# Patient Record
Sex: FEMALE | Race: WHITE | NOT HISPANIC OR LATINO | Employment: OTHER | RURAL
[De-identification: names, ages, dates, MRNs, and addresses within clinical notes are randomized per-mention and may not be internally consistent; named-entity substitution may affect disease eponyms.]

---

## 2022-06-02 ENCOUNTER — OFFICE VISIT (OUTPATIENT)
Dept: FAMILY MEDICINE CLINIC | Facility: CLINIC | Age: 77
End: 2022-06-02

## 2022-06-02 VITALS
DIASTOLIC BLOOD PRESSURE: 76 MMHG | HEART RATE: 62 BPM | OXYGEN SATURATION: 97 % | BODY MASS INDEX: 32.28 KG/M2 | SYSTOLIC BLOOD PRESSURE: 120 MMHG | HEIGHT: 61 IN | WEIGHT: 171 LBS

## 2022-06-02 DIAGNOSIS — R35.1 NOCTURIA: ICD-10-CM

## 2022-06-02 DIAGNOSIS — M05.79 RHEUMATOID ARTHRITIS INVOLVING MULTIPLE SITES WITH POSITIVE RHEUMATOID FACTOR: ICD-10-CM

## 2022-06-02 DIAGNOSIS — G60.9 IDIOPATHIC PERIPHERAL NEUROPATHY: ICD-10-CM

## 2022-06-02 DIAGNOSIS — K22.70 BARRETT'S ESOPHAGUS WITHOUT DYSPLASIA: ICD-10-CM

## 2022-06-02 DIAGNOSIS — I10 ESSENTIAL HYPERTENSION, BENIGN: ICD-10-CM

## 2022-06-02 DIAGNOSIS — M54.16 LUMBAR RADICULITIS: ICD-10-CM

## 2022-06-02 DIAGNOSIS — R73.03 PREDIABETES: Primary | ICD-10-CM

## 2022-06-02 DIAGNOSIS — M79.7 FIBROMYALGIA: ICD-10-CM

## 2022-06-02 DIAGNOSIS — K21.9 GASTROESOPHAGEAL REFLUX DISEASE WITHOUT ESOPHAGITIS: ICD-10-CM

## 2022-06-02 DIAGNOSIS — N39.41 URGE URINARY INCONTINENCE: ICD-10-CM

## 2022-06-02 DIAGNOSIS — M48.061 SPINAL STENOSIS OF LUMBAR REGION WITHOUT NEUROGENIC CLAUDICATION: ICD-10-CM

## 2022-06-02 DIAGNOSIS — R53.83 FATIGUE, UNSPECIFIED TYPE: ICD-10-CM

## 2022-06-02 DIAGNOSIS — K58.0 IRRITABLE BOWEL SYNDROME WITH DIARRHEA: ICD-10-CM

## 2022-06-02 DIAGNOSIS — F33.41 RECURRENT MAJOR DEPRESSIVE DISORDER, IN PARTIAL REMISSION: ICD-10-CM

## 2022-06-02 DIAGNOSIS — Z79.899 ENCOUNTER FOR LONG-TERM (CURRENT) USE OF OTHER MEDICATIONS: ICD-10-CM

## 2022-06-02 DIAGNOSIS — E78.2 MIXED HYPERLIPIDEMIA: ICD-10-CM

## 2022-06-02 PROCEDURE — 99214 OFFICE O/P EST MOD 30 MIN: CPT | Performed by: FAMILY MEDICINE

## 2022-06-02 PROCEDURE — 36415 COLL VENOUS BLD VENIPUNCTURE: CPT | Performed by: FAMILY MEDICINE

## 2022-06-02 RX ORDER — AMLODIPINE BESYLATE 5 MG/1
1 TABLET ORAL DAILY
COMMUNITY
Start: 2022-04-17 | End: 2022-12-11 | Stop reason: SDUPTHER

## 2022-06-02 RX ORDER — HYDROXYCHLOROQUINE SULFATE 200 MG/1
1 TABLET, FILM COATED ORAL 2 TIMES DAILY
COMMUNITY
Start: 2022-03-25

## 2022-06-02 RX ORDER — LEFLUNOMIDE 20 MG/1
1 TABLET ORAL DAILY
COMMUNITY
Start: 2022-03-25

## 2022-06-02 RX ORDER — TIZANIDINE 4 MG/1
1 TABLET ORAL
COMMUNITY
Start: 2022-04-17 | End: 2022-12-11 | Stop reason: SDUPTHER

## 2022-06-02 RX ORDER — METOPROLOL SUCCINATE 100 MG/1
1 TABLET, EXTENDED RELEASE ORAL DAILY
COMMUNITY
Start: 2022-05-08 | End: 2022-12-11 | Stop reason: SDUPTHER

## 2022-06-02 RX ORDER — DULOXETIN HYDROCHLORIDE 60 MG/1
1 CAPSULE, DELAYED RELEASE ORAL DAILY
COMMUNITY
Start: 2022-05-08 | End: 2022-06-02 | Stop reason: SDUPTHER

## 2022-06-02 RX ORDER — DULOXETIN HYDROCHLORIDE 60 MG/1
60 CAPSULE, DELAYED RELEASE ORAL DAILY
Qty: 90 CAPSULE | Refills: 3 | Status: SHIPPED | OUTPATIENT
Start: 2022-06-02 | End: 2022-12-11

## 2022-06-02 RX ORDER — TRAMADOL HYDROCHLORIDE 50 MG/1
50 TABLET ORAL EVERY 6 HOURS PRN
COMMUNITY
End: 2022-06-02 | Stop reason: SDUPTHER

## 2022-06-02 RX ORDER — ESTRADIOL 0.07 MG/D
1 PATCH TRANSDERMAL WEEKLY
COMMUNITY
Start: 2022-03-27 | End: 2022-12-11

## 2022-06-02 RX ORDER — DEXLANSOPRAZOLE 60 MG/1
1 CAPSULE, DELAYED RELEASE ORAL DAILY
COMMUNITY
Start: 2022-03-27 | End: 2022-12-11 | Stop reason: SDUPTHER

## 2022-06-02 RX ORDER — IBUPROFEN 800 MG/1
1 TABLET ORAL DAILY PRN
COMMUNITY
Start: 2022-03-23 | End: 2022-12-11

## 2022-06-02 RX ORDER — GABAPENTIN 100 MG/1
2 CAPSULE ORAL DAILY
COMMUNITY
Start: 2022-03-27

## 2022-06-02 RX ORDER — ADALIMUMAB 40MG/0.4ML
KIT SUBCUTANEOUS
COMMUNITY
Start: 2022-05-09

## 2022-06-02 RX ORDER — PRAVASTATIN SODIUM 20 MG
1 TABLET ORAL DAILY
COMMUNITY
Start: 2022-04-17 | End: 2022-12-11 | Stop reason: SDUPTHER

## 2022-06-02 RX ORDER — TRAMADOL HYDROCHLORIDE 50 MG/1
50 TABLET ORAL EVERY 6 HOURS PRN
Qty: 270 TABLET | Refills: 1 | Status: SHIPPED | OUTPATIENT
Start: 2022-06-02 | End: 2022-12-12 | Stop reason: SDUPTHER

## 2022-06-02 RX ORDER — VALSARTAN AND HYDROCHLOROTHIAZIDE 320; 25 MG/1; MG/1
1 TABLET, FILM COATED ORAL DAILY
COMMUNITY
Start: 2022-05-08 | End: 2022-12-11 | Stop reason: SDUPTHER

## 2022-06-02 NOTE — PROGRESS NOTES
"Chief Complaint  Roberto Carlos Pichardo presents to [facility]  History of Present Illness  Patient is here for 6-month checkup on hypertension and other medical issues.  Old records note that she has been diagnosed with type 2 diabetes, but detailed inspection of her records revealed that her glucose was only in the diabetes range when she was on prednisone for rheumatoid arthritis, and once the prednisone was stopped/she was weaned off, her glucose was in the prediabetes range.    The patient did tear her right rotator cuff during a fall a few months ago and has been doing physical therapy and is markedly improved.  She also has been having a lot of back pain, and went to [facility] orthopedics and got an epidural steroid injection, and will be having another 1 or some type of back injection at the end of this month.  She is having left leg radiculitis symptoms intermittently.  No saddle anesthesia or leg weakness.    She had been having issues with her right leg giving out on her, and had several falls.  That has improved with exercise.  Again, she is seen at [facility] orthopedics about her back issues.    Patient does have some intermittent issues with bowel and bladder function, mostly related to urinary urgency with urge incontinence and nocturia x3, occasionally having a small bowel movement when her bladder empties without a sensation of rectal emptying.  She discussed this in the context of her back issues, but since she has not had significant incontinence of bowel and bladder, it sounds more like a urogynecology type issue, and she wishes to be referred to a urologist for consultation.    Objective   Vital Signs:   Vitals:    06/02/22 1031   BP: 120/76   Pulse: 62   SpO2: 97%   Weight: 77.6 kg (171 lb)   Height: 154.9 cm (61\")      /76   Pulse 62   Ht 154.9 cm (61\")   Wt 77.6 kg (171 lb)   SpO2 97%   BMI 32.31 kg/m²     Body mass index is 32.31 " kg/m².    Review of Systems   Constitutional: Positive for fatigue. Negative for chills, fever and unexpected weight loss.   HENT: Negative for ear discharge, ear pain, mouth sores, nosebleeds, rhinorrhea, sinus pressure, sore throat, swollen glands, trouble swallowing and voice change.    Eyes: Negative for blurred vision, double vision, pain, redness and visual disturbance.   Respiratory: Negative for cough, shortness of breath and wheezing.    Cardiovascular: Negative for chest pain, palpitations and leg swelling.        PND, orthopnea   Gastrointestinal: Negative for abdominal distention, abdominal pain, anal bleeding, blood in stool, constipation, diarrhea, nausea, vomiting and GERD.        Dysphagia, odynophagia   Endocrine: Negative for polydipsia, polyphagia and polyuria.   Genitourinary: Positive for urgency and urinary incontinence. Negative for dysuria, flank pain, frequency, hematuria and vaginal discharge.   Musculoskeletal: Positive for back pain. Negative for arthralgias (unusual/atypica), gait problem, joint swelling, myalgias and neck pain.   Skin: Negative for rash, skin lesions (worrisome/suspicious), wound and bruise.   Allergic/Immunologic: Negative for food allergies.   Neurological: Negative for dizziness, tremors, seizures, syncope, facial asymmetry, speech difficulty, weakness, light-headedness, numbness, headache, memory problem and confusion.   Hematological: Negative for adenopathy. Does not bruise/bleed easily.   Psychiatric/Behavioral: Negative for suicidal ideas and depressed mood. The patient is not nervous/anxious.        Past History:  Medical History: has a past medical history of Allergic rhinitis, Carter esophagus (2006), Benign essential hypertension, Breast complaint (2008), Cataract, Class 1 obesity in adult, Diabetes mellitus, type 2 (HCC) (2013), Diabetic peripheral neuropathy associated with type 2 diabetes mellitus (HCC), Diverticulitis (2005), Diverticulosis of large  intestine without hemorrhage (2011), Edema, Fatigue, Fibromyalgia, Gastroesophageal reflux disease without esophagitis, Hammer toe, Herpes simplex, Hiatal hernia with gastroesophageal reflux, High risk medication use, Irritable bowel syndrome, Irritable bowel syndrome with constipation, Left retinal detachment (2010), Microscopic hematuria (1995), Mixed hyperlipidemia, Lizama neuroma, left (2010), Lizama's neuroma of both feet (2007), Lizama's neuroma of left foot (2008), Onychomycosis of left great toe, Plantar fasciitis, 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), Rheumatoid arthritis involving multiple joints (HCC), Tear of meniscus of left knee (2008), and Vitamin D deficiency.   Surgical History: has a past surgical history that includes Lizama's Neuroma Excision (Left, 2008); Cystoscopy; Knee arthroscopy; Tonsillectomy and adenoidectomy; Dilation and curettage of uterus; Bunionectomy (Bilateral); Colonoscopy (2011); and Esophagogastroduodenoscopy (2008).   Family History: family history includes Alzheimer's disease in her mother; Heart failure in her mother; Hyperlipidemia in her mother; Hypertension in her mother.   Social History: reports that she quit smoking about 32 years ago. Her smoking use included cigarettes. She started smoking about 52 years ago. She has a 20.00 pack-year smoking history. She has never used smokeless tobacco. She reports previous alcohol use. She reports that she does not use drugs.      Current Outpatient Medications:   •  amLODIPine (NORVASC) 5 MG tablet, Take 1 tablet by mouth Daily., Disp: , Rfl:   •  dexlansoprazole (DEXILANT) 60 MG capsule, Take 1 capsule by mouth Daily., Disp: , Rfl:   •  DULoxetine (CYMBALTA) 60 MG capsule, Take 1 capsule by mouth Daily., Disp: 90 capsule, Rfl: 3  •  estradiol (CLIMARA) 0.075 MG/24HR patch, Place 1 patch on the skin as directed by provider 1 (One) Time Per Week., Disp: , Rfl:   •  gabapentin (NEURONTIN) 100 MG capsule, Take 2  capsules by mouth Daily., Disp: , Rfl:   •  Humira Pen 40 MG/0.4ML Pen-injector Kit, , Disp: , Rfl:   •  hydroxychloroquine (PLAQUENIL) 200 MG tablet, Take 1 tablet by mouth 2 (Two) Times a Day., Disp: , Rfl:   •  ibuprofen (ADVIL,MOTRIN) 800 MG tablet, Take 1 tablet by mouth Daily As Needed., Disp: , Rfl:   •  leflunomide (ARAVA) 20 MG tablet, Take 1 tablet by mouth Daily., Disp: , Rfl:   •  metoprolol succinate XL (TOPROL-XL) 100 MG 24 hr tablet, Take 1 tablet by mouth Daily., Disp: , Rfl:   •  pravastatin (PRAVACHOL) 20 MG tablet, Take 1 tablet by mouth Daily., Disp: , Rfl:   •  tiZANidine (ZANAFLEX) 4 MG tablet, Take 1 tablet by mouth every night at bedtime., Disp: , Rfl:   •  traMADol (ULTRAM) 50 MG tablet, Take 1 tablet by mouth Every 6 (Six) Hours As Needed for Moderate Pain ., Disp: 270 tablet, Rfl: 1  •  valsartan-hydrochlorothiazide (DIOVAN-HCT) 320-25 MG per tablet, Take 1 tablet by mouth Daily., Disp: , Rfl:     Allergies: Erythromycin    Physical Exam  Constitutional:       General: She is not in acute distress.     Appearance: She is obese. She is not toxic-appearing.   HENT:      Head: Normocephalic and atraumatic.      Right Ear: Tympanic membrane, ear canal and external ear normal.      Left Ear: Tympanic membrane, ear canal and external ear normal.      Nose: Nose normal. No rhinorrhea.      Mouth/Throat:      Mouth: Mucous membranes are moist.      Pharynx: Oropharynx is clear. No posterior oropharyngeal erythema.   Eyes:      General: No scleral icterus.     Extraocular Movements: Extraocular movements intact.      Conjunctiva/sclera: Conjunctivae normal.      Pupils: Pupils are equal, round, and reactive to light.   Neck:      Vascular: No carotid bruit.   Cardiovascular:      Rate and Rhythm: Normal rate and regular rhythm.      Pulses: Normal pulses.      Heart sounds: Normal heart sounds. No murmur heard.    No gallop.   Pulmonary:      Effort: Pulmonary effort is normal.      Breath sounds:  Normal breath sounds. No wheezing, rhonchi or rales.   Chest:      Chest wall: No tenderness.   Abdominal:      General: Bowel sounds are normal. There is no distension.      Palpations: Abdomen is soft. There is no mass.      Tenderness: There is no abdominal tenderness. There is no guarding or rebound.   Musculoskeletal:         General: No swelling, tenderness or deformity. Normal range of motion.      Cervical back: No rigidity.      Right lower leg: No edema.      Left lower leg: No edema.   Lymphadenopathy:      Cervical: No cervical adenopathy.   Skin:     General: Skin is warm and dry.      Capillary Refill: Capillary refill takes less than 2 seconds.      Coloration: Skin is not jaundiced or pale.      Findings: No bruising, erythema or rash.   Neurological:      General: No focal deficit present.      Mental Status: She is alert and oriented to person, place, and time.      Cranial Nerves: No cranial nerve deficit.      Motor: No weakness.      Coordination: Coordination normal.      Gait: Gait normal.   Psychiatric:         Mood and Affect: Mood normal.         Behavior: Behavior normal.         Thought Content: Thought content normal.         Judgment: Judgment normal.          Result Review :                  Assessment and Plan   Diagnoses and all orders for this visit:    1. Prediabetes (Primary)  -     Hemoglobin A1c; Future  -     Hemoglobin A1c  We will check labs today and refill her current medications.  The patient still uses tramadol as needed and has been very reliable with this.  Moe report is fine and she is not due for urine drug screen today.  She also still takes gabapentin for neuropathy.  She is followed by rheumatology for her rheumatoid arthritis and fibromyalgia as well.  She will keep her follow-up appointment with orthopedics about her back issues, and we will refer to urology for her urinary urge incontinence and nocturia.  She says her depression is currently doing well and  she needs a refill on her Cymbalta.  If all goes well I will see her back in 6 months or sooner if needed  2. Essential hypertension, benign    3. Mixed hyperlipidemia  -     Lipid Panel; Future  -     Lipid Panel    4. Encounter for long-term (current) use of other medications  -     CBC Auto Differential; Future  -     Comprehensive Metabolic Panel; Future  -     CBC Auto Differential  -     Comprehensive Metabolic Panel    5. Recurrent major depressive disorder, in partial remission (HCC)    6. Fibromyalgia    7. Rheumatoid arthritis involving multiple sites with positive rheumatoid factor (HCC)  -     traMADol (ULTRAM) 50 MG tablet; Take 1 tablet by mouth Every 6 (Six) Hours As Needed for Moderate Pain .  Dispense: 270 tablet; Refill: 1    8. Gastroesophageal reflux disease without esophagitis    9. Irritable bowel syndrome with diarrhea    10. Carter's esophagus without dysplasia    11. Idiopathic peripheral neuropathy  -     Vitamin B12; Future  -     Folate; Future  -     Vitamin B12  -     Folate    12. Fatigue, unspecified type  -     TSH; Future  -     TSH    13. Urge urinary incontinence  -     Ambulatory Referral to Urology    14. Nocturia    15. Lumbar radiculitis    16. Spinal stenosis of lumbar region without neurogenic claudication    Other orders  -     DULoxetine (CYMBALTA) 60 MG capsule; Take 1 capsule by mouth Daily.  Dispense: 90 capsule; Refill: 3                 Follow Up   Return in about 6 months (around 12/2/2022) for Recheck, Nurse - AWV Subsequent.  Patient was given instructions and counseling regarding her condition or for health maintenance advice. Please see specific information pulled into the AVS if appropriate.     Donny Falcon MD

## 2022-06-03 LAB
ALBUMIN SERPL-MCNC: 4.3 G/DL (ref 3.7–4.7)
ALBUMIN/GLOB SERPL: 2.2 {RATIO} (ref 1.2–2.2)
ALP SERPL-CCNC: 80 IU/L (ref 44–121)
ALT SERPL-CCNC: 26 IU/L (ref 0–32)
AST SERPL-CCNC: 31 IU/L (ref 0–40)
BASOPHILS # BLD AUTO: 0 X10E3/UL (ref 0–0.2)
BASOPHILS NFR BLD AUTO: 1 %
BILIRUB SERPL-MCNC: 0.5 MG/DL (ref 0–1.2)
BUN SERPL-MCNC: 24 MG/DL (ref 8–27)
BUN/CREAT SERPL: 33 (ref 12–28)
CALCIUM SERPL-MCNC: 10.3 MG/DL (ref 8.7–10.3)
CHLORIDE SERPL-SCNC: 102 MMOL/L (ref 96–106)
CHOLEST SERPL-MCNC: 188 MG/DL (ref 100–199)
CO2 SERPL-SCNC: 25 MMOL/L (ref 20–29)
CREAT SERPL-MCNC: 0.72 MG/DL (ref 0.57–1)
EGFRCR SERPLBLD CKD-EPI 2021: 88 ML/MIN/1.73
EOSINOPHIL # BLD AUTO: 0.1 X10E3/UL (ref 0–0.4)
EOSINOPHIL NFR BLD AUTO: 2 %
ERYTHROCYTE [DISTWIDTH] IN BLOOD BY AUTOMATED COUNT: 12.8 % (ref 11.7–15.4)
FOLATE SERPL-MCNC: >20 NG/ML
GLOBULIN SER CALC-MCNC: 2 G/DL (ref 1.5–4.5)
GLUCOSE SERPL-MCNC: 94 MG/DL (ref 65–99)
HBA1C MFR BLD: 5.4 % (ref 4.8–5.6)
HCT VFR BLD AUTO: 41.1 % (ref 34–46.6)
HDLC SERPL-MCNC: 49 MG/DL
HGB BLD-MCNC: 14 G/DL (ref 11.1–15.9)
IMM GRANULOCYTES # BLD AUTO: 0 X10E3/UL (ref 0–0.1)
IMM GRANULOCYTES NFR BLD AUTO: 0 %
LDLC SERPL CALC-MCNC: 101 MG/DL (ref 0–99)
LYMPHOCYTES # BLD AUTO: 2.1 X10E3/UL (ref 0.7–3.1)
LYMPHOCYTES NFR BLD AUTO: 33 %
MCH RBC QN AUTO: 31 PG (ref 26.6–33)
MCHC RBC AUTO-ENTMCNC: 34.1 G/DL (ref 31.5–35.7)
MCV RBC AUTO: 91 FL (ref 79–97)
MONOCYTES # BLD AUTO: 1.1 X10E3/UL (ref 0.1–0.9)
MONOCYTES NFR BLD AUTO: 16 %
NEUTROPHILS # BLD AUTO: 3.2 X10E3/UL (ref 1.4–7)
NEUTROPHILS NFR BLD AUTO: 48 %
PLATELET # BLD AUTO: 254 X10E3/UL (ref 150–450)
POTASSIUM SERPL-SCNC: 4.5 MMOL/L (ref 3.5–5.2)
PROT SERPL-MCNC: 6.3 G/DL (ref 6–8.5)
RBC # BLD AUTO: 4.51 X10E6/UL (ref 3.77–5.28)
SODIUM SERPL-SCNC: 142 MMOL/L (ref 134–144)
TRIGL SERPL-MCNC: 221 MG/DL (ref 0–149)
TSH SERPL DL<=0.005 MIU/L-ACNC: 2.07 UIU/ML (ref 0.45–4.5)
VIT B12 SERPL-MCNC: 976 PG/ML (ref 232–1245)
VLDLC SERPL CALC-MCNC: 38 MG/DL (ref 5–40)
WBC # BLD AUTO: 6.6 X10E3/UL (ref 3.4–10.8)

## 2022-07-18 ENCOUNTER — OFFICE VISIT (OUTPATIENT)
Dept: UROLOGY | Facility: CLINIC | Age: 77
End: 2022-07-18

## 2022-07-18 VITALS — BODY MASS INDEX: 32.28 KG/M2 | HEIGHT: 61 IN | WEIGHT: 171 LBS

## 2022-07-18 DIAGNOSIS — R39.9 LOWER URINARY TRACT SYMPTOMS (LUTS): Primary | ICD-10-CM

## 2022-07-18 DIAGNOSIS — N39.41 URGE URINARY INCONTINENCE: ICD-10-CM

## 2022-07-18 PROCEDURE — 51798 US URINE CAPACITY MEASURE: CPT | Performed by: UROLOGY

## 2022-07-18 PROCEDURE — 99204 OFFICE O/P NEW MOD 45 MIN: CPT | Performed by: UROLOGY

## 2022-07-18 RX ORDER — DULOXETIN HYDROCHLORIDE 60 MG/1
1 CAPSULE, DELAYED RELEASE ORAL DAILY
COMMUNITY
Start: 2022-02-27 | End: 2022-12-11 | Stop reason: SDUPTHER

## 2022-07-18 RX ORDER — CONJUGATED ESTROGENS 0.62 MG/G
CREAM VAGINAL
COMMUNITY
Start: 2022-06-04 | End: 2022-12-11

## 2022-07-18 NOTE — PROGRESS NOTES
"       Office Visit New Urology      Patient Name: Mady Pichardo  : 1945   MRN: [id number]     Chief Complaint:  No chief complaint on file.      Referring Provider: Donny Falcon MD    History of Present Illness: Mady Pichardo is a 76 y.o. female who presents to Urology today for ***      Subjective      Review of System: Review of Systems   Constitutional: Negative for chills, fatigue, fever and unexpected weight change.   HENT: Negative for sore throat.    Eyes: Negative for visual disturbance.   Respiratory: Negative for cough, chest tightness and shortness of breath.    Cardiovascular: Negative for chest pain and leg swelling.   Gastrointestinal: Negative for blood in stool, constipation, diarrhea, nausea, rectal pain and vomiting.   Genitourinary: Positive for frequency and urgency. Negative for decreased urine volume, difficulty urinating, dysuria, enuresis, flank pain, genital sores and hematuria.   Musculoskeletal: Negative for back pain and joint swelling.   Skin: Negative for rash and wound.   Neurological: Negative for seizures, speech difficulty, weakness and headaches.   Psychiatric/Behavioral: Negative for confusion, sleep disturbance and suicidal ideas. The patient is not nervous/anxious.       I have reviewed the ROS documented by my clinical staff, updated appropriately and I agree. Karla Leiva MA    Past Medical History:   Past Medical History:   Diagnosis Date   • Allergic rhinitis    • Carter esophagus 2006    EGD WITH BIOPSY      ,, , 2011   • Benign essential hypertension    • Breast complaint     \"DIMPLE\" DX MMG AND ULTRASOUND, ALL NORMAL   • Cataract     NOT IN NEED OF SURGERY YET   • Class 1 obesity in adult    • Diabetes mellitus, type 2 (HCC)    • Diabetic peripheral neuropathy associated with type 2 diabetes mellitus (HCC)    • Diverticulitis     IV ATBX - IMPROVED   • Diverticulosis of large intestine without hemorrhage    • Edema     WITH " VENOUS INSUFFICIENCY   • Fatigue    • Fibromyalgia    • Gastroesophageal reflux disease without esophagitis    • Hammer toe    • Herpes simplex     GENITAL  - CONTROLLED W/ CHRONIC FAMVIR   • Hiatal hernia with gastroesophageal reflux    • High risk medication use    • Irritable bowel syndrome    • Irritable bowel syndrome with constipation    • Left retinal detachment     LASER TREATMENT, SUCCESSFUL   • Microscopic hematuria     IVP/CYSTOSCOPY   • Mixed hyperlipidemia    • Lizama neuroma, left     2ND   • Liazma's neuroma of both feet     INJECTION, FAILED, SURGERY PLANNED   • Lizama's neuroma of left foot     NEUROMA SURGICALLY REMOVED, LEFT FOOT   • Onychomycosis of left great toe    • Plantar fasciitis    • 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)    • Rheumatoid arthritis involving multiple joints (HCC)    • Tear of meniscus of left knee    • Vitamin D deficiency        Past Surgical History:   Past Surgical History:   Procedure Laterality Date   • BUNIONECTOMY Bilateral    • COLONOSCOPY     • CYSTOSCOPY     • DILATATION AND CURETTAGE     • ENDOSCOPY      WITH BIOPSY ( ,, , 2011)   • KNEE ARTHROSCOPY     • LIZAMA'S NEUROMA EXCISION Left     ALSO 2010   • TONSILLECTOMY AND ADENOIDECTOMY         Family History:   Family History   Problem Relation Age of Onset   • Hyperlipidemia Mother    • Hypertension Mother    • Heart failure Mother    • Alzheimer's disease Mother        Social History:   Social History     Socioeconomic History   • Marital status:    Tobacco Use   • Smoking status: Former Smoker     Packs/day: 1.00     Years: 20.00     Pack years: 20.00     Types: Cigarettes     Start date:      Quit date:      Years since quittin.5   • Smokeless tobacco: Never Used   Substance and Sexual Activity   • Alcohol use: Not Currently   • Drug use: Never   • Sexual activity: Defer       Medications:     Current Outpatient  Medications:   •  amLODIPine (NORVASC) 5 MG tablet, Take 1 tablet by mouth Daily., Disp: , Rfl:   •  dexlansoprazole (DEXILANT) 60 MG capsule, Take 1 capsule by mouth Daily., Disp: , Rfl:   •  DULoxetine (CYMBALTA) 60 MG capsule, Take 1 capsule by mouth Daily., Disp: 90 capsule, Rfl: 3  •  estradiol (CLIMARA) 0.075 MG/24HR patch, Place 1 patch on the skin as directed by provider 1 (One) Time Per Week., Disp: , Rfl:   •  gabapentin (NEURONTIN) 100 MG capsule, Take 2 capsules by mouth Daily., Disp: , Rfl:   •  Humira Pen 40 MG/0.4ML Pen-injector Kit, , Disp: , Rfl:   •  hydroxychloroquine (PLAQUENIL) 200 MG tablet, Take 1 tablet by mouth 2 (Two) Times a Day., Disp: , Rfl:   •  ibuprofen (ADVIL,MOTRIN) 800 MG tablet, Take 1 tablet by mouth Daily As Needed., Disp: , Rfl:   •  leflunomide (ARAVA) 20 MG tablet, Take 1 tablet by mouth Daily., Disp: , Rfl:   •  metoprolol succinate XL (TOPROL-XL) 100 MG 24 hr tablet, Take 1 tablet by mouth Daily., Disp: , Rfl:   •  pravastatin (PRAVACHOL) 20 MG tablet, Take 1 tablet by mouth Daily., Disp: , Rfl:   •  tiZANidine (ZANAFLEX) 4 MG tablet, Take 1 tablet by mouth every night at bedtime., Disp: , Rfl:   •  traMADol (ULTRAM) 50 MG tablet, Take 1 tablet by mouth Every 6 (Six) Hours As Needed for Moderate Pain ., Disp: 270 tablet, Rfl: 1  •  valsartan-hydrochlorothiazide (DIOVAN-HCT) 320-25 MG per tablet, Take 1 tablet by mouth Daily., Disp: , Rfl:     Allergies:   Allergies   Allergen Reactions   • Erythromycin GI Intolerance       IPSS Questionnaire (AUA-7):  Over the past month…    1)  Incomplete Emptying  How often have you had a sensation of not emptying your bladder?  {Ratin}   2)  Frequency  How often have you had to urinate less than every two hours? {Ratin}   3)  Intermittency  How often have you found you stopped and started again several times when you urinated?  {Ratin}   4) Urgency  How often have you found it difficult to postpone urination?   "{Ratin}   5) Weak Stream  How often have you had a weak urinary stream?  {Ratin}   6) Straining  How often have you had to push or strain to begin urination?  {Ratin}   7) Nocturia  How many times did you typically get up at night to urinate?  {Ratin}   Total Score:  {0-35:36698}       Quality of life due to urinary symptoms:  If you were to spend the rest of your life with your urinary condition the way it is now, how would you feel about that? {Ratin}   Urine Leakage (Incontinence) {Ratin}     Sexual Health Inventory for Men (TABATHA)   Over the past 6 months:     1. How do you rate your confidence that you could get and keep an erection?  {TABATHA Score 1:97697}   2. When you had erections with sexual                                     stimulation, how often were your erections hard enough for penetration (entering your partner)?  {TABATHA Scores 2:62378}   3. During sexual intercourse, how often were you able to maintain your erection after you had penetrated (entered) your partner?  {TABATHA Scores 3:43026}   4. During sexual intercourse, how difficult was it to maintain your erection to completion of intercourse?  {TABATHA Scores 4:87148}   5. When you attempted sexual intercourse, how often was it satisfactory for you?  {TABATHA Scores 5:79086}    Total Score: {TABATHA Total:69127}   The Sexual Health Inventory for Men further classifies ED severity with the following breakpoints:   1-7 (Severe ED) 8-11 (Moderate ED) 12-16 (Mild to Moderate ED) 17-21 (Mild ED)      Post void residual bladder scan:   126mL       Objective     Physical Exam:   Vital Signs: There were no vitals filed for this visit.  There is no height or weight on file to calculate BMI.     Physical Exam    Genitourinary  Penis: {Desc; circumcised/uncircumcised:5705::\"circumcised\"} penis, glans normal, no penile discharge.  No rashes/lesions.    Testes: descended bilaterally, no masses, {Desc; tender/non:83843} to " "palpation. Remainder of scrotal contents normal. No hernia appreciated.  Rectal:  Normal tone, no masses.  Prostate:  {NUMBERS; 5-50 BY 5:37963} grams.  Symmetric, non-tender, anodular and no induration.      Labs:   Brief Urine Lab Results     None               Lab Results   Component Value Date    GLUCOSE 94 06/02/2022    CALCIUM 10.3 06/02/2022     06/02/2022    K 4.5 06/02/2022    CO2 25 06/02/2022     06/02/2022    BUN 24 06/02/2022    CREATININE 0.72 06/02/2022    BCR 33 (H) 06/02/2022       Lab Results   Component Value Date    WBC 6.6 06/02/2022    HGB 14.0 06/02/2022    HCT 41.1 06/02/2022    MCV 91 06/02/2022     06/02/2022       Images:   No Images in the past 120 days found..    Measures:   Tobacco:   Mady Pichardo  reports that she quit smoking about 32 years ago. Her smoking use included cigarettes. She started smoking about 52 years ago. She has a 20.00 pack-year smoking history. She has never used smokeless tobacco.. I have educated her on the risk of diseases from using tobacco products such as {Tobacco Cessation Diseases:36793::\"cancer\",\"COPD\",\"heart disease\"}.     I advised her to quit and she is {Willing/Not Willing to Quit Tobacco Products:72735}.    I spent {Time Spent Tobacco :01516} minutes counseling the patient.           Urine Incontinence: ( NOUI)  ***   Assessment / Plan      Assessment/Plan:   76 y.o. female is here today for ***    There are no diagnoses linked to this encounter.       Follow Up:   No follow-ups on file.    I spent approximately *** minutes providing clinical care for this patient; including review of patient's chart and provider documentation, face to face time spent with patient in examination room (obtaining history, performing physical exam, discussing diagnosis and management options), placing orders, and completing patient documentation.     Jacob Sterling MD  [facility] [place]   "

## 2022-07-20 PROBLEM — R39.9 LOWER URINARY TRACT SYMPTOMS (LUTS): Status: ACTIVE | Noted: 2022-07-20

## 2022-07-20 NOTE — PROGRESS NOTES
LUTS Female Office Visit      Patient Name: Mady Pichardo  : 1945   MRN: [id number]     Chief Complaint:  Lower Urinary Tract Symptoms.     Referring Provider: Donny Falcon MD    History of Present Illness: Mrs. Pichardo is a 76 y.o. female with history of lower urinary tract symptoms.  Past medical history includes hypertension, hyperlipidemia, diabetes, GERD, irritable bowel syndrome, depression, fibromyalgia, rheumatoid arthritis, diverticulosis.. She presents today for evaluation of lower urinary tract symptoms.  She reports mixed urinary incontinence with urge predominant.  She denies obstructive based urinary symptoms including intermittency, hesitancy or weakening of stream.  She denies hematuria.  She denies history of urinary tract infection.  She reports a daily urinary frequency, urgency, urge associated episodes.  She reports nocturia and nighttime incontinence.  She reports significant bother associated with symptoms, she is requiring daily pads for incontinence episodes.  She denies past urologic evaluation including instrumentation or procedure..       Subjective      Review of System: Review of Systems   Constitutional: Negative for chills, fatigue, fever and unexpected weight change.   HENT: Negative for sore throat.    Eyes: Negative for visual disturbance.   Respiratory: Negative for cough, chest tightness and shortness of breath.    Cardiovascular: Negative for chest pain and leg swelling.   Gastrointestinal: Negative for blood in stool, constipation, diarrhea, nausea, rectal pain and vomiting.   Genitourinary: Negative for decreased urine volume, difficulty urinating, dysuria, enuresis, flank pain, frequency, genital sores, hematuria and urgency.   Musculoskeletal: Negative for back pain and joint swelling.   Skin: Negative for rash and wound.   Neurological: Negative for seizures, speech difficulty, weakness and headaches.   Psychiatric/Behavioral: Negative for confusion, sleep  "disturbance and suicidal ideas. The patient is not nervous/anxious.       I have reviewed the ROS documented by my clinical staff, updated appropriate and I agree. Jacob Sterling MD    Past Medical History:  Past Medical History:   Diagnosis Date   • Allergic rhinitis    • Carter esophagus 2006    EGD WITH BIOPSY      2008,2006, 4-2009, 11/2011   • Benign essential hypertension    • Breast complaint 2008    \"DIMPLE\" DX MMG AND ULTRASOUND, ALL NORMAL   • Cataract     NOT IN NEED OF SURGERY YET   • Class 1 obesity in adult    • Diabetes mellitus, type 2 ([facility]) 2013   • Diabetic peripheral neuropathy associated with type 2 diabetes mellitus ([facility])    • Diverticulitis 2005    IV ATBX - IMPROVED   • Diverticulosis of large intestine without hemorrhage 2011   • Edema     WITH VENOUS INSUFFICIENCY   • Fatigue    • Fibromyalgia    • Gastroesophageal reflux disease without esophagitis    • Hammer toe    • Herpes simplex     GENITAL  - CONTROLLED W/ CHRONIC FAMVIR   • Hiatal hernia with gastroesophageal reflux    • High risk medication use    • Irritable bowel syndrome    • Irritable bowel syndrome with constipation    • Left retinal detachment 2010    LASER TREATMENT, SUCCESSFUL   • Microscopic hematuria 1995    IVP/CYSTOSCOPY   • Mixed hyperlipidemia    • Lizama neuroma, 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   • Lizama's neuroma of both feet 2007    INJECTION, FAILED, SURGERY PLANNED   • Lizama's neuroma of left foot 2008    NEUROMA SURGICALLY REMOVED, LEFT FOOT   • Onychomycosis of left great toe    • Plantar fasciitis    • Recurrent major depression in partial remission ([facility])    • Rheumatoid arthritis involving multiple joints ([facility])    • Tear of meniscus of left knee 2008   • Vitamin D deficiency        Past Surgical History:  Past Surgical History:   Procedure Laterality Date   • BUNIONECTOMY Bilateral    • COLONOSCOPY  2011   • CYSTOSCOPY     • DILATATION AND CURETTAGE     • ENDOSCOPY  2008    WITH BIOPSY ( 2008,2006, 4-2009, 11/2011)   • " KNEE ARTHROSCOPY     • CAZARES'S NEUROMA EXCISION Left 2008    ALSO 2010   • TONSILLECTOMY AND ADENOIDECTOMY         Medications:    Current Outpatient Medications:   •  amLODIPine (NORVASC) 5 MG tablet, Take 1 tablet by mouth Daily., Disp: , Rfl:   •  dexlansoprazole (DEXILANT) 60 MG capsule, Take 1 capsule by mouth Daily., Disp: , Rfl:   •  DULoxetine (CYMBALTA) 60 MG capsule, Take 1 capsule by mouth Daily., Disp: 90 capsule, Rfl: 3  •  DULoxetine (CYMBALTA) 60 MG capsule, Take 1 capsule by mouth Daily., Disp: , Rfl:   •  estradiol (CLIMARA) 0.075 MG/24HR patch, Place 1 patch on the skin as directed by provider 1 (One) Time Per Week., Disp: , Rfl:   •  gabapentin (NEURONTIN) 100 MG capsule, Take 2 capsules by mouth Daily., Disp: , Rfl:   •  Humira Pen 40 MG/0.4ML Pen-injector Kit, , Disp: , Rfl:   •  hydroxychloroquine (PLAQUENIL) 200 MG tablet, Take 1 tablet by mouth 2 (Two) Times a Day., Disp: , Rfl:   •  ibuprofen (ADVIL,MOTRIN) 800 MG tablet, Take 1 tablet by mouth Daily As Needed., Disp: , Rfl:   •  leflunomide (ARAVA) 20 MG tablet, Take 1 tablet by mouth Daily., Disp: , Rfl:   •  metoprolol succinate XL (TOPROL-XL) 100 MG 24 hr tablet, Take 1 tablet by mouth Daily., Disp: , Rfl:   •  pravastatin (PRAVACHOL) 20 MG tablet, Take 1 tablet by mouth Daily., Disp: , Rfl:   •  Premarin 0.625 MG/GM vaginal cream, , Disp: , Rfl:   •  tiZANidine (ZANAFLEX) 4 MG tablet, Take 1 tablet by mouth every night at bedtime., Disp: , Rfl:   •  traMADol (ULTRAM) 50 MG tablet, Take 1 tablet by mouth Every 6 (Six) Hours As Needed for Moderate Pain ., Disp: 270 tablet, Rfl: 1  •  valsartan-hydrochlorothiazide (DIOVAN-HCT) 320-25 MG per tablet, Take 1 tablet by mouth Daily., Disp: , Rfl:     Allergies:  Allergies   Allergen Reactions   • Erythromycin GI Intolerance   • Meloxicam GI Bleeding   • Metronidazole GI Intolerance       Social History:  Social History     Socioeconomic History   • Marital status:    Tobacco Use   •  "Smoking status: Former Smoker     Packs/day: 1.00     Years: 20.00     Pack years: 20.00     Types: Cigarettes     Start date:      Quit date:      Years since quittin.5   • Smokeless tobacco: Never Used   Substance and Sexual Activity   • Alcohol use: Not Currently   • Drug use: Never   • Sexual activity: Defer       Family History:  Family History   Problem Relation Age of Onset   • Hyperlipidemia Mother    • Hypertension Mother    • Heart failure Mother    • Alzheimer's disease Mother          Objective     Physical Exam:   Vital Signs:   Vitals:    22 1345   Weight: 77.6 kg (171 lb)   Height: 154.9 cm (60.98\")   PainSc: 0-No pain     Body mass index is 32.33 kg/m².     Physical Exam  Vitals and nursing note reviewed.   Constitutional:       Appearance: Normal appearance.   HENT:      Head: Normocephalic and atraumatic.   Cardiovascular:      Comments: Well perfused  Pulmonary:      Effort: Pulmonary effort is normal.   Abdominal:      General: Abdomen is flat.      Palpations: Abdomen is soft.   Musculoskeletal:         General: Normal range of motion.   Skin:     General: Skin is warm and dry.   Neurological:      General: No focal deficit present.      Mental Status: She is alert and oriented to person, place, and time. Mental status is at baseline.   Psychiatric:         Mood and Affect: Mood normal.         Behavior: Behavior normal.         Thought Content: Thought content normal.         Judgment: Judgment normal.         Labs:   Brief Urine Lab Results     None               Lab Results   Component Value Date    GLUCOSE 94 2022    CALCIUM 10.3 2022     2022    K 4.5 2022    CO2 25 2022     2022    BUN 24 2022    CREATININE 0.72 2022    BCR 33 (H) 2022       Lab Results   Component Value Date    WBC 6.6 2022    HGB 14.0 2022    HCT 41.1 2022    MCV 91 2022     2022       Images:   No " Images in the past 120 days found..    Measures:   Tobacco:   Mady Pichardo  reports that she quit smoking about 32 years ago. Her smoking use included cigarettes. She started smoking about 52 years ago. She has a 20.00 pack-year smoking history. She has never used smokeless tobacco.. I have educated her on the risk of diseases from using tobacco products.       Urine Incontinence: ( NOUI)  Patient seen and evaluated for mixed urinary incontinence, urge predominant urinary incontinence    Assessment / Plan      Assessment:  Mrs. Pichardo is a 76 y.o. female who presents today with lower urinary tract symptoms including urinary frequency, urgency and urge associated incontinence.  She reports significant bother associated with urinary urgency.  She is wearing daily pad.  She denies obstructive based symptoms.  She denies hematuria or history of urinary tract infection.  She denies past urologic evaluation including instrumentation or procedure.    Diagnoses and all orders for this visit:    1. Lower urinary tract symptoms (LUTS) (Primary)    2. Urge urinary incontinence        Patient Education:     Today we discussed in depth the etiology and management of pelvic floor dysfunction.  We discussed the pelvic floor dysfunction is a common condition in which the patient is unable to relax or coordinate the muscles of the pelvic floor with urination, bowel movement or intercourse.  We discussed that this is a common condition seen both men and women.  We discussed the role of the pelvic musculature and that it supports the bladder, vagina, rectum.  We discussed the intricate and dynamic interplay between the pelvic floor and these associated organs.  Discussed that the pelvic floor musculature added support and stability for these organ systems but can also result in symptoms including muscle spasms, pain with urination, bowel movement or sexual intercourse.  We discussed that the causes of pelvic floor dysfunction are  often multifactorial. .  We discussed that it can often take a prolonged period of multimodal therapy to improve symptoms.  We discussed conservative treatment strategies including biofeedback, pelvic floor physical therapy, medications, relaxing techniques.  We discussed that improving bowel habits and constipation is important.        Today we discussed the etiology and management of LUTS/OAB. We discussed work-up including history and physical exam as well as urinalysis.  We discussed PVR.  We discussed evaluation and management of urinary urgency and overactivity of the bladder.  We discussed conservative management and behavioral strategies including decreasing irritative p.o. fluid intake, timed voiding, double voiding.  We discussed the role that constipation can play in lower urinary tract symptoms and improving bowel hygiene.  We discussed management of overactive bladder including conservative p.o. medication options.  We discussed anticholinergic medication class and the risks and benefits of this medication including side effects including dry mouth, dry, constipation and others.  We discussed that there are additional medications including beta-3 agonist, and associated risks and benefits including hypertension and requirement to monitor blood pressure after starting.  We also discussed  third line potential procedural interventions including intradetrusor botox injections and sacral nerve neuromodulation. We discussed risks and benefits of these procedures.  I discussed the role of a bladder diary and how this will help identify her most concerning urinary symptoms.      Follow-up 4 weeks with bladder diary, continue to evaluation.       Follow Up:   No follow-ups on file.    I spent approximately 45 minutes providing clinical care for this patient; including review of patient's chart and provider documentation, face to face time spent with patient in examination room (obtaining history, performing  physical exam, discussing diagnosis and management options), placing orders, and completing patient documentation.     Jacob Sterling MD  [facility] [place]

## 2022-08-11 ENCOUNTER — PROCEDURE VISIT (OUTPATIENT)
Dept: UROLOGY | Facility: CLINIC | Age: 77
End: 2022-08-11

## 2022-08-11 VITALS — HEIGHT: 61 IN | WEIGHT: 171 LBS | BODY MASS INDEX: 32.28 KG/M2

## 2022-08-11 DIAGNOSIS — N32.81 OAB (OVERACTIVE BLADDER): ICD-10-CM

## 2022-08-11 DIAGNOSIS — N39.41 URGE URINARY INCONTINENCE: ICD-10-CM

## 2022-08-11 DIAGNOSIS — R39.9 LOWER URINARY TRACT SYMPTOMS (LUTS): Primary | ICD-10-CM

## 2022-08-11 PROCEDURE — 52000 CYSTOURETHROSCOPY: CPT | Performed by: UROLOGY

## 2022-08-11 PROCEDURE — 99214 OFFICE O/P EST MOD 30 MIN: CPT | Performed by: UROLOGY

## 2022-08-11 NOTE — PROGRESS NOTES
"     LUTS Female Office Visit      Patient Name: Mady Pichardo  : 1945   MRN: [id number]     Chief Complaint:  Lower Urinary Tract Symptoms.     Referring Provider: No ref. provider found    History of Present Illness: Mrs. Pichardo is a 77 y.o. female with history of lower urinary tract symptoms.  Past medical history includes ***. She presents today for evaluation of ***.       Subjective      Review of System: Review of Systems   Constitutional: Negative for chills, fatigue, fever and unexpected weight change.   HENT: Negative for sore throat.    Eyes: Negative for visual disturbance.   Respiratory: Negative for cough, chest tightness and shortness of breath.    Cardiovascular: Negative for chest pain and leg swelling.   Gastrointestinal: Negative for blood in stool, constipation, diarrhea, nausea, rectal pain and vomiting.   Genitourinary: Positive for frequency and urgency. Negative for decreased urine volume, difficulty urinating, dysuria, enuresis, flank pain, genital sores and hematuria.   Musculoskeletal: Negative for back pain and joint swelling.   Skin: Negative for rash and wound.   Neurological: Negative for seizures, speech difficulty, weakness and headaches.   Psychiatric/Behavioral: Negative for confusion, sleep disturbance and suicidal ideas. The patient is not nervous/anxious.       I have reviewed the ROS documented by my clinical staff, updated appropriate and I agree. Karla Leiva MA    Past Medical History:  Past Medical History:   Diagnosis Date   • Allergic rhinitis    • Carter esophagus 2006    EGD WITH BIOPSY      ,, -, 2011   • Benign essential hypertension    • Breast complaint     \"DIMPLE\" DX MMG AND ULTRASOUND, ALL NORMAL   • Cataract     NOT IN NEED OF SURGERY YET   • Class 1 obesity in adult    • Diabetes mellitus, type 2 (HCC)    • Diabetic peripheral neuropathy associated with type 2 diabetes mellitus (HCC)    • Diverticulitis     IV ATBX - " IMPROVED   • Diverticulosis of large intestine without hemorrhage 2011   • Edema     WITH VENOUS INSUFFICIENCY   • Fatigue    • Fibromyalgia    • Gastroesophageal reflux disease without esophagitis    • Hammer toe    • Herpes simplex     GENITAL  - CONTROLLED W/ CHRONIC FAMVIR   • Hiatal hernia with gastroesophageal reflux    • High risk medication use    • Irritable bowel syndrome    • Irritable bowel syndrome with constipation    • Left retinal detachment 2010    LASER TREATMENT, SUCCESSFUL   • Microscopic hematuria 1995    IVP/CYSTOSCOPY   • Mixed hyperlipidemia    • Lizama neuroma, left 2010    2ND   • Lizama's neuroma of both feet 2007    INJECTION, FAILED, SURGERY PLANNED   • Lizama's neuroma of left foot 2008    NEUROMA SURGICALLY REMOVED, LEFT FOOT   • Onychomycosis of left great toe    • Plantar fasciitis    • 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)    • Rheumatoid arthritis involving multiple joints (HCC)    • Tear of meniscus of left knee 2008   • Vitamin D deficiency        Past Surgical History:  Past Surgical History:   Procedure Laterality Date   • BUNIONECTOMY Bilateral    • COLONOSCOPY  2011   • CYSTOSCOPY     • DILATATION AND CURETTAGE     • ENDOSCOPY  2008    WITH BIOPSY ( 2008,2006, 4-2009, 11/2011)   • KNEE ARTHROSCOPY     • LIZAMA'S NEUROMA EXCISION Left 2008    ALSO 2010   • TONSILLECTOMY AND ADENOIDECTOMY         Medications:    Current Outpatient Medications:   •  amLODIPine (NORVASC) 5 MG tablet, Take 1 tablet by mouth Daily., Disp: , Rfl:   •  dexlansoprazole (DEXILANT) 60 MG capsule, Take 1 capsule by mouth Daily., Disp: , Rfl:   •  DULoxetine (CYMBALTA) 60 MG capsule, Take 1 capsule by mouth Daily., Disp: 90 capsule, Rfl: 3  •  DULoxetine (CYMBALTA) 60 MG capsule, Take 1 capsule by mouth Daily., Disp: , Rfl:   •  estradiol (CLIMARA) 0.075 MG/24HR patch, Place 1 patch on the skin as directed by provider 1 (One) Time Per Week., Disp: , Rfl:   •  gabapentin (NEURONTIN) 100 MG  "capsule, Take 2 capsules by mouth Daily., Disp: , Rfl:   •  Humira Pen 40 MG/0.4ML Pen-injector Kit, , Disp: , Rfl:   •  hydroxychloroquine (PLAQUENIL) 200 MG tablet, Take 1 tablet by mouth 2 (Two) Times a Day., Disp: , Rfl:   •  ibuprofen (ADVIL,MOTRIN) 800 MG tablet, Take 1 tablet by mouth Daily As Needed., Disp: , Rfl:   •  leflunomide (ARAVA) 20 MG tablet, Take 1 tablet by mouth Daily., Disp: , Rfl:   •  metoprolol succinate XL (TOPROL-XL) 100 MG 24 hr tablet, Take 1 tablet by mouth Daily., Disp: , Rfl:   •  pravastatin (PRAVACHOL) 20 MG tablet, Take 1 tablet by mouth Daily., Disp: , Rfl:   •  Premarin 0.625 MG/GM vaginal cream, , Disp: , Rfl:   •  tiZANidine (ZANAFLEX) 4 MG tablet, Take 1 tablet by mouth every night at bedtime., Disp: , Rfl:   •  traMADol (ULTRAM) 50 MG tablet, Take 1 tablet by mouth Every 6 (Six) Hours As Needed for Moderate Pain ., Disp: 270 tablet, Rfl: 1  •  valsartan-hydrochlorothiazide (DIOVAN-HCT) 320-25 MG per tablet, Take 1 tablet by mouth Daily., Disp: , Rfl:     Allergies:  Allergies   Allergen Reactions   • Erythromycin GI Intolerance   • Meloxicam GI Bleeding   • Metronidazole GI Intolerance       Social History:  Social History     Socioeconomic History   • Marital status:    Tobacco Use   • Smoking status: Former Smoker     Packs/day: 1.00     Years: 20.00     Pack years: 20.00     Types: Cigarettes     Start date:      Quit date:      Years since quittin.6   • Smokeless tobacco: Never Used   Substance and Sexual Activity   • Alcohol use: Not Currently   • Drug use: Never   • Sexual activity: Defer       Family History:  Family History   Problem Relation Age of Onset   • Hyperlipidemia Mother    • Hypertension Mother    • Heart failure Mother    • Alzheimer's disease Mother          PVR:   ***     Objective     Physical Exam:   Vital Signs:   Vitals:    22 1318   Weight: 77.6 kg (171 lb)   Height: 154.9 cm (60.98\")     Body mass index is 32.33 kg/m². " "    Physical Exam    Labs:   Brief Urine Lab Results     None               Lab Results   Component Value Date    GLUCOSE 94 06/02/2022    CALCIUM 10.3 06/02/2022     06/02/2022    K 4.5 06/02/2022    CO2 25 06/02/2022     06/02/2022    BUN 24 06/02/2022    CREATININE 0.72 06/02/2022    BCR 33 (H) 06/02/2022       Lab Results   Component Value Date    WBC 6.6 06/02/2022    HGB 14.0 06/02/2022    HCT 41.1 06/02/2022    MCV 91 06/02/2022     06/02/2022       Images:   No Images in the past 120 days found..    Measures:   Tobacco:   Mady Pichardo  reports that she quit smoking about 32 years ago. Her smoking use included cigarettes. She started smoking about 52 years ago. She has a 20.00 pack-year smoking history. She has never used smokeless tobacco.. I have educated her on the risk of diseases from using tobacco products such as {Tobacco Cessation Diseases:94334::\"cancer\",\"COPD\",\"heart disease\"}.     I advised her to quit and she is {Willing/Not Willing to Quit Tobacco Products:19418}.    I spent {Time Spent Tobacco :69054} minutes counseling the patient.           Urine Incontinence: ( NOUI)  ***     Assessment / Plan      Assessment:  Mrs. Pichardo is a 77 y.o. female who presents today with lower urinary tract symptoms including ***.    There are no diagnoses linked to this encounter.    Patient Education:     Today we discussed in depth the etiology and management of pelvic floor dysfunction.  We discussed the pelvic floor dysfunction is a common condition in which the patient is unable to relax or coordinate the muscles of the pelvic floor with urination, bowel movement or intercourse.  We discussed that this is a common condition seen both men and women.  We discussed the role of the pelvic musculature and that it supports the bladder, vagina, rectum.  We discussed the intricate and dynamic interplay between the pelvic floor and these associated organs.  Discussed that the pelvic floor " musculature added support and stability for these organ systems but can also result in symptoms including muscle spasms, pain with urination, bowel movement or sexual intercourse.  We discussed that the causes of pelvic floor dysfunction are often multifactorial.  We discussed that the majority of her symptoms resulted after a pregnancy and that pregnancy can be a common cause of pelvic floor dysfunction but is often not the sole cause.  We discussed that it can often take a prolonged period of multimodal therapy to improve symptoms.  We discussed conservative treatment strategies including biofeedback, pelvic floor physical therapy, medications, relaxing techniques.  We discussed that improving bowel habits and constipation is important.  We discussed the role of pelvic floor physical therapy and the importance of continued therapy.  Discussed that it often takes therapy over a longer period of time to see improvement.  We discussed the role of p.o. medications including muscle relaxants that can help to relax the pelvic floor musculature.  Discussed that these are generally safe but that these medications may have side effects the most common being drowsiness.  Discussed that common muscle relaxant medications include Flexeril, Baclofen, Robaxin. We discussed that these medications often require a balance of how well it helps symptoms potential side effects.  We will continue to monitor.***     We also discussed in depth the etiology and management of urinary tract infection and recurrent urinary tract infection.  We discussed the important principal that the definition of a diagnosed UTI requires a urine culture.  We discussed that a presumptive diagnosis of UTI cannot be made with just signs symptoms or urinalysis.  We discussed that urinalysis is not sufficient to diagnose a UTI.  We discussed the difference between unresolved versus recurrent urinary tract infection.  We discussed that recurrent UTIs typically  defined as greater than 2 UTIs within 6 months or greater than 3 within 1 year.  Discussed that the evaluation for recurrent UTI should include a history physical exam and a urine culture.  We discussed the recurrent infections can be a single complicated UTI versus a reinfection.  We discussed the indication for evaluation of the urinary tract when recurrent infections are complicated, occur frequently, or and symptoms persist long after an infection is eradicated.  We discussed therapies to prevent recurrence of infections.  Discussed conservative therapies including increased hydration appropriate , urinary hygiene, decreasing constipation, improvement in pelvic floor related symptoms, decreasing irritative food/fluid intake.  We discussed topical vaginal estrogen and the role this plays in decreasing the rate of infection.  We discussed that topical vaginal estrogen decreases vaginal pH, normalizes the vaginal sophy, and decreases contamination.  We discussed the role  of prophylactic anti-biotic and self start anti-biotic.  We discussed mechanisms to prevent infection including oral cranberry supplement, d-mannose, methenamine.   We discussed that methenamine therapy is not a treatment for an active infection and must be discontinued during active infection.  We discussed the side effects.  We discussed the role for laboratory testing when on this therapy.   We discussed that we will unlikely be able to eradicate all infection but our goal is to decrease the number she has per year.  Patient is understanding and agreeable with the plan of care above to decrease the rate of infections.***    Today we discussed the etiology and management of LUTS/OAB. We discussed work-up including history and physical exam as well as urinalysis.  We discussed PVR.  We discussed evaluation and management of urinary urgency and overactivity of the bladder.  We discussed conservative management and behavioral strategies including  decreasing irritative p.o. fluid intake, timed voiding, double voiding.  We discussed the role that constipation can play in lower urinary tract symptoms and improving bowel hygiene.  We discussed management of overactive bladder including conservative p.o. medication options.  We discussed anticholinergic medication class and the risks and benefits of this medication including side effects including dry mouth, dry, constipation and others.  We discussed that there are additional medications including beta-3 agonist, and associated risks and benefits including hypertension and requirement to monitor blood pressure after starting.  We also discussed  third line potential procedural interventions including intradetrusor botox injections and sacral nerve neuromodulation. We discussed risks and benefits of these procedures.  I discussed the role of a bladder diary and how this will help identify her most concerning urinary symptoms.***           Follow Up:   No follow-ups on file.    I spent approximately *** minutes providing clinical care for this patient; including review of patient's chart and provider documentation, face to face time spent with patient in examination room (obtaining history, performing physical exam, discussing diagnosis and management options), placing orders, and completing patient documentation.     Jacob Sterling MD  [facility] [place]

## 2022-08-11 NOTE — PROGRESS NOTES
Follow Up Office Visit      Patient Name: Mady Pichardo  : 1945   MRN: [id number]     Chief Complaint:    Chief Complaint   Patient presents with   • OAB, Urgency, Cystoscopy         History of Present Illness: Mady Pichardo is a 77 y.o. female who presents today for 4-week follow-up based upon urinary symptoms including frequency, urgency, urge associated incontinence.  Of note, she has a prior history of vaginal mesh extrusion after hysterectomy and vaginal mesh placement for prolapse repair many years ago.  She reports that she required second procedure for extrusion closure.  She presents today for cystoscopy for evaluation of urinary bladder to ensure that there is no vaginal mesh erosion within the urethra or urinary bladder resulting in urinary symptoms.  She denies hematuria.  Denies history of urinary tract infection.  Additionally, she presents today for 4-week follow-up with bladder diary.    Subjective      Review of System: Review of Systems   Constitutional: Negative for chills, fatigue, fever and unexpected weight change.   HENT: Negative for sore throat.    Eyes: Negative for visual disturbance.   Respiratory: Negative for cough, chest tightness and shortness of breath.    Cardiovascular: Negative for chest pain and leg swelling.   Gastrointestinal: Negative for blood in stool, constipation, diarrhea, nausea, rectal pain and vomiting.   Genitourinary: Negative for decreased urine volume, difficulty urinating, dysuria, enuresis, flank pain, frequency, genital sores, hematuria and urgency.   Musculoskeletal: Negative for back pain and joint swelling.   Skin: Negative for rash and wound.   Neurological: Negative for seizures, speech difficulty, weakness and headaches.   Psychiatric/Behavioral: Negative for confusion, sleep disturbance and suicidal ideas. The patient is not nervous/anxious.       I have reviewed the ROS documented by my clinical staff, updated as appropriate and I agree.  Jacob Sterling MD    I have reviewed and the following portions of the patient's history were updated as appropriate: past family history, past medical history, past social history, past surgical history and problem list.    Medications:     Current Outpatient Medications:   •  amLODIPine (NORVASC) 5 MG tablet, Take 1 tablet by mouth Daily., Disp: , Rfl:   •  dexlansoprazole (DEXILANT) 60 MG capsule, Take 1 capsule by mouth Daily., Disp: , Rfl:   •  DULoxetine (CYMBALTA) 60 MG capsule, Take 1 capsule by mouth Daily., Disp: , Rfl:   •  estradiol (CLIMARA) 0.075 MG/24HR patch, Place 1 patch on the skin as directed by provider 1 (One) Time Per Week., Disp: , Rfl:   •  gabapentin (NEURONTIN) 100 MG capsule, Take 2 capsules by mouth Daily., Disp: , Rfl:   •  Humira Pen 40 MG/0.4ML Pen-injector Kit, , Disp: , Rfl:   •  hydroxychloroquine (PLAQUENIL) 200 MG tablet, Take 1 tablet by mouth 2 (Two) Times a Day., Disp: , Rfl:   •  ibuprofen (ADVIL,MOTRIN) 800 MG tablet, Take 1 tablet by mouth Daily As Needed., Disp: , Rfl:   •  leflunomide (ARAVA) 20 MG tablet, Take 1 tablet by mouth Daily., Disp: , Rfl:   •  metoprolol succinate XL (TOPROL-XL) 100 MG 24 hr tablet, Take 1 tablet by mouth Daily., Disp: , Rfl:   •  pravastatin (PRAVACHOL) 20 MG tablet, Take 1 tablet by mouth Daily., Disp: , Rfl:   •  Premarin 0.625 MG/GM vaginal cream, , Disp: , Rfl:   •  tiZANidine (ZANAFLEX) 4 MG tablet, Take 1 tablet by mouth every night at bedtime., Disp: , Rfl:   •  traMADol (ULTRAM) 50 MG tablet, Take 1 tablet by mouth Every 6 (Six) Hours As Needed for Moderate Pain ., Disp: 270 tablet, Rfl: 1  •  valsartan-hydrochlorothiazide (DIOVAN-HCT) 320-25 MG per tablet, Take 1 tablet by mouth Daily., Disp: , Rfl:   •  DULoxetine (CYMBALTA) 60 MG capsule, Take 1 capsule by mouth Daily., Disp: 90 capsule, Rfl: 3  •  Mirabegron ER (Myrbetriq) 25 MG tablet sustained-release 24 hour 24 hr tablet, Take 1 tablet by mouth Daily., Disp: 30 tablet, Rfl:  "0    Allergies:   Allergies   Allergen Reactions   • Erythromycin GI Intolerance   • Meloxicam GI Bleeding   • Metronidazole GI Intolerance         Objective     Physical Exam:   Vital Signs:   Vitals:    08/11/22 1318   Weight: 77.6 kg (171 lb)   Height: 154.9 cm (60.98\")   PainSc: 0-No pain     Body mass index is 32.33 kg/m².     Physical Exam  Vitals and nursing note reviewed. Exam conducted with a chaperone present.   Constitutional:       Appearance: Normal appearance.   HENT:      Head: Normocephalic and atraumatic.   Cardiovascular:      Comments: Well perfused  Pulmonary:      Effort: Pulmonary effort is normal.   Abdominal:      General: Abdomen is flat.      Palpations: Abdomen is soft.   Genitourinary:     General: Normal vulva.      Exam position: Supine.      Urethra: No urethral pain.      Vagina: Normal. No tenderness.      Comments: No evidence of prior vaginal mesh extrusion  Musculoskeletal:         General: Normal range of motion.   Skin:     General: Skin is warm and dry.   Neurological:      General: No focal deficit present.      Mental Status: She is alert and oriented to person, place, and time. Mental status is at baseline.   Psychiatric:         Mood and Affect: Mood normal.         Behavior: Behavior normal.         Thought Content: Thought content normal.         Judgment: Judgment normal.           Labs:   Brief Urine Lab Results     None               Lab Results   Component Value Date    GLUCOSE 94 06/02/2022    CALCIUM 10.3 06/02/2022     06/02/2022    K 4.5 06/02/2022    CO2 25 06/02/2022     06/02/2022    BUN 24 06/02/2022    CREATININE 0.72 06/02/2022    BCR 33 (H) 06/02/2022       Lab Results   Component Value Date    WBC 6.6 06/02/2022    HGB 14.0 06/02/2022    HCT 41.1 06/02/2022    MCV 91 06/02/2022     06/02/2022         CYSTOSCOPY    Preprocedure diagnosis:  Lower urinary tract symptoms  History of vaginal mesh extrusion    Postprocedure " diagnosis:  Same    Procedure:  Diagnostic Cystourethroscopy    Attending surgeon:  Jacob Sterling MD    Anesthesia:  2% lidocaine jelly intraurethrally    Complications:  None    Indications:  77 y.o. female undergoing a diagnostic cystoscopy for the above mentioned indications. Informed consent was obtained prior to the procedure start.       Findings:  Urethral meatus without mass or stricture. No concerning findings within the urethra. Cystoscopy revealed normal bladder mucosa with NO tumors, masses, stones or trabeculations noted.  No evidence of mesh noted within urethra or bladder.    Procedure:  The patient was placed in supine position and prepped and draped in sterile fashion with lidocaine jelly instilled 5 minutes pre-procedure start.  A brief timeout including available nursing staff and awake patient was performed.  The 16 Fr digital flexible cystoscope was lubricated and gently placed into the urethral meatus. The proximal and distal portions of the urethra appeared well vascularized and normal in appearance. The bladder neck was visualized and appeared well vascularized without mucosal lesions or abnormal appearance. The bladder was then entered and  completely visualized including the trigone. There were bilateral orthotopic ureteral orifices which appeared patent and effluxed clear yellow urine. The posterior wall, lateral walls, anterior wall, and dome were visualized. The cystoscope was then retroflexed and the bladder neck was further visualized and appeared normal.  The scope was gently withdrawn and the procedure terminated.  The patient tolerated the procedure well.           Assessment / Plan      Assessment/Plan:   77 y.o. female is seen today for 4-week follow-up with bladder diary and for cystoscopy.  Bladder diary was reviewed.  Cystoscopy demonstrated no evidence of urethral or bladder mesh erosion.  Today we have discussed second line strategies for management of overactivity,  frequency, urgency and urge associated incontinence including p.o. medical management.  We have discussed the risks and benefits of anticholinergic versus beta 3 agonist.  We have discussed the risks and benefits of Myrbetriq 25 mg daily.  Discussed side effects including hypertension and the indication for blood pressure monitoring.  Discussed that I would like her to follow-up with PCP for blood pressure check in 1 week as well as daily monitoring of blood pressure at home.  Discussed that if she has elevated blood pressures to discontinue medication.  She will follow-up in 4 weeks for a symptom check.  We will further discuss p.o. medical management versus third line procedural intervention pending her symptomatic improvement.    Diagnoses and all orders for this visit:    1. Lower urinary tract symptoms (LUTS) (Primary)    2. Urge urinary incontinence  -     Mirabegron ER (Myrbetriq) 25 MG tablet sustained-release 24 hour 24 hr tablet; Take 1 tablet by mouth Daily.  Dispense: 30 tablet; Refill: 0    3. OAB (overactive bladder)  -     Mirabegron ER (Myrbetriq) 25 MG tablet sustained-release 24 hour 24 hr tablet; Take 1 tablet by mouth Daily.  Dispense: 30 tablet; Refill: 0         Follow Up:   Return in about 4 weeks (around 9/8/2022) for Recheck.     I spent approximately 30 minutes providing clinical care for this patient; including review of patient's chart and provider documentation, face to face time spent with patient in examination room (obtaining history, performing physical exam, discussing diagnosis and management options), placing orders, and completing patient documentation.     Jacob Sterling MD  [facility] [place]

## 2022-09-12 ENCOUNTER — OFFICE VISIT (OUTPATIENT)
Dept: UROLOGY | Facility: CLINIC | Age: 77
End: 2022-09-12

## 2022-09-12 VITALS — WEIGHT: 171 LBS | BODY MASS INDEX: 32.28 KG/M2 | HEIGHT: 61 IN

## 2022-09-12 DIAGNOSIS — R39.9 LOWER URINARY TRACT SYMPTOMS (LUTS): ICD-10-CM

## 2022-09-12 DIAGNOSIS — N39.41 URGE INCONTINENCE: ICD-10-CM

## 2022-09-12 DIAGNOSIS — N32.81 OAB (OVERACTIVE BLADDER): Primary | ICD-10-CM

## 2022-09-12 DIAGNOSIS — R39.15 URGENCY OF URINATION: ICD-10-CM

## 2022-09-12 PROCEDURE — 51798 US URINE CAPACITY MEASURE: CPT | Performed by: UROLOGY

## 2022-09-12 PROCEDURE — 99214 OFFICE O/P EST MOD 30 MIN: CPT | Performed by: UROLOGY

## 2022-09-12 NOTE — PROGRESS NOTES
Follow Up Office Visit      Patient Name: Mady Pichardo  : 1945   MRN: [id number]     Chief Complaint:    Chief Complaint   Patient presents with   • Lower urinary tract symptoms (LUTS)     History of Present Illness: Mady Pichardo is a 77 y.o. female who presents today for follow up due to history of lower urinary tract symptoms, overactivity the bladder including urinary frequency, urgency and urge associated incontinence.  At last follow-up the patient was started on Myrbetriq 25 mg.  She reports onset of GI symptoms including nausea and emesis.  She has discontinued medication with improvement in the symptoms.  She continues to report bother associated with above urinary symptoms.  She reports bother with urinary urgency and urinary incontinence associated with urge.    Subjective      Review of System: Review of Systems   Constitutional: Negative for chills, fatigue, fever and unexpected weight change.   HENT: Negative for sore throat.    Eyes: Negative for visual disturbance.   Respiratory: Negative for cough, chest tightness and shortness of breath.    Cardiovascular: Negative for chest pain and leg swelling.   Gastrointestinal: Negative for blood in stool, constipation, diarrhea, nausea, rectal pain and vomiting.   Genitourinary: Positive for enuresis and frequency. Negative for decreased urine volume, difficulty urinating, dysuria, flank pain, genital sores, hematuria and urgency.   Musculoskeletal: Negative for back pain and joint swelling.   Skin: Negative for rash and wound.   Neurological: Negative for seizures, speech difficulty, weakness and headaches.   Psychiatric/Behavioral: Negative for confusion, sleep disturbance and suicidal ideas. The patient is not nervous/anxious.       I have reviewed the ROS documented by my clinical staff, updated as appropriate and I agree. Jacob Sterling MD    I have reviewed and the following portions of the patient's history were updated as appropriate:  past family history, past medical history, past social history, past surgical history and problem list.    Medications:     Current Outpatient Medications:   •  amLODIPine (NORVASC) 5 MG tablet, Take 1 tablet by mouth Daily., Disp: , Rfl:   •  dexlansoprazole (DEXILANT) 60 MG capsule, Take 1 capsule by mouth Daily., Disp: , Rfl:   •  DULoxetine (CYMBALTA) 60 MG capsule, Take 1 capsule by mouth Daily., Disp: , Rfl:   •  estradiol (CLIMARA) 0.075 MG/24HR patch, Place 1 patch on the skin as directed by provider 1 (One) Time Per Week., Disp: , Rfl:   •  gabapentin (NEURONTIN) 100 MG capsule, Take 2 capsules by mouth Daily., Disp: , Rfl:   •  Humira Pen 40 MG/0.4ML Pen-injector Kit, , Disp: , Rfl:   •  hydroxychloroquine (PLAQUENIL) 200 MG tablet, Take 1 tablet by mouth 2 (Two) Times a Day., Disp: , Rfl:   •  ibuprofen (ADVIL,MOTRIN) 800 MG tablet, Take 1 tablet by mouth Daily As Needed., Disp: , Rfl:   •  leflunomide (ARAVA) 20 MG tablet, Take 1 tablet by mouth Daily., Disp: , Rfl:   •  metoprolol succinate XL (TOPROL-XL) 100 MG 24 hr tablet, Take 1 tablet by mouth Daily., Disp: , Rfl:   •  pravastatin (PRAVACHOL) 20 MG tablet, Take 1 tablet by mouth Daily., Disp: , Rfl:   •  Premarin 0.625 MG/GM vaginal cream, , Disp: , Rfl:   •  tiZANidine (ZANAFLEX) 4 MG tablet, Take 1 tablet by mouth every night at bedtime., Disp: , Rfl:   •  traMADol (ULTRAM) 50 MG tablet, Take 1 tablet by mouth Every 6 (Six) Hours As Needed for Moderate Pain ., Disp: 270 tablet, Rfl: 1  •  valsartan-hydrochlorothiazide (DIOVAN-HCT) 320-25 MG per tablet, Take 1 tablet by mouth Daily., Disp: , Rfl:   •  DULoxetine (CYMBALTA) 60 MG capsule, Take 1 capsule by mouth Daily., Disp: 90 capsule, Rfl: 3  •  Mirabegron ER (Myrbetriq) 25 MG tablet sustained-release 24 hour 24 hr tablet, Take 1 tablet by mouth Daily., Disp: 30 tablet, Rfl: 0    Allergies:   Allergies   Allergen Reactions   • Myrbetriq [Mirabegron] Nausea And Vomiting   • Erythromycin GI  "Intolerance   • Meloxicam GI Bleeding   • Metronidazole GI Intolerance         Post void residual bladder scan:   63mL    Objective     Physical Exam:   Vital Signs:   Vitals:    09/12/22 1036   Weight: 77.6 kg (171 lb)   Height: 154.9 cm (60.98\")   PainSc: 0-No pain     Body mass index is 32.33 kg/m².     Physical Exam  Vitals and nursing note reviewed.   Constitutional:       Appearance: Normal appearance.   HENT:      Head: Normocephalic and atraumatic.   Cardiovascular:      Comments: Well perfused  Pulmonary:      Effort: Pulmonary effort is normal.   Abdominal:      General: Abdomen is flat.      Palpations: Abdomen is soft.   Musculoskeletal:         General: Normal range of motion.   Skin:     General: Skin is warm and dry.   Neurological:      General: No focal deficit present.      Mental Status: She is alert and oriented to person, place, and time. Mental status is at baseline.   Psychiatric:         Mood and Affect: Mood normal.         Behavior: Behavior normal.         Thought Content: Thought content normal.         Judgment: Judgment normal.           Labs:   Brief Urine Lab Results     None               Lab Results   Component Value Date    GLUCOSE 94 06/02/2022    CALCIUM 10.3 06/02/2022     06/02/2022    K 4.5 06/02/2022    CO2 25 06/02/2022     06/02/2022    BUN 24 06/02/2022    CREATININE 0.72 06/02/2022    BCR 33 (H) 06/02/2022       Lab Results   Component Value Date    WBC 6.6 06/02/2022    HGB 14.0 06/02/2022    HCT 41.1 06/02/2022    MCV 91 06/02/2022     06/02/2022       Images:   No Images in the past 120 days found..    Measures:   Tobacco:   Mady Pichardo  reports that she quit smoking about 32 years ago. Her smoking use included cigarettes. She started smoking about 52 years ago. She has a 20.00 pack-year smoking history. She has never used smokeless tobacco.. I have educated her on the risk of diseases from using tobacco products.           Urine Incontinence: ( " NOUI)  Patient seen and evaluated for urinary incontinence, urge predominant incontinence.  Assessment / Plan      Assessment/Plan:   77 y.o. female is seen today for follow up due to history of lower urinary tract symptoms/overactive the bladder including urinary frequency, urgency and urge associated incontinence.  At last visit she was started on beta 3 agonist and reports discontinuing medication due to side side effect intolerance.  She continues to report significant bother associated with symptoms.  She has previously trialed conservative strategies, p.o. medical strategies without improvement in symptoms.  Today we have discussed third line options including intra detrusor injection of Botox versus sacral neurostimulator placement.  We have discussed the risks and benefits of each of these procedural types.  We have discussed PNE trial, trial.  For evaluation improvement in symptoms prior to sacral neurostimulator placement.  She is interested in considering this procedural type.  We will connect her with the [email or web address] device representatives for further education and discussion.  She was provided educational material today.  She will follow-up in 2 to 3 weeks for further discussion after reviewing educational material and discussing with device representative.  She is understanding agreeable plan of care..     Diagnoses and all orders for this visit:    1. OAB (overactive bladder) (Primary)    2. Lower urinary tract symptoms (LUTS)    3. Urgency of urination    4. Urge incontinence         Follow Up:   Return in about 2 weeks (around 9/26/2022) for Recheck.     I spent approximately 30 minutes providing clinical care for this patient; including review of patient's chart and provider documentation, face to face time spent with patient in examination room (obtaining history, performing physical exam, discussing diagnosis and management options), placing orders, and completing patient documentation.     Jacob  MD Asher  [facility] [place]

## 2022-10-10 ENCOUNTER — OFFICE VISIT (OUTPATIENT)
Dept: UROLOGY | Facility: CLINIC | Age: 77
End: 2022-10-10

## 2022-10-10 VITALS — HEIGHT: 61 IN | BODY MASS INDEX: 32.28 KG/M2 | WEIGHT: 171 LBS

## 2022-10-10 DIAGNOSIS — R39.15 URGENCY OF URINATION: Primary | ICD-10-CM

## 2022-10-10 DIAGNOSIS — R39.9 LOWER URINARY TRACT SYMPTOMS (LUTS): ICD-10-CM

## 2022-10-10 DIAGNOSIS — N39.41 URGE INCONTINENCE: ICD-10-CM

## 2022-10-10 DIAGNOSIS — N32.81 OAB (OVERACTIVE BLADDER): ICD-10-CM

## 2022-10-10 PROCEDURE — 51798 US URINE CAPACITY MEASURE: CPT | Performed by: UROLOGY

## 2022-10-10 PROCEDURE — 99215 OFFICE O/P EST HI 40 MIN: CPT | Performed by: UROLOGY

## 2022-10-10 RX ORDER — CELECOXIB 100 MG/1
100 CAPSULE ORAL DAILY
COMMUNITY
Start: 2022-09-15

## 2022-10-10 NOTE — PROGRESS NOTES
LUTS Female Office Visit      Patient Name: Mady Pichardo  : 1945   MRN: [id number]     Chief Complaint:  Lower Urinary Tract Symptoms.     History of Present Illness: Mrs. Pichardo is a 77 y.o. female with history of lower urinary tract symptoms.  The patient has longstanding history of urinary symptoms including frequency, urgency, urge associated incontinence.  The patient has attempted conservative strategies for the reduction of her symptoms as well as p.o. medical management, has discontinued p.o. medicines due to associated side effects.  She presents today for further discussion regarding third line management strategies for her overactive bladder symptoms.  She continues to report significant bother associated with symptoms.      Subjective      Review of System: Review of Systems   Constitutional: Negative.  Negative for chills, fatigue, fever and unexpected weight change.   HENT: Negative.  Negative for sore throat.    Eyes: Negative.  Negative for visual disturbance.   Respiratory: Negative.  Negative for cough, chest tightness and shortness of breath.    Cardiovascular: Negative.  Negative for chest pain and leg swelling.   Gastrointestinal: Negative.  Negative for blood in stool, constipation, diarrhea, nausea, rectal pain and vomiting.   Endocrine: Negative.    Genitourinary: Positive for frequency and urgency. Negative for decreased urine volume, difficulty urinating, dysuria, enuresis, flank pain, genital sores and hematuria.   Musculoskeletal: Negative.  Negative for back pain and joint swelling.   Skin: Negative.  Negative for rash and wound.   Allergic/Immunologic: Negative.    Neurological: Positive for light-headedness. Negative for seizures, speech difficulty, weakness and headaches.   Hematological: Negative.    Psychiatric/Behavioral: Negative.  Negative for confusion, sleep disturbance and suicidal ideas. The patient is not nervous/anxious.       I have reviewed the ROS  "documented by my clinical staff, updated appropriate and I agree. Jacob Sterling MD    Past Medical History:  Past Medical History:   Diagnosis Date   • Allergic rhinitis    • Carter esophagus 2006    EGD WITH BIOPSY      2008,2006, 4-2009, 11/2011   • Benign essential hypertension    • Breast complaint 2008    \"DIMPLE\" DX MMG AND ULTRASOUND, ALL NORMAL   • Cataract     NOT IN NEED OF SURGERY YET   • Class 1 obesity in adult    • Diabetes mellitus, type 2 ([facility]) 2013   • Diabetic peripheral neuropathy associated with type 2 diabetes mellitus ([facility])    • Diverticulitis 2005    IV ATBX - IMPROVED   • Diverticulosis of large intestine without hemorrhage 2011   • Edema     WITH VENOUS INSUFFICIENCY   • Fatigue    • Fibromyalgia    • Gastroesophageal reflux disease without esophagitis    • Hammer toe    • Herpes simplex     GENITAL  - CONTROLLED W/ CHRONIC FAMVIR   • Hiatal hernia with gastroesophageal reflux    • High risk medication use    • Irritable bowel syndrome    • Irritable bowel syndrome with constipation    • Left retinal detachment 2010    LASER TREATMENT, SUCCESSFUL   • Microscopic hematuria 1995    IVP/CYSTOSCOPY   • Mixed hyperlipidemia    • Lizama neuroma, 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   • Lizama's neuroma of both feet 2007    INJECTION, FAILED, SURGERY PLANNED   • Lizama's neuroma of left foot 2008    NEUROMA SURGICALLY REMOVED, LEFT FOOT   • Onychomycosis of left great toe    • Plantar fasciitis    • Recurrent major depression in partial remission ([facility])    • Rheumatoid arthritis involving multiple joints ([facility])    • Tear of meniscus of left knee 2008   • Vitamin D deficiency        Past Surgical History:  Past Surgical History:   Procedure Laterality Date   • BUNIONECTOMY Bilateral    • COLONOSCOPY  2011   • CYSTOSCOPY     • DILATATION AND CURETTAGE     • ENDOSCOPY  2008    WITH BIOPSY ( 2008,2006, 4-2009, 11/2011)   • KNEE ARTHROSCOPY     • LIZAMA'S NEUROMA EXCISION Left 2008    ALSO 2010   • TONSILLECTOMY AND " ADENOIDECTOMY         Medications:    Current Outpatient Medications:   •  amLODIPine (NORVASC) 5 MG tablet, Take 1 tablet by mouth Daily., Disp: , Rfl:   •  celecoxib (CeleBREX) 100 MG capsule, Take 1 capsule by mouth Daily., Disp: , Rfl:   •  dexlansoprazole (DEXILANT) 60 MG capsule, Take 1 capsule by mouth Daily., Disp: , Rfl:   •  DULoxetine (CYMBALTA) 60 MG capsule, Take 1 capsule by mouth Daily., Disp: , Rfl:   •  gabapentin (NEURONTIN) 100 MG capsule, Take 2 capsules by mouth Daily., Disp: , Rfl:   •  Humira Pen 40 MG/0.4ML Pen-injector Kit, , Disp: , Rfl:   •  hydroxychloroquine (PLAQUENIL) 200 MG tablet, Take 1 tablet by mouth 2 (Two) Times a Day., Disp: , Rfl:   •  ibuprofen (ADVIL,MOTRIN) 800 MG tablet, Take 1 tablet by mouth Daily As Needed., Disp: , Rfl:   •  leflunomide (ARAVA) 20 MG tablet, Take 1 tablet by mouth Daily., Disp: , Rfl:   •  metoprolol succinate XL (TOPROL-XL) 100 MG 24 hr tablet, Take 1 tablet by mouth Daily., Disp: , Rfl:   •  pravastatin (PRAVACHOL) 20 MG tablet, Take 1 tablet by mouth Daily., Disp: , Rfl:   •  Premarin 0.625 MG/GM vaginal cream, , Disp: , Rfl:   •  tiZANidine (ZANAFLEX) 4 MG tablet, Take 1 tablet by mouth every night at bedtime., Disp: , Rfl:   •  traMADol (ULTRAM) 50 MG tablet, Take 1 tablet by mouth Every 6 (Six) Hours As Needed for Moderate Pain ., Disp: 270 tablet, Rfl: 1  •  valsartan-hydrochlorothiazide (DIOVAN-HCT) 320-25 MG per tablet, Take 1 tablet by mouth Daily., Disp: , Rfl:   •  DULoxetine (CYMBALTA) 60 MG capsule, Take 1 capsule by mouth Daily., Disp: 90 capsule, Rfl: 3  •  estradiol (CLIMARA) 0.075 MG/24HR patch, Place 1 patch on the skin as directed by provider 1 (One) Time Per Week., Disp: , Rfl:   •  Mirabegron ER (Myrbetriq) 25 MG tablet sustained-release 24 hour 24 hr tablet, Take 1 tablet by mouth Daily., Disp: 30 tablet, Rfl: 0    Allergies:  Allergies   Allergen Reactions   • Myrbetriq [Mirabegron] Nausea And Vomiting   • Erythromycin GI  "Intolerance   • Meloxicam GI Bleeding   • Metronidazole GI Intolerance       Social History:  Social History     Socioeconomic History   • Marital status:    Tobacco Use   • Smoking status: Former     Packs/day: 1.00     Years: 20.00     Pack years: 20.00     Types: Cigarettes     Start date:      Quit date:      Years since quittin.8   • Smokeless tobacco: Never   Vaping Use   • Vaping Use: Never used   Substance and Sexual Activity   • Alcohol use: Not Currently   • Drug use: Never   • Sexual activity: Defer       Family History:  Family History   Problem Relation Age of Onset   • Hyperlipidemia Mother    • Hypertension Mother    • Heart failure Mother    • Alzheimer's disease Mother          PVR:   0mL    Objective     Physical Exam:   Vital Signs:   Vitals:    10/10/22 1106   Weight: 77.6 kg (171 lb)   Height: 154.9 cm (60.98\")   PainSc: 0-No pain     Body mass index is 32.33 kg/m².     Physical Exam  Vitals and nursing note reviewed.   Constitutional:       Appearance: Normal appearance.   HENT:      Head: Normocephalic and atraumatic.   Cardiovascular:      Comments: Well perfused  Pulmonary:      Effort: Pulmonary effort is normal.   Abdominal:      General: Abdomen is flat.      Palpations: Abdomen is soft.   Musculoskeletal:         General: Normal range of motion.   Skin:     General: Skin is warm and dry.   Neurological:      General: No focal deficit present.      Mental Status: She is alert and oriented to person, place, and time. Mental status is at baseline.   Psychiatric:         Mood and Affect: Mood normal.         Behavior: Behavior normal.         Thought Content: Thought content normal.         Judgment: Judgment normal.         Labs:   Brief Urine Lab Results     None               Lab Results   Component Value Date    GLUCOSE 94 2022    CALCIUM 10.3 2022     2022    K 4.5 2022    CO2 25 2022     2022    BUN 24 2022    " CREATININE 0.72 06/02/2022    BCR 33 (H) 06/02/2022       Lab Results   Component Value Date    WBC 6.6 06/02/2022    HGB 14.0 06/02/2022    HCT 41.1 06/02/2022    MCV 91 06/02/2022     06/02/2022       Images:   No Images in the past 120 days found..    Measures:   Tobacco:   Mady Pichardo  reports that she quit smoking about 32 years ago. Her smoking use included cigarettes. She started smoking about 52 years ago. She has a 20.00 pack-year smoking history. She has never used smokeless tobacco.. I have educated her on the risk of diseases from using tobacco products.           Urine Incontinence: ( NOUI)  Patient seen and evaluated for urinary incontinence, urge predominant urinary incontinence    Assessment / Plan      Assessment:  Mrs. Pichardo is a 77 y.o. is seen today for follow up due to history of lower urinary tract symptoms/overactive the bladder including urinary frequency, urgency and urge associated incontinence.  At previous she was started on beta 3 agonist and reports discontinuing medication due to side side effect intolerance.  She continues to report significant bother associated with symptoms.  She has previously trialed conservative strategies, p.o. medical strategies without improvement in symptoms.  Today we have discussed third line options including intra detrusor injection of Botox versus sacral neurostimulator placement.  We have discussed the risks and benefits of each of these procedural types.  We have discussed PNE trial for evaluation improvement in symptoms prior to sacral neurostimulator placement.    We have discussed the risks and benefits of PNE trial.  After our discussion today, her prior review of educational material as well as discussion with [email or web address] device representative she would like to proceed with PNE trial.  We will plan to schedule procedure 11/3/2022 at ambulatory surgery center.    Diagnoses and all orders for this visit:    1. Urgency of urination  (Primary)    2. OAB (overactive bladder)  -     External Facility Surgical/Procedural Request; Future    3. Urge incontinence    4. Lower urinary tract symptoms (LUTS)        Patient Education:       Today we discussed the etiology and management of LUTS/OAB. We discussed work-up including history and physical exam as well as urinalysis.  We discussed PVR.  We discussed evaluation and management of urinary urgency and overactivity of the bladder.  We discussed conservative management and behavioral strategies including decreasing irritative p.o. fluid intake, timed voiding, double voiding.  We discussed the role that constipation can play in lower urinary tract symptoms and improving bowel hygiene.  We discussed management of overactive bladder including conservative p.o. medication options.  We discussed anticholinergic medication class and the risks and benefits of this medication including side effects including dry mouth, dry, constipation and others.  We discussed that there are additional medications including beta-3 agonist, and associated risks and benefits including hypertension and requirement to monitor blood pressure after starting.  We also discussed  third line potential procedural interventions including intradetrusor botox injections and sacral nerve neuromodulation. We discussed risks and benefits of these procedures.        I spent approximately 45 minutes providing clinical care for this patient; including review of patient's chart and provider documentation, face to face time spent with patient in examination room (obtaining history, performing physical exam, discussing diagnosis and management options), placing orders, and completing patient documentation.     Jacob Sterling MD  [facility] [place]

## 2022-10-12 ENCOUNTER — TELEPHONE (OUTPATIENT)
Dept: UROLOGY | Facility: CLINIC | Age: 77
End: 2022-10-12

## 2022-10-12 NOTE — TELEPHONE ENCOUNTER
Provider: DR COLIN  Caller: TAI MYLES      Phone Number: [phone]  Reason for Call: PLS CALL PT TO DISCUSS AXON[employer]X

## 2022-11-03 ENCOUNTER — OUTSIDE FACILITY SERVICE (OUTPATIENT)
Dept: UROLOGY | Facility: CLINIC | Age: 77
End: 2022-11-03

## 2022-11-03 PROCEDURE — 64561 IMPLANT NEUROELECTRODES: CPT | Performed by: UROLOGY

## 2022-11-08 ENCOUNTER — OFFICE VISIT (OUTPATIENT)
Dept: UROLOGY | Facility: CLINIC | Age: 77
End: 2022-11-08

## 2022-11-08 VITALS — HEIGHT: 61 IN | WEIGHT: 171 LBS | BODY MASS INDEX: 32.28 KG/M2

## 2022-11-08 DIAGNOSIS — N32.81 OAB (OVERACTIVE BLADDER): Primary | ICD-10-CM

## 2022-11-08 DIAGNOSIS — R39.9 LOWER URINARY TRACT SYMPTOMS (LUTS): ICD-10-CM

## 2022-11-08 DIAGNOSIS — R39.15 URGENCY OF URINATION: ICD-10-CM

## 2022-11-08 DIAGNOSIS — N39.41 URGE INCONTINENCE: ICD-10-CM

## 2022-11-08 PROCEDURE — 99215 OFFICE O/P EST HI 40 MIN: CPT | Performed by: UROLOGY

## 2022-11-08 NOTE — PROGRESS NOTES
Follow Up Office Visit      Patient Name: Mady Pichardo  : 1945   MRN: [id number]     Chief Complaint: Lower urinary tract symptoms/OAB    History of Present Illness: Mady Pichardo is a 77 y.o. female who presents today for follow up after PNE trial.  The patient has a history of longstanding lower urinary tract symptoms including urinary frequency, urgency and urge associated incontinence.  She has trialed multiple conservative strategies as well as p.o. medical management strategies without significant improvement in symptoms.  She has recently undergone PNE trial with reported greater than 90% improvement in symptoms.  She is very pleased with trial results.  She presents today for further evaluation/bladder diary evaluation.    Subjective      Review of System: Review of Systems   Constitutional: Negative for chills, fatigue, fever and unexpected weight change.   HENT: Negative for sore throat.    Eyes: Negative for visual disturbance.   Respiratory: Negative for cough, chest tightness and shortness of breath.    Cardiovascular: Negative for chest pain and leg swelling.   Gastrointestinal: Negative for blood in stool, constipation, diarrhea, nausea, rectal pain and vomiting.   Genitourinary: Negative for decreased urine volume, difficulty urinating, dysuria, enuresis, flank pain, frequency, genital sores, hematuria and urgency.   Musculoskeletal: Negative for back pain and joint swelling.   Skin: Negative for rash and wound.   Neurological: Negative for seizures, speech difficulty, weakness and headaches.   Psychiatric/Behavioral: Negative for confusion, sleep disturbance and suicidal ideas. The patient is not nervous/anxious.       I have reviewed the ROS documented by my clinical staff, updated as appropriate and I agree. Jacob Sterling MD    I have reviewed and the following portions of the patient's history were updated as appropriate: past family history, past medical history, past social  history, past surgical history and problem list.    Medications:     Current Outpatient Medications:   •  amLODIPine (NORVASC) 5 MG tablet, Take 1 tablet by mouth Daily., Disp: , Rfl:   •  celecoxib (CeleBREX) 100 MG capsule, Take 1 capsule by mouth Daily., Disp: , Rfl:   •  dexlansoprazole (DEXILANT) 60 MG capsule, Take 1 capsule by mouth Daily., Disp: , Rfl:   •  DULoxetine (CYMBALTA) 60 MG capsule, Take 1 capsule by mouth Daily., Disp: , Rfl:   •  estradiol (CLIMARA) 0.075 MG/24HR patch, Place 1 patch on the skin as directed by provider 1 (One) Time Per Week., Disp: , Rfl:   •  gabapentin (NEURONTIN) 100 MG capsule, Take 2 capsules by mouth Daily., Disp: , Rfl:   •  Humira Pen 40 MG/0.4ML Pen-injector Kit, , Disp: , Rfl:   •  hydroxychloroquine (PLAQUENIL) 200 MG tablet, Take 1 tablet by mouth 2 (Two) Times a Day., Disp: , Rfl:   •  ibuprofen (ADVIL,MOTRIN) 800 MG tablet, Take 1 tablet by mouth Daily As Needed., Disp: , Rfl:   •  leflunomide (ARAVA) 20 MG tablet, Take 1 tablet by mouth Daily., Disp: , Rfl:   •  metoprolol succinate XL (TOPROL-XL) 100 MG 24 hr tablet, Take 1 tablet by mouth Daily., Disp: , Rfl:   •  pravastatin (PRAVACHOL) 20 MG tablet, Take 1 tablet by mouth Daily., Disp: , Rfl:   •  tiZANidine (ZANAFLEX) 4 MG tablet, Take 1 tablet by mouth every night at bedtime., Disp: , Rfl:   •  traMADol (ULTRAM) 50 MG tablet, Take 1 tablet by mouth Every 6 (Six) Hours As Needed for Moderate Pain ., Disp: 270 tablet, Rfl: 1  •  valsartan-hydrochlorothiazide (DIOVAN-HCT) 320-25 MG per tablet, Take 1 tablet by mouth Daily., Disp: , Rfl:   •  DULoxetine (CYMBALTA) 60 MG capsule, Take 1 capsule by mouth Daily., Disp: 90 capsule, Rfl: 3  •  Mirabegron ER (Myrbetriq) 25 MG tablet sustained-release 24 hour 24 hr tablet, Take 1 tablet by mouth Daily., Disp: 30 tablet, Rfl: 0  •  Premarin 0.625 MG/GM vaginal cream, , Disp: , Rfl:     Allergies:   Allergies   Allergen Reactions   • Myrbetriq [Mirabegron] Nausea And  "Vomiting   • Erythromycin GI Intolerance   • Meloxicam GI Bleeding   • Metronidazole GI Intolerance         Objective     Physical Exam:   Vital Signs:   Vitals:    11/08/22 1017   Weight: 77.6 kg (171 lb)   Height: 154.9 cm (60.98\")   PainSc: 0-No pain     Body mass index is 32.33 kg/m².     Physical Exam  Vitals and nursing note reviewed.   Constitutional:       Appearance: Normal appearance.   HENT:      Head: Normocephalic and atraumatic.   Cardiovascular:      Comments: Well perfused  Pulmonary:      Effort: Pulmonary effort is normal.   Abdominal:      General: Abdomen is flat.      Palpations: Abdomen is soft.   Musculoskeletal:         General: Normal range of motion.      Comments: PNE leads removed intact, no erythema or drainage noted.   Skin:     General: Skin is warm and dry.   Neurological:      General: No focal deficit present.      Mental Status: She is alert and oriented to person, place, and time. Mental status is at baseline.   Psychiatric:         Mood and Affect: Mood normal.         Behavior: Behavior normal.         Thought Content: Thought content normal.         Judgment: Judgment normal.         Labs:   Brief Urine Lab Results     None               Lab Results   Component Value Date    GLUCOSE 94 06/02/2022    CALCIUM 10.3 06/02/2022     06/02/2022    K 4.5 06/02/2022    CO2 25 06/02/2022     06/02/2022    BUN 24 06/02/2022    CREATININE 0.72 06/02/2022    BCR 33 (H) 06/02/2022       Lab Results   Component Value Date    WBC 6.6 06/02/2022    HGB 14.0 06/02/2022    HCT 41.1 06/02/2022    MCV 91 06/02/2022     06/02/2022       Images:   No Images in the past 120 days found..    Measures:   Tobacco:   Mady Pichardo  reports that she quit smoking about 32 years ago. Her smoking use included cigarettes. She started smoking about 52 years ago. She has a 20.00 pack-year smoking history. She has never used smokeless tobacco.. I have educated her on the risk of diseases from " using tobacco products.           Urine Incontinence: ( NOUI)  Patient seen and evaluated for urinary incontinence, urge predominant urinary incontinence  Assessment / Plan      Assessment/Plan:   77 y.o. female is seen today for follow up of after recent PNE trial for urinary symptoms including frequency, urgency and urge associated incontinence.  She reports greater than 90% symptomatic reduction during PNE trial.  She is very pleased with symptom result.  Today PNE leads were removed intact.  We have discussed the risks and benefits of formal sacral nerve stimulator placement including infection, bleeding, recurrence or nonimprovement in symptoms.  After our discussion, success and PNE trial she elects to proceed.  We will schedule sacral nerve stimulator placement 11/11/2022 at [facility].  All questions answered today, risks and benefits discussed..     Diagnoses and all orders for this visit:    1. OAB (overactive bladder) (Primary)  -     External Facility Surgical/Procedural Request; Future    2. Urge incontinence    3. Lower urinary tract symptoms (LUTS)    4. Urgency of urination        I spent approximately 45 minutes providing clinical care for this patient; including review of patient's chart and provider documentation, face to face time spent with patient in examination room (obtaining history, performing physical exam, discussing diagnosis and management options), placing orders, and completing patient documentation.     Jacob Sterling MD  [facility]

## 2022-11-11 ENCOUNTER — OUTSIDE FACILITY SERVICE (OUTPATIENT)
Dept: UROLOGY | Facility: CLINIC | Age: 77
End: 2022-11-11

## 2022-11-11 PROCEDURE — 95972 ALYS CPLX SP/PN NPGT W/PRGRM: CPT | Performed by: UROLOGY

## 2022-11-11 PROCEDURE — 64590 INS/RPL PRPH SAC/GSTR NPG/R: CPT | Performed by: UROLOGY

## 2022-11-11 PROCEDURE — 64581 OPN IMPLTJ NEA SACRAL NERVE: CPT | Performed by: UROLOGY

## 2022-11-11 PROCEDURE — 76000 FLUOROSCOPY <1 HR PHYS/QHP: CPT | Performed by: UROLOGY

## 2022-12-02 ENCOUNTER — OFFICE VISIT (OUTPATIENT)
Dept: UROLOGY | Facility: CLINIC | Age: 77
End: 2022-12-02

## 2022-12-02 VITALS — BODY MASS INDEX: 32.28 KG/M2 | HEIGHT: 61 IN | WEIGHT: 171 LBS

## 2022-12-02 DIAGNOSIS — R39.9 LOWER URINARY TRACT SYMPTOMS (LUTS): ICD-10-CM

## 2022-12-02 DIAGNOSIS — N32.81 OAB (OVERACTIVE BLADDER): ICD-10-CM

## 2022-12-02 DIAGNOSIS — R39.15 URGENCY OF URINATION: Primary | ICD-10-CM

## 2022-12-02 DIAGNOSIS — N39.41 URGE INCONTINENCE: ICD-10-CM

## 2022-12-02 PROCEDURE — 99024 POSTOP FOLLOW-UP VISIT: CPT | Performed by: UROLOGY

## 2022-12-02 NOTE — PROGRESS NOTES
Follow Up Office Visit      Patient Name: Mady Pichardo  : 1945   MRN: [id number]     Chief Complaint: OAB    History of Present Illness: Mady Pichardo is a 77 y.o. female who presents today for 2-week postoperative follow-up after sacral neurostimulator placement.  She reports she is doing very well in the postoperative setting.  She reports near complete resolution in symptoms including urinary frequency, urgency and urge associated incontinence.  She denies episode of incontinence since procedure.  She continues to report mild bowel urgency.  She denies lower back pain concerns regarding incision.    Subjective      Review of System: Review of Systems   Constitutional: Negative for chills, fatigue, fever and unexpected weight change.   HENT: Negative for sore throat.    Eyes: Negative for visual disturbance.   Respiratory: Negative for cough, chest tightness and shortness of breath.    Cardiovascular: Negative for chest pain and leg swelling.   Gastrointestinal: Negative for blood in stool, constipation, diarrhea, nausea, rectal pain and vomiting.   Genitourinary: Negative for decreased urine volume, difficulty urinating, dysuria, enuresis, flank pain, frequency, genital sores, hematuria and urgency.   Musculoskeletal: Negative for back pain and joint swelling.   Skin: Negative for rash and wound.   Neurological: Negative for seizures, speech difficulty, weakness and headaches.   Psychiatric/Behavioral: Negative for confusion, sleep disturbance and suicidal ideas. The patient is not nervous/anxious.       I have reviewed the ROS documented by my clinical staff, updated as appropriate and I agree. Jacob Sterling MD    I have reviewed and the following portions of the patient's history were updated as appropriate: past family history, past medical history, past social history, past surgical history and problem list.    Medications:     Current Outpatient Medications:   •  amLODIPine (NORVASC) 5 MG  tablet, Take 1 tablet by mouth Daily., Disp: , Rfl:   •  celecoxib (CeleBREX) 100 MG capsule, Take 1 capsule by mouth Daily., Disp: , Rfl:   •  dexlansoprazole (DEXILANT) 60 MG capsule, Take 1 capsule by mouth Daily., Disp: , Rfl:   •  DULoxetine (CYMBALTA) 60 MG capsule, Take 1 capsule by mouth Daily., Disp: , Rfl:   •  estradiol (CLIMARA) 0.075 MG/24HR patch, Place 1 patch on the skin as directed by provider 1 (One) Time Per Week., Disp: , Rfl:   •  gabapentin (NEURONTIN) 100 MG capsule, Take 2 capsules by mouth Daily., Disp: , Rfl:   •  Humira Pen 40 MG/0.4ML Pen-injector Kit, , Disp: , Rfl:   •  hydroxychloroquine (PLAQUENIL) 200 MG tablet, Take 1 tablet by mouth 2 (Two) Times a Day., Disp: , Rfl:   •  ibuprofen (ADVIL,MOTRIN) 800 MG tablet, Take 1 tablet by mouth Daily As Needed., Disp: , Rfl:   •  leflunomide (ARAVA) 20 MG tablet, Take 1 tablet by mouth Daily., Disp: , Rfl:   •  metoprolol succinate XL (TOPROL-XL) 100 MG 24 hr tablet, Take 1 tablet by mouth Daily., Disp: , Rfl:   •  pravastatin (PRAVACHOL) 20 MG tablet, Take 1 tablet by mouth Daily., Disp: , Rfl:   •  tiZANidine (ZANAFLEX) 4 MG tablet, Take 1 tablet by mouth every night at bedtime., Disp: , Rfl:   •  traMADol (ULTRAM) 50 MG tablet, Take 1 tablet by mouth Every 6 (Six) Hours As Needed for Moderate Pain ., Disp: 270 tablet, Rfl: 1  •  valsartan-hydrochlorothiazide (DIOVAN-HCT) 320-25 MG per tablet, Take 1 tablet by mouth Daily., Disp: , Rfl:   •  DULoxetine (CYMBALTA) 60 MG capsule, Take 1 capsule by mouth Daily., Disp: 90 capsule, Rfl: 3  •  Mirabegron ER (Myrbetriq) 25 MG tablet sustained-release 24 hour 24 hr tablet, Take 1 tablet by mouth Daily., Disp: 30 tablet, Rfl: 0  •  Premarin 0.625 MG/GM vaginal cream, , Disp: , Rfl:     Allergies:   Allergies   Allergen Reactions   • Myrbetriq [Mirabegron] Nausea And Vomiting   • Erythromycin GI Intolerance   • Meloxicam GI Bleeding   • Metronidazole GI Intolerance       Objective     Physical Exam:  "  Vital Signs:   Vitals:    12/02/22 1003   Weight: 77.6 kg (171 lb)   Height: 154.9 cm (60.98\")   PainSc: 0-No pain     Body mass index is 32.33 kg/m².     Physical Exam  Vitals and nursing note reviewed.   Constitutional:       Appearance: Normal appearance.   HENT:      Head: Normocephalic and atraumatic.   Cardiovascular:      Comments: Well perfused  Pulmonary:      Effort: Pulmonary effort is normal.   Abdominal:      General: Abdomen is flat.      Palpations: Abdomen is soft.   Musculoskeletal:         General: Normal range of motion.      Comments: Lower back incision clean dry and intact.  No erythema or drainage noted.   Skin:     General: Skin is warm and dry.   Neurological:      General: No focal deficit present.      Mental Status: She is alert and oriented to person, place, and time. Mental status is at baseline.   Psychiatric:         Mood and Affect: Mood normal.         Behavior: Behavior normal.         Thought Content: Thought content normal.         Judgment: Judgment normal.             Labs:   Brief Urine Lab Results     None               Lab Results   Component Value Date    GLUCOSE 94 06/02/2022    CALCIUM 10.3 06/02/2022     06/02/2022    K 4.5 06/02/2022    CO2 25 06/02/2022     06/02/2022    BUN 24 06/02/2022    CREATININE 0.72 06/02/2022    BCR 33 (H) 06/02/2022       Lab Results   Component Value Date    WBC 6.6 06/02/2022    HGB 14.0 06/02/2022    HCT 41.1 06/02/2022    MCV 91 06/02/2022     06/02/2022       Assessment / Plan      Assessment/Plan:   77 y.o. female is seen today for 2-week postoperative follow-up after sacral neurostimulator placement.  She is doing very well in the postoperative setting, reports near resolution of lower urinary tract symptoms.  She is very pleased with outcome.  Incision is clean dry and intact today.  [employer] device representative present and working on any concerns regarding programming questions.  The patient will follow-up in 3 " months for a symptom check.  She will alert "[employer]" with any concerns regarding programming or device..     Diagnoses and all orders for this visit:    1. Urgency of urination (Primary)    2. Lower urinary tract symptoms (LUTS)    3. OAB (overactive bladder)    4. Urge incontinence         Follow Up:   Return in about 3 months (around 3/2/2023) for Recheck.     I spent approximately 20 minutes providing clinical care for this patient; including review of patient's chart and provider documentation, face to face time spent with patient in examination room (obtaining history, performing physical exam, discussing diagnosis and management options), placing orders, and completing patient documentation.     Jacob Sterling MD  [facility] [place]

## 2022-12-09 ENCOUNTER — OFFICE VISIT (OUTPATIENT)
Dept: FAMILY MEDICINE CLINIC | Facility: CLINIC | Age: 77
End: 2022-12-09

## 2022-12-09 DIAGNOSIS — F33.41 RECURRENT MAJOR DEPRESSIVE DISORDER, IN PARTIAL REMISSION: ICD-10-CM

## 2022-12-09 DIAGNOSIS — K21.9 GASTROESOPHAGEAL REFLUX DISEASE WITHOUT ESOPHAGITIS: ICD-10-CM

## 2022-12-09 DIAGNOSIS — M48.061 SPINAL STENOSIS OF LUMBAR REGION WITHOUT NEUROGENIC CLAUDICATION: ICD-10-CM

## 2022-12-09 DIAGNOSIS — E78.2 MIXED HYPERLIPIDEMIA: ICD-10-CM

## 2022-12-09 DIAGNOSIS — Z79.899 ENCOUNTER FOR LONG-TERM (CURRENT) USE OF OTHER MEDICATIONS: ICD-10-CM

## 2022-12-09 DIAGNOSIS — G60.9 IDIOPATHIC PERIPHERAL NEUROPATHY: ICD-10-CM

## 2022-12-09 DIAGNOSIS — N32.81 OAB (OVERACTIVE BLADDER): ICD-10-CM

## 2022-12-09 DIAGNOSIS — Z23 NEED FOR INFLUENZA VACCINATION: ICD-10-CM

## 2022-12-09 DIAGNOSIS — R73.03 PREDIABETES: ICD-10-CM

## 2022-12-09 DIAGNOSIS — K22.70 BARRETT'S ESOPHAGUS WITHOUT DYSPLASIA: ICD-10-CM

## 2022-12-09 DIAGNOSIS — M05.79 RHEUMATOID ARTHRITIS INVOLVING MULTIPLE SITES WITH POSITIVE RHEUMATOID FACTOR: ICD-10-CM

## 2022-12-09 DIAGNOSIS — K58.0 IRRITABLE BOWEL SYNDROME WITH DIARRHEA: ICD-10-CM

## 2022-12-09 DIAGNOSIS — I10 ESSENTIAL HYPERTENSION, BENIGN: Primary | ICD-10-CM

## 2022-12-09 DIAGNOSIS — M79.7 FIBROMYALGIA: ICD-10-CM

## 2022-12-09 LAB
POC AMPHETAMINES: NEGATIVE
POC BARBITURATES: NEGATIVE
POC BENZODIAZEPHINES: NEGATIVE
POC COCAINE: NEGATIVE
POC METHADONE: NEGATIVE
POC METHAMPHETAMINE SCREEN URINE: NEGATIVE
POC OPIATES: NEGATIVE
POC OXYCODONE: NEGATIVE
POC PHENCYCLIDINE: NEGATIVE
POC PROPOXYPHENE: NEGATIVE
POC THC: NEGATIVE
POC TRICYCLIC ANTIDEPRESSANTS: NEGATIVE

## 2022-12-09 PROCEDURE — G0008 ADMIN INFLUENZA VIRUS VAC: HCPCS | Performed by: FAMILY MEDICINE

## 2022-12-09 PROCEDURE — 99214 OFFICE O/P EST MOD 30 MIN: CPT | Performed by: FAMILY MEDICINE

## 2022-12-09 PROCEDURE — 90662 IIV NO PRSV INCREASED AG IM: CPT | Performed by: FAMILY MEDICINE

## 2022-12-09 PROCEDURE — 80305 DRUG TEST PRSMV DIR OPT OBS: CPT | Performed by: FAMILY MEDICINE

## 2022-12-11 ENCOUNTER — TELEPHONE (OUTPATIENT)
Dept: FAMILY MEDICINE CLINIC | Facility: CLINIC | Age: 77
End: 2022-12-11

## 2022-12-11 VITALS
BODY MASS INDEX: 31.85 KG/M2 | DIASTOLIC BLOOD PRESSURE: 78 MMHG | RESPIRATION RATE: 20 BRPM | OXYGEN SATURATION: 94 % | SYSTOLIC BLOOD PRESSURE: 132 MMHG | HEART RATE: 61 BPM | WEIGHT: 168.5 LBS

## 2022-12-11 RX ORDER — AMLODIPINE BESYLATE 5 MG/1
5 TABLET ORAL DAILY
Qty: 90 TABLET | Refills: 3 | Status: SHIPPED | OUTPATIENT
Start: 2022-12-11

## 2022-12-11 RX ORDER — VALSARTAN AND HYDROCHLOROTHIAZIDE 320; 25 MG/1; MG/1
1 TABLET, FILM COATED ORAL DAILY
Qty: 90 TABLET | Refills: 3 | Status: SHIPPED | OUTPATIENT
Start: 2022-12-11

## 2022-12-11 RX ORDER — METOPROLOL SUCCINATE 100 MG/1
100 TABLET, EXTENDED RELEASE ORAL DAILY
Qty: 90 TABLET | Refills: 3 | Status: SHIPPED | OUTPATIENT
Start: 2022-12-11

## 2022-12-11 RX ORDER — DEXLANSOPRAZOLE 60 MG/1
1 CAPSULE, DELAYED RELEASE ORAL DAILY
Qty: 90 CAPSULE | Refills: 3 | Status: SHIPPED | OUTPATIENT
Start: 2022-12-11

## 2022-12-11 RX ORDER — DULOXETIN HYDROCHLORIDE 60 MG/1
60 CAPSULE, DELAYED RELEASE ORAL DAILY
Qty: 90 CAPSULE | Refills: 3 | Status: SHIPPED | OUTPATIENT
Start: 2022-12-11

## 2022-12-11 RX ORDER — PRAVASTATIN SODIUM 20 MG
20 TABLET ORAL DAILY
Qty: 90 TABLET | Refills: 3 | Status: SHIPPED | OUTPATIENT
Start: 2022-12-11

## 2022-12-11 RX ORDER — TIZANIDINE 4 MG/1
4 TABLET ORAL
Qty: 90 TABLET | Refills: 3 | Status: SHIPPED | OUTPATIENT
Start: 2022-12-11

## 2022-12-12 DIAGNOSIS — M05.79 RHEUMATOID ARTHRITIS INVOLVING MULTIPLE SITES WITH POSITIVE RHEUMATOID FACTOR: ICD-10-CM

## 2022-12-12 RX ORDER — TRAMADOL HYDROCHLORIDE 50 MG/1
50 TABLET ORAL EVERY 6 HOURS PRN
Qty: 270 TABLET | Refills: 1 | Status: SHIPPED | OUTPATIENT
Start: 2022-12-12

## 2022-12-12 NOTE — PROGRESS NOTES
Chief Complaint  Roberto Carlos Pichardo presents to [facility]  History of Present Illness  Patient is here for recheck on multiple medical problems including hypertension, depression, acid reflux, etc.  She has a history of urinary incontinence and was referred to urology, and has had bladder stimulator implanted and doing very well with this.  She says she is no longer having the urinary dribbling and incontinence that she was having, and now only has nocturia x1, which is much better.  Her hypertension has been well controlled.  Her depression has been under excellent control with no thoughts of harming self or others.  Her acid reflux is usually well controlled, but she does occasionally need to take Tums for breakthrough symptoms.  She requires Dexilant to control her GERD, as generic pantoprazole 40 mg daily, omeprazole 40 mg daily, and lansoprazole 30 mg daily were all inadequate to control her GERD symptoms.  Gastroenterology recommended Dexilant lifelong and she is continue taking this.  Patient says that she never did have surgery on her right shoulder after the injury she suffered a couple of years ago, and her range of motion has gradually improved quite a bit, so she is thinking at this time that she will probably not pursue surgery, and she is only limited in pain with 1 specific maneuver.  However, she does still have some difficulty putting on her bra.  However, she does report that since that fall, she now seems to have a fear of falling, with a bit more impairment of her balance, although she has not had any other recurrent falls.  Objective   Vital Signs:   Vitals:    12/09/22 1509 12/09/22 1530   BP: 140/80 132/78   BP Location:  Left arm   Patient Position:  Sitting   Cuff Size:  Adult   Pulse: 61    Resp: 20    SpO2: 94%    Weight: 76.4 kg (168 lb 8 oz)       /78 (BP Location: Left arm, Patient Position: Sitting, Cuff Size: Adult)    Pulse 61   Resp 20   Wt 76.4 kg (168 lb 8 oz)   SpO2 94%   BMI 31.85 kg/m²     Body mass index is 31.85 kg/m².    Review of Systems   Constitutional: Negative for chills, diaphoresis, fever and unexpected weight loss.   HENT: Negative for ear discharge, ear pain, mouth sores, nosebleeds, rhinorrhea, sinus pressure, sore throat, swollen glands, trouble swallowing and voice change.    Eyes: Negative for blurred vision, double vision, pain, redness and visual disturbance.   Respiratory: Negative for cough, shortness of breath and wheezing.    Cardiovascular: Negative for chest pain, palpitations and leg swelling.        PND, orthopnea   Gastrointestinal: Positive for GERD. Negative for abdominal distention, abdominal pain, anal bleeding, blood in stool, constipation, diarrhea, nausea, vomiting and indigestion.        Dysphagia, odynophagia   Endocrine: Negative for polydipsia, polyphagia and polyuria.   Genitourinary: Negative for dysuria, flank pain, frequency, hematuria, urgency and urinary incontinence.   Musculoskeletal: Negative for arthralgias (unusual/atypica), back pain, gait problem, joint swelling, myalgias and neck pain.   Skin: Negative for color change, rash, skin lesions (worrisome/suspicious) and bruise.   Allergic/Immunologic: Negative for food allergies.   Neurological: Negative for dizziness, tremors, seizures, syncope, weakness, light-headedness, numbness, headache and memory problem.   Hematological: Negative for adenopathy. Does not bruise/bleed easily.   Psychiatric/Behavioral: Negative for sleep disturbance, suicidal ideas and depressed mood. The patient is not nervous/anxious.        Past History:  Medical History: has a past medical history of Allergic rhinitis, Carter esophagus (2006), Benign essential hypertension, Breast complaint (2008), Cataract, Class 1 obesity in adult, Diabetes mellitus, type 2 (HCC) (2013), Diabetic peripheral neuropathy associated with type 2 diabetes mellitus  (HCC), Diverticulitis (2005), Diverticulosis of large intestine without hemorrhage (2011), Edema, Fatigue, Fibromyalgia, Gastroesophageal reflux disease without esophagitis, Hammer toe, Herpes simplex, Hiatal hernia with gastroesophageal reflux, High risk medication use, Irritable bowel syndrome, Irritable bowel syndrome with constipation, Left retinal detachment (2010), Microscopic hematuria (1995), Mixed hyperlipidemia, Lizama neuroma, left (2010), Lizama's neuroma of both feet (2007), Lizama's neuroma of left foot (2008), Onychomycosis of left great toe, Plantar fasciitis, 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), Rheumatoid arthritis involving multiple joints (HCC), Tear of meniscus of left knee (2008), Urinary incontinence (2022), and Vitamin D deficiency.   Surgical History: has a past surgical history that includes Lizama's Neuroma Excision (Left, 2008); Cystoscopy; Knee arthroscopy; Tonsillectomy and adenoidectomy; Dilation and curettage of uterus; Bunionectomy (Bilateral); Colonoscopy (2011); and Esophagogastroduodenoscopy (2008).   Family History: family history includes Alzheimer's disease in her mother; Heart failure in her mother; Hyperlipidemia in her mother; Hypertension in her mother.   Social History: reports that she quit smoking about 32 years ago. Her smoking use included cigarettes. She started smoking about 52 years ago. She has a 20.00 pack-year smoking history. She has never used smokeless tobacco. She reports that she does not currently use alcohol. She reports that she does not use drugs.      Current Outpatient Medications:   •  celecoxib (CeleBREX) 100 MG capsule, Take 1 capsule by mouth Daily., Disp: , Rfl:   •  gabapentin (NEURONTIN) 100 MG capsule, Take 2 capsules by mouth Daily., Disp: , Rfl:   •  Humira Pen 40 MG/0.4ML Pen-injector Kit, , Disp: , Rfl:   •  hydroxychloroquine (PLAQUENIL) 200 MG tablet, Take 1 tablet by mouth 2 (Two) Times a Day., Disp: , Rfl:   •   leflunomide (ARAVA) 20 MG tablet, Take 1 tablet by mouth Daily., Disp: , Rfl:   •  traMADol (ULTRAM) 50 MG tablet, Take 1 tablet by mouth Every 6 (Six) Hours As Needed for Moderate Pain ., Disp: 270 tablet, Rfl: 1  •  amLODIPine (NORVASC) 5 MG tablet, Take 1 tablet by mouth Daily., Disp: 90 tablet, Rfl: 3  •  dexlansoprazole (DEXILANT) 60 MG capsule, Take 1 capsule by mouth Daily., Disp: 90 capsule, Rfl: 3  •  DULoxetine (CYMBALTA) 60 MG capsule, Take 1 capsule by mouth Daily., Disp: 90 capsule, Rfl: 3  •  metoprolol succinate XL (TOPROL-XL) 100 MG 24 hr tablet, Take 1 tablet by mouth Daily., Disp: 90 tablet, Rfl: 3  •  pravastatin (PRAVACHOL) 20 MG tablet, Take 1 tablet by mouth Daily., Disp: 90 tablet, Rfl: 3  •  tiZANidine (ZANAFLEX) 4 MG tablet, Take 1 tablet by mouth every night at bedtime., Disp: 90 tablet, Rfl: 3  •  valsartan-hydrochlorothiazide (DIOVAN-HCT) 320-25 MG per tablet, Take 1 tablet by mouth Daily., Disp: 90 tablet, Rfl: 3    Allergies: Myrbetriq [mirabegron], Erythromycin, Meloxicam, and Metronidazole    Physical Exam  Constitutional:       General: She is not in acute distress.     Appearance: She is obese. She is not toxic-appearing.   HENT:      Head: Normocephalic and atraumatic.      Right Ear: Ear canal and external ear normal.      Left Ear: Ear canal and external ear normal.      Nose: Nose normal.      Mouth/Throat:      Mouth: Mucous membranes are moist.      Pharynx: Oropharynx is clear.   Eyes:      General: No scleral icterus.     Extraocular Movements: Extraocular movements intact.      Conjunctiva/sclera: Conjunctivae normal.      Pupils: Pupils are equal, round, and reactive to light.   Neck:      Vascular: No carotid bruit.   Cardiovascular:      Rate and Rhythm: Normal rate and regular rhythm.      Pulses: Normal pulses.      Heart sounds: Normal heart sounds.   Pulmonary:      Effort: Pulmonary effort is normal.      Breath sounds: Normal breath sounds.   Chest:      Chest  wall: No tenderness.   Abdominal:      General: Bowel sounds are normal. There is no distension.      Palpations: Abdomen is soft. There is no mass.      Tenderness: There is no abdominal tenderness. There is no guarding or rebound.   Musculoskeletal:         General: No swelling, tenderness or deformity. Normal range of motion.      Cervical back: Normal range of motion. No rigidity.      Right lower leg: No edema.      Left lower leg: No edema.   Lymphadenopathy:      Cervical: No cervical adenopathy.   Skin:     General: Skin is warm and dry.      Capillary Refill: Capillary refill takes less than 2 seconds.      Coloration: Skin is not pale.      Findings: No erythema or rash.   Neurological:      General: No focal deficit present.      Mental Status: She is alert and oriented to person, place, and time.      Cranial Nerves: No cranial nerve deficit.      Motor: No weakness.      Coordination: Coordination normal.      Gait: Gait normal.   Psychiatric:         Mood and Affect: Mood normal.         Behavior: Behavior normal.         Thought Content: Thought content normal.         Judgment: Judgment normal.                   Assessment and Plan   Diagnoses and all orders for this visit:    1. Essential hypertension, benign (Primary)  Patient will continue current medications.  She is not due for labs at this time, as everything was checked 6 months ago and was stable.  Therefore we will see her back in 6 months and do labs at that time.  2. Carter's esophagus without dysplasia  As stated above, the patient has history of severe GERD with Carter's esophagus and failed to respond to a couple of months of each omeprazole 40 mg daily, pantoprazole 40 mg daily, and lansoprazole 30 mg daily.  Therefore, she requires Dexilant 60 mg daily lifelong according to gastroenterology.  3. Need for influenza vaccination  -     Fluzone High-Dose 65+yrs    4. Encounter for long-term (current) use of other medications  -     POC  Urine Drug Screen, Triage    5. Prediabetes  Patient's old records have documented that she had type 2 diabetes, but this was a mistake.  The patient was on prednisone for rheumatoid arthritis for a while before starting Humira and Plaquenil, and during that time her glucose was barely in the diabetes range.  When she came off the prednisone her glucose has dropped into the normal of prediabetes range.  Therefore she still is in the prediabetes category and stable  6. Mixed hyperlipidemia    7. Recurrent major depressive disorder, in partial remission (HCC)  Patient's mood is good on current medications, which is largely also being used to treat her chronic pain and neuropathy symptoms, but does help her history of depression is well, and she has not had relapse in many years  8. Fibromyalgia    9. Rheumatoid arthritis involving multiple sites with positive rheumatoid factor (HCC)  Patient is under the care of rheumatology for this and is well controlled with Humira and Plaquenil  10. Gastroesophageal reflux disease without esophagitis    11. Irritable bowel syndrome with diarrhea    12. Idiopathic peripheral neuropathy    13. Spinal stenosis of lumbar region without neurogenic claudication    14. OAB (overactive bladder)            Follow Up   Return in about 6 months (around 6/9/2023) for Recheck.  Patient was given instructions and counseling regarding her condition or for health maintenance advice. Please see specific information pulled into the AVS if appropriate.     Donny Falcon MD  Answers for HPI/ROS submitted by the patient on 12/9/2022  Please describe your symptoms.: none  Have you had these symptoms before?: No  How long have you been having these symptoms?: 1-4 days  Please list any medications you are currently taking for this condition.: none  What is the primary reason for your visit?: Other

## 2022-12-12 NOTE — TELEPHONE ENCOUNTER
I forgot to ask the patient at her appointment last week with that she wanted her tramadol refill sent to [facility] or to [id number] mail order.  Please call her and let me know if she says.  Thanks

## 2023-03-08 ENCOUNTER — OFFICE VISIT (OUTPATIENT)
Dept: UROLOGY | Facility: CLINIC | Age: 78
End: 2023-03-08
Payer: MEDICARE

## 2023-03-08 VITALS — HEIGHT: 61 IN | HEART RATE: 66 BPM | OXYGEN SATURATION: 96 % | BODY MASS INDEX: 31.72 KG/M2 | WEIGHT: 168 LBS

## 2023-03-08 DIAGNOSIS — N39.41 URGE INCONTINENCE: Primary | ICD-10-CM

## 2023-03-08 DIAGNOSIS — N32.81 OAB (OVERACTIVE BLADDER): ICD-10-CM

## 2023-03-08 PROCEDURE — 99213 OFFICE O/P EST LOW 20 MIN: CPT | Performed by: NURSE PRACTITIONER

## 2023-03-08 NOTE — PROGRESS NOTES
Follow Up Office Visit      Patient Name: Mady Pichardo  : 1945   MRN: [id number]     Chief Complaint:    Chief Complaint   Patient presents with   • 3 month follow up        Referring Provider: No ref. provider found    History of Present Illness: Mady Pichardo is a 77 y.o. female who presents today for follow up after undergoing sacral neurostimulator placement on 2022 with Dr. Sterling. She has been doing well post-operatively. She reports her urinary urgency and frequency has greatly improved. She denies any leaking of urine during the day. She reports urinary urgency when she first wakes up and she usually leaks urine at that time, however she reports this has improved over the last couple of days. She reports nocturia x1. She is wearing one pad per day for fecal incontinence. She reports leaking stool twice daily prior to surgery. She is overall very pleased with her symptom improvement. She is currently on program 2 at level 3.. She reports stimulation on left side of rectum.    She reports right lower extremity pain that originates from lumbar spine that has been present prior to sacral neurostimulator implant.    Subjective      Review of System: Review of Systems   Gastrointestinal:        Fecal incontinence.   Genitourinary: Positive for urgency. Negative for decreased urine volume, difficulty urinating, dysuria, enuresis, flank pain, frequency and hematuria.      I have reviewed the ROS documented by my clinical staff, updated as appropriate and I agree. DUSTY Mckeon    I have reviewed and the following portions of the patient's history were updated as appropriate: past family history, past medical history, past social history, past surgical history and problem list.    Medications:     Current Outpatient Medications:   •  amLODIPine (NORVASC) 5 MG tablet, Take 1 tablet by mouth Daily., Disp: 90 tablet, Rfl: 3  •  celecoxib (CeleBREX) 100 MG capsule, Take 1 capsule by mouth Daily.,  "Disp: , Rfl:   •  dexlansoprazole (DEXILANT) 60 MG capsule, Take 1 capsule by mouth Daily., Disp: 90 capsule, Rfl: 3  •  DULoxetine (CYMBALTA) 60 MG capsule, Take 1 capsule by mouth Daily., Disp: 90 capsule, Rfl: 3  •  gabapentin (NEURONTIN) 100 MG capsule, Take 2 capsules by mouth Daily., Disp: , Rfl:   •  Humira Pen 40 MG/0.4ML Pen-injector Kit, , Disp: , Rfl:   •  hydroxychloroquine (PLAQUENIL) 200 MG tablet, Take 1 tablet by mouth 2 (Two) Times a Day., Disp: , Rfl:   •  leflunomide (ARAVA) 20 MG tablet, Take 1 tablet by mouth Daily., Disp: , Rfl:   •  metoprolol succinate XL (TOPROL-XL) 100 MG 24 hr tablet, Take 1 tablet by mouth Daily., Disp: 90 tablet, Rfl: 3  •  pravastatin (PRAVACHOL) 20 MG tablet, Take 1 tablet by mouth Daily., Disp: 90 tablet, Rfl: 3  •  tiZANidine (ZANAFLEX) 4 MG tablet, Take 1 tablet by mouth every night at bedtime., Disp: 90 tablet, Rfl: 3  •  traMADol (ULTRAM) 50 MG tablet, Take 1 tablet by mouth Every 6 (Six) Hours As Needed for Moderate Pain., Disp: 270 tablet, Rfl: 1  •  valsartan-hydrochlorothiazide (DIOVAN-HCT) 320-25 MG per tablet, Take 1 tablet by mouth Daily., Disp: 90 tablet, Rfl: 3    Allergies:   Allergies   Allergen Reactions   • Myrbetriq [Mirabegron] Nausea And Vomiting   • Erythromycin GI Intolerance   • Meloxicam GI Bleeding   • Metronidazole GI Intolerance     Objective     Physical Exam:   Vital Signs:   Vitals:    03/08/23 0956   Pulse: 66   SpO2: 96%   Weight: 76.2 kg (168 lb)   Height: 154.9 cm (60.98\")   PainSc: 0-No pain     Body mass index is 31.76 kg/m².     Physical Exam  Vitals and nursing note reviewed.   Constitutional:       Appearance: Normal appearance.   HENT:      Head: Normocephalic and atraumatic.      Nose: Nose normal.      Mouth/Throat:      Mouth: Mucous membranes are moist.   Eyes:      Pupils: Pupils are equal, round, and reactive to light.   Pulmonary:      Effort: Pulmonary effort is normal.   Abdominal:      General: Abdomen is flat.      " Palpations: Abdomen is soft.   Musculoskeletal:         General: Normal range of motion.      Cervical back: Normal range of motion.   Skin:     General: Skin is warm and dry.      Capillary Refill: Capillary refill takes less than 2 seconds.   Neurological:      General: No focal deficit present.      Mental Status: She is alert.   Psychiatric:         Mood and Affect: Mood normal.         Labs:   Brief Urine Lab Results     None               Lab Results   Component Value Date    GLUCOSE 94 06/02/2022    CALCIUM 10.3 06/02/2022     06/02/2022    K 4.5 06/02/2022    CO2 25 06/02/2022     06/02/2022    BUN 24 06/02/2022    CREATININE 0.72 06/02/2022    BCR 33 (H) 06/02/2022       Lab Results   Component Value Date    WBC 6.6 06/02/2022    HGB 14.0 06/02/2022    HCT 41.1 06/02/2022    MCV 91 06/02/2022     06/02/2022       Images:   No Images in the past 120 days found..    Measures:   Tobacco:   Mady Pichardo  reports that she quit smoking about 33 years ago. Her smoking use included cigarettes. She started smoking about 53 years ago. She has a 20.00 pack-year smoking history. She has never been exposed to tobacco smoke. She has never used smokeless tobacco.       Urine Incontinence: Patient reports that she has been experiencing symptoms of urinary incontinence in the morning.  Assessment / Plan      Assessment/Plan:   77 y.o. female is seen today for follow up after undergoing sacral neurostimulator implant with Dr. Sterling. Overall, she has done well post-operatively. Overall, she is pleased with her symptom improvement. We discussed possibly increasing stimulation given continued urinary urgency in the morning. She is currently happy with her symptoms and her settings. She will follow up in 6 months to assess urinary symptoms.     Diagnoses and all orders for this visit:    1. Urge incontinence (Primary)    2. OAB (overactive bladder)       Follow Up:   Return in about 6 months (around 9/8/2023)  for Dr. Sterling.    I spent approximately 35 minutes providing clinical care for this patient; including review of patient's chart and provider documentation, face to face time spent with patient in examination room (obtaining history, performing physical exam, discussing diagnosis and management options), placing orders, and completing patient documentation.     DUSTY Mckeon  [facility]

## 2023-03-31 ENCOUNTER — OFFICE VISIT (OUTPATIENT)
Dept: FAMILY MEDICINE CLINIC | Facility: CLINIC | Age: 78
End: 2023-03-31
Payer: MEDICARE

## 2023-03-31 VITALS
OXYGEN SATURATION: 100 % | BODY MASS INDEX: 32.75 KG/M2 | SYSTOLIC BLOOD PRESSURE: 118 MMHG | WEIGHT: 173.44 LBS | DIASTOLIC BLOOD PRESSURE: 78 MMHG | HEIGHT: 61 IN | HEART RATE: 65 BPM | TEMPERATURE: 98.8 F | RESPIRATION RATE: 17 BRPM

## 2023-03-31 DIAGNOSIS — M46.1 SACROILIITIS: ICD-10-CM

## 2023-03-31 DIAGNOSIS — M54.16 LUMBAR RADICULITIS: Primary | ICD-10-CM

## 2023-03-31 DIAGNOSIS — M43.16 SPONDYLOLISTHESIS AT L1-L2 LEVEL: ICD-10-CM

## 2023-03-31 DIAGNOSIS — M48.061 SPINAL STENOSIS OF LUMBAR REGION WITHOUT NEUROGENIC CLAUDICATION: ICD-10-CM

## 2023-03-31 PROCEDURE — 3074F SYST BP LT 130 MM HG: CPT | Performed by: FAMILY MEDICINE

## 2023-03-31 PROCEDURE — 99213 OFFICE O/P EST LOW 20 MIN: CPT | Performed by: FAMILY MEDICINE

## 2023-03-31 PROCEDURE — 3078F DIAST BP <80 MM HG: CPT | Performed by: FAMILY MEDICINE

## 2023-03-31 RX ORDER — ASPIRIN 81 MG/1
81 TABLET, CHEWABLE ORAL DAILY
COMMUNITY

## 2023-03-31 RX ORDER — METHOCARBAMOL 500 MG/1
500 TABLET, FILM COATED ORAL 3 TIMES DAILY
Qty: 90 TABLET | Refills: 0 | COMMUNITY
Start: 2023-03-16 | End: 2023-04-15

## 2023-03-31 RX ORDER — CONJUGATED ESTROGENS 0.62 MG/G
CREAM VAGINAL
COMMUNITY

## 2023-03-31 NOTE — PROGRESS NOTES
"Chief Complaint  Back Pain (Low back pain more so on the right. The radiates down her leg. She did have a back injectio a few days ago.)    Subjective      Mady Pichardo presents to [facility]  History of Present Illness  Patient has been having lower back pain with sciatica for over a year and an MRI was done February 2022 that showed significant degenerative changes with spondylolisthesis and S2 nerve root compression.  She has been going to [facility] orth[facility]ics and getting epidural steroid injections, but she only gets relief for about a month with those.  Is getting harder to get in and out of the car.  In addition, she is having a lot of pain in her right buttock area that makes it very difficult and thinks she has sacroiliac dysfunction.  She has been referred to a new spine surgeon at [facility] but just wanted to discuss this with me as well  Objective   Vital Signs:   Vitals:    03/31/23 0934   BP: 118/78   BP Location: Left arm   Patient Position: Sitting   Cuff Size: Adult   Pulse: 65   Resp: 17   Temp: 98.8 °F (37.1 °C)   TempSrc: Oral   SpO2: 100%   Weight: 78.7 kg (173 lb 7 oz)   Height: 154.9 cm (60.98\")      /78 (BP Location: Left arm, Patient Position: Sitting, Cuff Size: Adult)   Pulse 65   Temp 98.8 °F (37.1 °C) (Oral)   Resp 17   Ht 154.9 cm (60.98\")   Wt 78.7 kg (173 lb 7 oz)   SpO2 100%   BMI 32.79 kg/m²     Body mass index is 32.79 kg/m².    Review of Systems    Past History:  Medical History: has a past medical history of Allergic rhinitis, Carter esophagus (2006), Benign essential hypertension, Breast complaint (2008), Cataract, Class 1 obesity in adult, Diabetes mellitus, type 2 (HCC) (2013), Diabetic peripheral neuropathy associated with type 2 diabetes mellitus (HCC), Diverticulitis (2005), Diverticulosis of large intestine without hemorrhage (2011), Edema, Fatigue, Fibromyalgia, Gastroesophageal reflux disease without esophagitis, " Hammer toe, Herpes simplex, Hiatal hernia with gastroesophageal reflux, High risk medication use, Irritable bowel syndrome, Irritable bowel syndrome with constipation, Left retinal detachment (2010), Microscopic hematuria (1995), Mixed hyperlipidemia, Lizama neuroma, left (2010), Lizama's neuroma of both feet (2007), Lizama's neuroma of left foot (2008), Onychomycosis of left great toe, Plantar fasciitis, 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), Rheumatoid arthritis involving multiple joints (HCC), Tear of meniscus of left knee (2008), Urinary incontinence (2022), and Vitamin D deficiency.   Surgical History: has a past surgical history that includes Lizama's Neuroma Excision (Left, 2008); Cystoscopy; Knee arthroscopy; Tonsillectomy and adenoidectomy; Dilation and curettage of uterus; Bunionectomy (Bilateral); Colonoscopy (2011); and Esophagogastroduodenoscopy (2008).   Family History: family history includes Alzheimer's disease in her mother; Heart failure in her mother; Hyperlipidemia in her mother; Hypertension in her mother.   Social History: reports that she quit smoking about 33 years ago. Her smoking use included cigarettes. She started smoking about 53 years ago. She has a 20.00 pack-year smoking history. She has never been exposed to tobacco smoke. She has never used smokeless tobacco. She reports that she does not currently use alcohol. She reports that she does not use drugs.      Current Outpatient Medications:   •  amLODIPine (NORVASC) 5 MG tablet, Take 1 tablet by mouth Daily., Disp: 90 tablet, Rfl: 3  •  aspirin 81 MG chewable tablet, Chew 1 tablet Daily., Disp: , Rfl:   •  celecoxib (CeleBREX) 100 MG capsule, Take 1 capsule by mouth Daily., Disp: , Rfl:   •  dexlansoprazole (DEXILANT) 60 MG capsule, Take 1 capsule by mouth Daily., Disp: 90 capsule, Rfl: 3  •  DULoxetine (CYMBALTA) 60 MG capsule, Take 1 capsule by mouth Daily., Disp: 90 capsule, Rfl: 3  •  Estrogens Conjugated (Premarin)  0.625 MG/GM vaginal cream, Premarin 0.625 mg/gram vaginal cream, Disp: , Rfl:   •  gabapentin (NEURONTIN) 100 MG capsule, Take 2 capsules by mouth Daily., Disp: , Rfl:   •  Humira Pen 40 MG/0.4ML Pen-injector Kit, , Disp: , Rfl:   •  hydroxychloroquine (PLAQUENIL) 200 MG tablet, Take 1 tablet by mouth 2 (Two) Times a Day., Disp: , Rfl:   •  leflunomide (ARAVA) 20 MG tablet, Take 1 tablet by mouth Daily., Disp: , Rfl:   •  methocarbamol (ROBAXIN) 500 MG tablet, Take 1 tablet by mouth 3 (Three) Times a Day., Disp: 90 tablet, Rfl: 0  •  metoprolol succinate XL (TOPROL-XL) 100 MG 24 hr tablet, Take 1 tablet by mouth Daily., Disp: 90 tablet, Rfl: 3  •  pravastatin (PRAVACHOL) 20 MG tablet, Take 1 tablet by mouth Daily., Disp: 90 tablet, Rfl: 3  •  tiZANidine (ZANAFLEX) 4 MG tablet, Take 1 tablet by mouth every night at bedtime., Disp: 90 tablet, Rfl: 3  •  traMADol (ULTRAM) 50 MG tablet, Take 1 tablet by mouth Every 6 (Six) Hours As Needed for Moderate Pain., Disp: 270 tablet, Rfl: 1  •  valsartan-hydrochlorothiazide (DIOVAN-HCT) 320-25 MG per tablet, Take 1 tablet by mouth Daily., Disp: 90 tablet, Rfl: 3    Allergies: Myrbetriq [mirabegron], Erythromycin, Meloxicam, Metronidazole, and Cerave [albolene]    Physical Exam  Constitutional:       General: She is not in acute distress.     Appearance: She is obese. She is not toxic-appearing.   HENT:      Head: Normocephalic.      Right Ear: External ear normal.      Left Ear: External ear normal.      Nose: Nose normal.      Mouth/Throat:      Mouth: Mucous membranes are moist.      Pharynx: Oropharynx is clear.   Eyes:      Extraocular Movements: Extraocular movements intact.      Conjunctiva/sclera: Conjunctivae normal.      Pupils: Pupils are equal, round, and reactive to light.   Musculoskeletal:      Lumbar back: Spasms and tenderness present. Decreased range of motion.      Comments: Positive KRISTINE test right SI joint   Skin:     General: Skin is warm and dry.       Capillary Refill: Capillary refill takes less than 2 seconds.      Findings: No erythema or rash.   Neurological:      General: No focal deficit present.      Mental Status: She is alert. Mental status is at baseline.      Cranial Nerves: No cranial nerve deficit.      Motor: No weakness.   Psychiatric:         Mood and Affect: Mood normal.         Thought Content: Thought content normal.                   Assessment and Plan   Diagnoses and all orders for this visit:    1. Lumbar radiculitis (Primary)  Patient did just get an epidural steroid injection yesterday.  However, these are not lasting more than a few weeks and she is ready to look into other treatment options but wanted to get a couple names of specialist whom I have some experience referring to.  She also does appear to have right sacroiliac issues and I told her that she should discuss this with whomever is doing her epidural steroid injections at [facility] as they can do a sacroiliac joint injection, although she may need something more definitive done in the long-term.  I wrote down the names of some specialist that we have had good patient care with over the years, the told her that [facility] overall has always done a good job with her patients, and she will just have to decide for herself if she wants to follow their recommendations.  If she wishes to see someone else she will let me know  2. Spinal stenosis of lumbar region without neurogenic claudication    3. Spondylolisthesis at L1-L2 level    4. Sacroiliitis (HCC)  Management discussed.  Since he just got an epidural steroid injection yesterday, I am reluctant to add any additional treatment right now as I do not want to continue the picture.  She does have Celebrex and Robaxin that she can take as needed          Follow Up   No follow-ups on file.  Patient was given instructions and counseling regarding her condition or for health maintenance advice. Please see specific  information pulled into the AVS if appropriate.     Donny Falcon MD

## 2023-06-16 ENCOUNTER — OFFICE VISIT (OUTPATIENT)
Dept: FAMILY MEDICINE CLINIC | Facility: CLINIC | Age: 78
End: 2023-06-16
Payer: MEDICARE

## 2023-06-16 VITALS
HEIGHT: 61 IN | WEIGHT: 167.3 LBS | DIASTOLIC BLOOD PRESSURE: 68 MMHG | OXYGEN SATURATION: 98 % | BODY MASS INDEX: 31.59 KG/M2 | SYSTOLIC BLOOD PRESSURE: 122 MMHG | HEART RATE: 63 BPM

## 2023-06-16 DIAGNOSIS — M43.16 SPONDYLOLISTHESIS AT L1-L2 LEVEL: ICD-10-CM

## 2023-06-16 DIAGNOSIS — E55.9 VITAMIN D DEFICIENCY: ICD-10-CM

## 2023-06-16 DIAGNOSIS — N32.81 OAB (OVERACTIVE BLADDER): ICD-10-CM

## 2023-06-16 DIAGNOSIS — I10 ESSENTIAL HYPERTENSION, BENIGN: ICD-10-CM

## 2023-06-16 DIAGNOSIS — E78.2 MIXED HYPERLIPIDEMIA: ICD-10-CM

## 2023-06-16 DIAGNOSIS — K21.9 GASTROESOPHAGEAL REFLUX DISEASE WITHOUT ESOPHAGITIS: ICD-10-CM

## 2023-06-16 DIAGNOSIS — R73.03 PREDIABETES: Primary | ICD-10-CM

## 2023-06-16 DIAGNOSIS — M05.79 RHEUMATOID ARTHRITIS INVOLVING MULTIPLE SITES WITH POSITIVE RHEUMATOID FACTOR: ICD-10-CM

## 2023-06-16 DIAGNOSIS — F33.41 RECURRENT MAJOR DEPRESSIVE DISORDER, IN PARTIAL REMISSION: ICD-10-CM

## 2023-06-16 DIAGNOSIS — M79.7 FIBROMYALGIA: ICD-10-CM

## 2023-06-16 DIAGNOSIS — E53.8 B12 DEFICIENCY: ICD-10-CM

## 2023-06-16 DIAGNOSIS — G60.9 IDIOPATHIC PERIPHERAL NEUROPATHY: ICD-10-CM

## 2023-06-16 DIAGNOSIS — K58.0 IRRITABLE BOWEL SYNDROME WITH DIARRHEA: ICD-10-CM

## 2023-06-16 DIAGNOSIS — R53.83 OTHER FATIGUE: ICD-10-CM

## 2023-06-16 DIAGNOSIS — K22.70 BARRETT'S ESOPHAGUS WITHOUT DYSPLASIA: ICD-10-CM

## 2023-06-16 DIAGNOSIS — Z79.899 ENCOUNTER FOR LONG-TERM (CURRENT) USE OF OTHER MEDICATIONS: ICD-10-CM

## 2023-06-16 DIAGNOSIS — Z11.59 ENCOUNTER FOR HEPATITIS C SCREENING TEST FOR LOW RISK PATIENT: ICD-10-CM

## 2023-06-16 DIAGNOSIS — M48.061 SPINAL STENOSIS OF LUMBAR REGION WITHOUT NEUROGENIC CLAUDICATION: ICD-10-CM

## 2023-06-16 RX ORDER — TRAMADOL HYDROCHLORIDE 50 MG/1
50 TABLET ORAL EVERY 6 HOURS PRN
Qty: 360 TABLET | Refills: 1 | Status: SHIPPED | OUTPATIENT
Start: 2023-06-16

## 2023-06-17 LAB
25(OH)D3+25(OH)D2 SERPL-MCNC: 69.3 NG/ML (ref 30–100)
ALBUMIN SERPL-MCNC: 4.3 G/DL (ref 3.7–4.7)
ALBUMIN/GLOB SERPL: 2 {RATIO} (ref 1.2–2.2)
ALP SERPL-CCNC: 95 IU/L (ref 44–121)
ALT SERPL-CCNC: 24 IU/L (ref 0–32)
AST SERPL-CCNC: 32 IU/L (ref 0–40)
BASOPHILS # BLD AUTO: 0 X10E3/UL (ref 0–0.2)
BASOPHILS NFR BLD AUTO: 1 %
BILIRUB SERPL-MCNC: 0.5 MG/DL (ref 0–1.2)
BUN SERPL-MCNC: 18 MG/DL (ref 8–27)
BUN/CREAT SERPL: 27 (ref 12–28)
CALCIUM SERPL-MCNC: 9.6 MG/DL (ref 8.7–10.3)
CHLORIDE SERPL-SCNC: 102 MMOL/L (ref 96–106)
CHOLEST SERPL-MCNC: 164 MG/DL (ref 100–199)
CO2 SERPL-SCNC: 21 MMOL/L (ref 20–29)
CREAT SERPL-MCNC: 0.66 MG/DL (ref 0.57–1)
EGFRCR SERPLBLD CKD-EPI 2021: 91 ML/MIN/1.73
EOSINOPHIL # BLD AUTO: 0.1 X10E3/UL (ref 0–0.4)
EOSINOPHIL NFR BLD AUTO: 2 %
ERYTHROCYTE [DISTWIDTH] IN BLOOD BY AUTOMATED COUNT: 12.9 % (ref 11.7–15.4)
FOLATE SERPL-MCNC: >20 NG/ML
GLOBULIN SER CALC-MCNC: 2.2 G/DL (ref 1.5–4.5)
GLUCOSE SERPL-MCNC: 105 MG/DL (ref 70–99)
HBA1C MFR BLD: 5.6 % (ref 4.8–5.6)
HCT VFR BLD AUTO: 41.2 % (ref 34–46.6)
HCV IGG SERPL QL IA: NON REACTIVE
HDLC SERPL-MCNC: 51 MG/DL
HGB BLD-MCNC: 13.9 G/DL (ref 11.1–15.9)
IMM GRANULOCYTES # BLD AUTO: 0 X10E3/UL (ref 0–0.1)
IMM GRANULOCYTES NFR BLD AUTO: 0 %
LDLC SERPL CALC-MCNC: 89 MG/DL (ref 0–99)
LYMPHOCYTES # BLD AUTO: 0.9 X10E3/UL (ref 0.7–3.1)
LYMPHOCYTES NFR BLD AUTO: 17 %
MAGNESIUM SERPL-MCNC: 2 MG/DL (ref 1.6–2.3)
MCH RBC QN AUTO: 29.8 PG (ref 26.6–33)
MCHC RBC AUTO-ENTMCNC: 33.7 G/DL (ref 31.5–35.7)
MCV RBC AUTO: 88 FL (ref 79–97)
MONOCYTES # BLD AUTO: 0.6 X10E3/UL (ref 0.1–0.9)
MONOCYTES NFR BLD AUTO: 12 %
NEUTROPHILS # BLD AUTO: 3.5 X10E3/UL (ref 1.4–7)
NEUTROPHILS NFR BLD AUTO: 68 %
PLATELET # BLD AUTO: 250 X10E3/UL (ref 150–450)
POTASSIUM SERPL-SCNC: 3.6 MMOL/L (ref 3.5–5.2)
PROT SERPL-MCNC: 6.5 G/DL (ref 6–8.5)
RBC # BLD AUTO: 4.66 X10E6/UL (ref 3.77–5.28)
SODIUM SERPL-SCNC: 141 MMOL/L (ref 134–144)
T4 FREE SERPL-MCNC: 1.03 NG/DL (ref 0.82–1.77)
TRIGL SERPL-MCNC: 137 MG/DL (ref 0–149)
TSH SERPL DL<=0.005 MIU/L-ACNC: 0.92 UIU/ML (ref 0.45–4.5)
VIT B12 SERPL-MCNC: 1305 PG/ML (ref 232–1245)
VLDLC SERPL CALC-MCNC: 24 MG/DL (ref 5–40)
WBC # BLD AUTO: 5.2 X10E3/UL (ref 3.4–10.8)

## 2023-06-18 NOTE — PROGRESS NOTES
"Chief Complaint  Back Pain, Hypertension, and Depression    Subjective      Mady Pichardo presents to [facility]  History of Present Illness  Patient is here for 6-month checkup on hypertension, prediabetes, chronic back pain and joint pain which requires tramadol for management, and various other chronic issues.  She says overall things have been very stable lately, although she did have symptoms of gastroenteritis earlier this week, but it is resolving and she is feeling much better now.  Has not had any blood in her stool or melena.  She did have some headache and malaise but that is resolved.  She also says that the pelvic mesh that she had implanted a long time ago has eroded into the vagina so she is having intermittent vaginal bleeding, but she is being monitored very carefully by her gynecologist and she is hoping that they will not have to do more extensive surgery, but if she continues having bleeding problems that is more likely what will be needed.    Her blood pressures been fine.  She has rheumatoid arthritis and most of her joint pain is adequately controlled with rheumatology drugs, but she also has chronic lower back pain from degenerative disc disease, spondylolisthesis, and spondylosis, so she does require tramadol.  She has been very reliable with this and her urine drug screens and Moe reports been fine.  Her depression is doing great.  Her GERD is generally well controlled, although she does have breakthrough symptoms about weekly but no swallowing problems or melena.  Objective   Vital Signs:   Vitals:    06/16/23 1349   BP: 122/68   BP Location: Left arm   Patient Position: Sitting   Cuff Size: Large Adult   Pulse: 63   SpO2: 98%   Weight: 75.9 kg (167 lb 4.8 oz)   Height: 153.7 cm (60.5\")      /68 (BP Location: Left arm, Patient Position: Sitting, Cuff Size: Large Adult)   Pulse 63   Ht 153.7 cm (60.5\")   Wt 75.9 kg (167 lb 4.8 oz)   SpO2 98%   BMI " 32.14 kg/m²     Body mass index is 32.14 kg/m².    Review of Systems   Constitutional:  Negative for activity change, appetite change, chills and fever.   HENT:  Negative for sore throat, swollen glands, trouble swallowing and voice change.    Eyes:  Negative for visual disturbance.   Respiratory:  Negative for cough, choking, chest tightness, shortness of breath and wheezing.    Cardiovascular:  Negative for chest pain, palpitations and leg swelling.   Gastrointestinal:  Positive for GERD. Negative for abdominal pain, blood in stool, constipation, diarrhea and nausea.   Endocrine: Negative for polydipsia, polyphagia and polyuria.   Genitourinary:  Positive for vaginal bleeding. Negative for dysuria and hematuria.   Musculoskeletal:  Positive for arthralgias and back pain. Negative for gait problem, joint swelling and myalgias.   Skin:  Negative for color change and rash.   Neurological:  Negative for dizziness, tremors, syncope, speech difficulty, weakness, light-headedness, headache and memory problem.   Hematological:  Negative for adenopathy.   Psychiatric/Behavioral:  Negative for suicidal ideas and depressed mood. The patient is not nervous/anxious.      Past History:  Medical History: has a past medical history of Allergic rhinitis, Carter esophagus (2006), Benign essential hypertension, Breast complaint (2008), Cataract, Class 1 obesity in adult, Diabetes mellitus, type 2 (2013), Diabetic peripheral neuropathy associated with type 2 diabetes mellitus, Diverticulitis (2005), Diverticulosis of large intestine without hemorrhage (2011), Edema, Fatigue, Fibromyalgia, Gastroesophageal reflux disease without esophagitis, Hammer toe, Herpes simplex, Hiatal hernia with gastroesophageal reflux, High risk medication use, Irritable bowel syndrome, Irritable bowel syndrome with constipation, Left retinal detachment (2010), Microscopic hematuria (1995), Mixed hyperlipidemia, Lizama neuroma, left (2010), Lizama's neuroma  of both feet (2007), Lizama's neuroma of left foot (2008), Onychomycosis of left great toe, Plantar fasciitis, Recurrent major depression in partial remission, Rheumatoid arthritis involving multiple joints, Tear of meniscus of left knee (2008), Urinary incontinence (2022), and Vitamin D deficiency.   Surgical History: has a past surgical history that includes Lizama's Neuroma Excision (Left, 2008); Cystoscopy; Knee arthroscopy; Tonsillectomy and adenoidectomy; Dilation and curettage of uterus; Bunionectomy (Bilateral); Colonoscopy (2011); and Esophagogastroduodenoscopy (2008).   Family History: family history includes Alzheimer's disease in her mother; Heart failure in her mother; Hyperlipidemia in her mother; Hypertension in her mother.   Social History: reports that she quit smoking about 33 years ago. Her smoking use included cigarettes. She started smoking about 53 years ago. She has a 20.00 pack-year smoking history. She has never been exposed to tobacco smoke. She has never used smokeless tobacco. She reports that she does not currently use alcohol. She reports that she does not use drugs.      Current Outpatient Medications:     amLODIPine (NORVASC) 5 MG tablet, Take 1 tablet by mouth Daily., Disp: 90 tablet, Rfl: 3    aspirin 81 MG chewable tablet, Chew 1 tablet Daily., Disp: , Rfl:     celecoxib (CeleBREX) 100 MG capsule, Take 1 capsule by mouth Daily., Disp: , Rfl:     dexlansoprazole (DEXILANT) 60 MG capsule, Take 1 capsule by mouth Daily., Disp: 90 capsule, Rfl: 3    DULoxetine (CYMBALTA) 60 MG capsule, Take 1 capsule by mouth Daily., Disp: 90 capsule, Rfl: 3    Estrogens Conjugated (Premarin) 0.625 MG/GM vaginal cream, Premarin 0.625 mg/gram vaginal cream, Disp: , Rfl:     gabapentin (NEURONTIN) 100 MG capsule, Take 2 capsules by mouth Daily., Disp: , Rfl:     Humira Pen 40 MG/0.4ML Pen-injector Kit, , Disp: , Rfl:     hydroxychloroquine (PLAQUENIL) 200 MG tablet, Take 1 tablet by mouth 2 (Two) Times a  Day., Disp: , Rfl:     leflunomide (ARAVA) 20 MG tablet, Take 1 tablet by mouth Daily., Disp: , Rfl:     metoprolol succinate XL (TOPROL-XL) 100 MG 24 hr tablet, Take 1 tablet by mouth Daily., Disp: 90 tablet, Rfl: 3    pravastatin (PRAVACHOL) 20 MG tablet, Take 1 tablet by mouth Daily., Disp: 90 tablet, Rfl: 3    tiZANidine (ZANAFLEX) 4 MG tablet, Take 1 tablet by mouth every night at bedtime., Disp: 90 tablet, Rfl: 3    traMADol (ULTRAM) 50 MG tablet, Take 1 tablet by mouth Every 6 (Six) Hours As Needed for Moderate Pain., Disp: 360 tablet, Rfl: 1    valsartan-hydrochlorothiazide (DIOVAN-HCT) 320-25 MG per tablet, Take 1 tablet by mouth Daily., Disp: 90 tablet, Rfl: 3    Allergies: Myrbetriq [mirabegron], Erythromycin, Meloxicam, Metronidazole, and Cerave [albolene]    Physical Exam  Constitutional:       General: She is not in acute distress.     Appearance: She is obese. She is not toxic-appearing.   HENT:      Head: Normocephalic and atraumatic.      Right Ear: Ear canal and external ear normal.      Left Ear: Ear canal and external ear normal.      Nose: Nose normal.      Mouth/Throat:      Mouth: Mucous membranes are moist.      Pharynx: Oropharynx is clear.   Eyes:      General: No scleral icterus.     Extraocular Movements: Extraocular movements intact.      Conjunctiva/sclera: Conjunctivae normal.      Pupils: Pupils are equal, round, and reactive to light.   Neck:      Vascular: No carotid bruit.   Cardiovascular:      Rate and Rhythm: Normal rate and regular rhythm.      Pulses: Normal pulses.      Heart sounds: Normal heart sounds.   Pulmonary:      Effort: Pulmonary effort is normal.      Breath sounds: Normal breath sounds.   Chest:      Chest wall: No tenderness.   Abdominal:      General: Bowel sounds are normal. There is no distension.      Palpations: Abdomen is soft.      Tenderness: There is no abdominal tenderness. There is no guarding or rebound.   Musculoskeletal:         General: Tenderness  (Mild tenderness and spasm of the paraspinous muscles in the lumbosacral spine area) and deformity (Mild RA findings in hands) present. No swelling. Normal range of motion.      Cervical back: Normal range of motion. No rigidity.      Right lower leg: No edema.      Left lower leg: No edema.   Lymphadenopathy:      Cervical: No cervical adenopathy.   Skin:     General: Skin is warm and dry.      Capillary Refill: Capillary refill takes less than 2 seconds.   Neurological:      General: No focal deficit present.      Mental Status: She is alert and oriented to person, place, and time.      Cranial Nerves: No cranial nerve deficit.      Motor: No weakness.      Coordination: Coordination normal.      Gait: Gait normal.   Psychiatric:         Mood and Affect: Mood normal.         Behavior: Behavior normal.         Thought Content: Thought content normal.         Judgment: Judgment normal.                 Assessment and Plan   Diagnoses and all orders for this visit:    1. Prediabetes (Primary)  -     Hemoglobin A1c  Patient overall appears medically stable, so we will continue current medications and check labs.  She will return in 6 months for annual physical or sooner if needed  2. Essential hypertension, benign    3. Mixed hyperlipidemia  -     Lipid Panel    4. Encounter for long-term (current) use of other medications  -     CBC & Differential  -     Comprehensive Metabolic Panel  -     Magnesium  -     POC Urine Drug Screen [facility]    5. Recurrent major depressive disorder, in partial remission  Depression is doing very well with Cymbalta 60 mg daily and she will continue on this  6. Fibromyalgia  -     POC Urine Drug Screen [facility]  This was diagnosed shortly before she found out she had rheumatoid arthritis, so the diagnosis has continued to be in question, but we have been reluctant to completely remove it from her list and she does have some myofascial pain, although that certainly could be  from her autoimmune disease.  Nonetheless, she is doing well with her current regimen  7. Rheumatoid arthritis involving multiple sites with positive rheumatoid factor  -     traMADol (ULTRAM) 50 MG tablet; Take 1 tablet by mouth Every 6 (Six) Hours As Needed for Moderate Pain.  Dispense: 360 tablet; Refill: 1  -     POC Urine Drug Screen [facility]  This is managed by rheumatology and she is doing well with Humira and Plaquenil, dose per rheumatologist, and also on Arava  8. Gastroesophageal reflux disease without esophagitis  Generally well controlled but the patient does have breakthrough symptoms even with Dexilant 60 mg daily, although this has controlled it much better than any other medicine she has tried.  She has tried generic omeprazole 40 mg twice a day, pantoprazole 40 mg twice a day, and Prevacid 30 mg twice a day but none of those would control her symptoms.  Since she has Carter's esophagus, her gastroenterologist has recommended she stay on Dexilant indefinitely  9. Irritable bowel syndrome with diarrhea  This has been better lately  10. Carter's esophagus without dysplasia  See #8 above for details, she will follow-up with GI when due for next endoscopy  11. Idiopathic peripheral neuropathy  Neuropathy symptoms are well controlled with the Cymbalta 60 mg daily and the gabapentin 100 mg twice a day, which is being prescribed by her rheumatologist, and she has no evidence of any skin breakdown or sores  12. Spinal stenosis of lumbar region without neurogenic claudication  -     POC Urine Drug Screen [facility]    13. OAB (overactive bladder)  The patient continues to follow-up with her gynecologist about this and is using estrogen cream, but there is anatomic issues because of mesh breakthrough, and will continue to follow-up with gynecology about this  14. Spondylolisthesis at L1-L2 level    15. Other fatigue  -     TSH+Free T4    16. B12 deficiency  -     Vitamin B12  -     Folate    17.  Vitamin D deficiency  -     Vitamin D,25-Hydroxy    18. Encounter for hepatitis C screening test for low risk patient  -     Hepatitis C Antibody            Follow Up   Return in about 6 months (around 12/16/2023) for Recheck, Provider - AWV Initial.  Patient was given instructions and counseling regarding her condition or for health maintenance advice. Please see specific information pulled into the AVS if appropriate.     Donny Falcon MD

## 2023-09-26 ENCOUNTER — OFFICE VISIT (OUTPATIENT)
Dept: UROLOGY | Facility: CLINIC | Age: 78
End: 2023-09-26
Payer: MEDICARE

## 2023-09-26 VITALS — HEART RATE: 65 BPM | OXYGEN SATURATION: 93 % | WEIGHT: 164 LBS | HEIGHT: 61 IN | BODY MASS INDEX: 30.96 KG/M2

## 2023-09-26 DIAGNOSIS — R39.9 LOWER URINARY TRACT SYMPTOMS (LUTS): ICD-10-CM

## 2023-09-26 DIAGNOSIS — N39.41 URGE INCONTINENCE: Primary | ICD-10-CM

## 2023-09-26 DIAGNOSIS — N32.81 OAB (OVERACTIVE BLADDER): ICD-10-CM

## 2023-09-26 DIAGNOSIS — R39.15 URGENCY OF URINATION: ICD-10-CM

## 2023-09-26 PROCEDURE — 1159F MED LIST DOCD IN RCRD: CPT | Performed by: UROLOGY

## 2023-09-26 PROCEDURE — 1160F RVW MEDS BY RX/DR IN RCRD: CPT | Performed by: UROLOGY

## 2023-09-26 PROCEDURE — 99214 OFFICE O/P EST MOD 30 MIN: CPT | Performed by: UROLOGY

## 2023-09-26 RX ORDER — MELOXICAM 7.5 MG/1
TABLET ORAL
COMMUNITY

## 2023-09-26 RX ORDER — PREDNISONE 20 MG/1
TABLET ORAL
COMMUNITY

## 2023-09-26 RX ORDER — ETANERCEPT 50 MG/ML
50 SOLUTION SUBCUTANEOUS
COMMUNITY
Start: 2023-09-14

## 2023-09-26 NOTE — PROGRESS NOTES
Follow Up Office Visit      Patient Name: Mady Pichardo  : 1945   MRN: [id number]     Chief Complaint:    Chief Complaint   Patient presents with    [employer] follow up         History of Present Illness: Mady Pichardo is a 78 y.o. female who presents today for 6-month follow-up after placement of sacral nerve stimulator for lower urinary tract symptoms/OAB related symptoms.  She is doing well in the postoperative setting with reduction in symptoms.    Subjective      Review of System: Review of Systems   Genitourinary:  Negative for decreased urine volume, difficulty urinating, dysuria, enuresis, flank pain, frequency, hematuria and urgency.    I have reviewed the ROS documented by my clinical staff, updated as appropriate and I agree. Jacob Sterling MD    I have reviewed and the following portions of the patient's history were updated as appropriate: past family history, past medical history, past social history, past surgical history and problem list.    Medications:     Current Outpatient Medications:     amLODIPine (NORVASC) 5 MG tablet, Take 1 tablet by mouth Daily., Disp: 90 tablet, Rfl: 3    aspirin 81 MG chewable tablet, Chew 1 tablet Daily., Disp: , Rfl:     celecoxib (CeleBREX) 100 MG capsule, Take 1 capsule by mouth Daily., Disp: , Rfl:     dexlansoprazole (DEXILANT) 60 MG capsule, Take 1 capsule by mouth Daily., Disp: 90 capsule, Rfl: 3    DULoxetine (CYMBALTA) 60 MG capsule, Take 1 capsule by mouth Daily., Disp: 90 capsule, Rfl: 3    Enbrel Mini 50 MG/ML solution cartridge, Inject 50 mg under the skin into the appropriate area as directed Every 7 (Seven) Days., Disp: , Rfl:     Estrogens Conjugated (Premarin) 0.625 MG/GM vaginal cream, Premarin 0.625 mg/gram vaginal cream, Disp: , Rfl:     gabapentin (NEURONTIN) 100 MG capsule, Take 2 capsules by mouth Daily., Disp: , Rfl:     Humira Pen 40 MG/0.4ML Pen-injector Kit, , Disp: , Rfl:     hydroxychloroquine (PLAQUENIL) 200 MG tablet, Take 1  tablet by mouth 2 (Two) Times a Day., Disp: , Rfl:     leflunomide (ARAVA) 20 MG tablet, Take 1 tablet by mouth Daily., Disp: , Rfl:     meloxicam (MOBIC) 7.5 MG tablet, meloxicam 7.5 mg tablet, Disp: , Rfl:     metoprolol succinate XL (TOPROL-XL) 100 MG 24 hr tablet, Take 1 tablet by mouth Daily., Disp: 90 tablet, Rfl: 3    pravastatin (PRAVACHOL) 20 MG tablet, Take 1 tablet by mouth Daily., Disp: 90 tablet, Rfl: 3    predniSONE (DELTASONE) 20 MG tablet, prednisone 20 mg tablet, Disp: , Rfl:     tiZANidine (ZANAFLEX) 4 MG tablet, Take 1 tablet by mouth every night at bedtime., Disp: 90 tablet, Rfl: 3    traMADol (ULTRAM) 50 MG tablet, Take 1 tablet by mouth Every 6 (Six) Hours As Needed for Moderate Pain., Disp: 360 tablet, Rfl: 1    valsartan-hydrochlorothiazide (DIOVAN-HCT) 320-25 MG per tablet, Take 1 tablet by mouth Daily., Disp: 90 tablet, Rfl: 3    ondansetron ODT (ZOFRAN-ODT) 4 MG disintegrating tablet, Place 1 tablet on the tongue Every 8 (Eight) Hours As Needed for Nausea or Vomiting., Disp: 20 tablet, Rfl: 0    Allergies:   Allergies   Allergen Reactions    Myrbetriq [Mirabegron] Nausea And Vomiting    Erythromycin GI Intolerance    Meloxicam GI Bleeding    Metronidazole GI Intolerance    Cerave [Albolene] Rash       Bladder & Bowel Symptom Questionnaire    How often do you usually urinate during the day ?   1 - About every 3-4 hours   2.   How many timed do you urinate at night?   2 - About every 2-3 hours    3.   What is the reason that you usually urinate?   3 - About every 1-2 hours   4.   Once you get the urge to go, how long can you     comfortably delay?   3 - About every 1-2 hours   5.   How often do you get a sudden urge that makes you rush to the bathroom?   4 - At least once an hour    6.   How often does a sudden urge to urinate result in you leaking urine or wetting pads?   0 - No more often than once in 4 hours    7.  In your opinion, how good is your bladder control?   2 - About every 2-3  "hours    8.  Do you have accidental bowel leakage?   no   9.  Do you have difficulty fully emptying your bladder?   no   10.  Do you experience accidental leakage when performing some physical activity such as coughing, sneezing, laughing or exercise?   no   11. Have you tried medications to help improve your symptoms?   yes   12. Would you be interested in learning about a long-lasting option that may help you with your symptoms?   no                                                                             Total Score   15     0-7 (Mild) 8-16 (Moderate) 17-28 (Severe)           Objective     Physical Exam:   Vital Signs:   Vitals:    09/26/23 0909   Pulse: 65   SpO2: 93%   Weight: 74.4 kg (164 lb)   Height: 153.7 cm (60.51\")   PainSc: 0-No pain     Body mass index is 31.49 kg/m².     Physical Exam  Vitals and nursing note reviewed.   Constitutional:       Appearance: Normal appearance.   HENT:      Head: Normocephalic and atraumatic.   Cardiovascular:      Comments: Well perfused  Pulmonary:      Effort: Pulmonary effort is normal.   Abdominal:      General: Abdomen is flat.      Palpations: Abdomen is soft.   Musculoskeletal:         General: Normal range of motion.   Skin:     General: Skin is warm and dry.   Neurological:      General: No focal deficit present.      Mental Status: She is alert and oriented to person, place, and time. Mental status is at baseline.   Psychiatric:         Mood and Affect: Mood normal.         Behavior: Behavior normal.         Thought Content: Thought content normal.         Judgment: Judgment normal.           Labs:   Brief Urine Lab Results       None                 Lab Results   Component Value Date    GLUCOSE 82 09/27/2023    CALCIUM 9.8 09/27/2023     (H) 09/27/2023    K 3.7 09/27/2023    CO2 24 09/27/2023     (H) 09/27/2023    BUN 20 09/27/2023    CREATININE 0.63 09/27/2023    BCR 32 (H) 09/27/2023       Lab Results   Component Value Date    WBC 6.8 09/27/2023 "    HGB 12.8 09/27/2023    HCT 39.0 09/27/2023    MCV 90 09/27/2023     09/27/2023       Images:   No Images in the past 120 days found..    Measures:   Tobacco:   Mady Pichardo  reports that she quit smoking about 33 years ago. Her smoking use included cigarettes. She started smoking about 53 years ago. She has a 20.00 pack-year smoking history. She has never been exposed to tobacco smoke. She has never used smokeless tobacco.. I have educated her on the risk of diseases from using tobacco products.     Assessment / Plan      Assessment/Plan:   78 y.o. female is seen today for spine follow-up after [employer]/sacral nerve stimulator placement for lower urinary tract symptoms/OAB symptoms.  She is doing well in the postoperative setting.  We have encouraged if she sees any change in symptoms to contact [employer] device representative for program related questions.  She will follow-up in 1 year for symptom check, alert with any change in symptoms prior.    Diagnoses and all orders for this visit:    1. Urge incontinence (Primary)    2. Lower urinary tract symptoms (LUTS)    3. Urgency of urination    4. OAB (overactive bladder)         Follow Up:   Return in 1 year (on 9/26/2024) for Recheck.     I spent approximately 30 minutes providing clinical care for this patient; including review of patient's chart and provider documentation, face to face time spent with patient in examination room (obtaining history, performing physical exam, discussing diagnosis and management options), placing orders, and completing patient documentation.     Jacob Sterling MD  [facility]

## 2023-09-27 ENCOUNTER — OFFICE VISIT (OUTPATIENT)
Dept: FAMILY MEDICINE CLINIC | Facility: CLINIC | Age: 78
End: 2023-09-27
Payer: MEDICARE

## 2023-09-27 VITALS
BODY MASS INDEX: 30.63 KG/M2 | HEIGHT: 60 IN | DIASTOLIC BLOOD PRESSURE: 98 MMHG | OXYGEN SATURATION: 97 % | HEART RATE: 95 BPM | WEIGHT: 156 LBS | SYSTOLIC BLOOD PRESSURE: 166 MMHG

## 2023-09-27 DIAGNOSIS — R10.84 GENERALIZED ABDOMINAL PAIN: Primary | ICD-10-CM

## 2023-09-27 PROCEDURE — 3077F SYST BP >= 140 MM HG: CPT | Performed by: FAMILY MEDICINE

## 2023-09-27 PROCEDURE — 3080F DIAST BP >= 90 MM HG: CPT | Performed by: FAMILY MEDICINE

## 2023-09-27 PROCEDURE — 99214 OFFICE O/P EST MOD 30 MIN: CPT | Performed by: FAMILY MEDICINE

## 2023-09-27 RX ORDER — ONDANSETRON 4 MG/1
4 TABLET, ORALLY DISINTEGRATING ORAL EVERY 8 HOURS PRN
Qty: 20 TABLET | Refills: 0 | Status: SHIPPED | OUTPATIENT
Start: 2023-09-27

## 2023-09-28 LAB
ALBUMIN SERPL-MCNC: 4 G/DL (ref 3.8–4.8)
ALBUMIN/GLOB SERPL: 2 {RATIO} (ref 1.2–2.2)
ALP SERPL-CCNC: 57 IU/L (ref 44–121)
ALT SERPL-CCNC: 21 IU/L (ref 0–32)
AMYLASE SERPL-CCNC: 59 U/L (ref 31–110)
AST SERPL-CCNC: 28 IU/L (ref 0–40)
BASOPHILS # BLD AUTO: 0 X10E3/UL (ref 0–0.2)
BASOPHILS NFR BLD AUTO: 1 %
BILIRUB SERPL-MCNC: 0.5 MG/DL (ref 0–1.2)
BUN SERPL-MCNC: 20 MG/DL (ref 8–27)
BUN/CREAT SERPL: 32 (ref 12–28)
CALCIUM SERPL-MCNC: 9.8 MG/DL (ref 8.7–10.3)
CHLORIDE SERPL-SCNC: 108 MMOL/L (ref 96–106)
CO2 SERPL-SCNC: 24 MMOL/L (ref 20–29)
CREAT SERPL-MCNC: 0.63 MG/DL (ref 0.57–1)
EGFRCR SERPLBLD CKD-EPI 2021: 92 ML/MIN/1.73
EOSINOPHIL # BLD AUTO: 0.1 X10E3/UL (ref 0–0.4)
EOSINOPHIL NFR BLD AUTO: 2 %
ERYTHROCYTE [DISTWIDTH] IN BLOOD BY AUTOMATED COUNT: 12.8 % (ref 11.7–15.4)
GLOBULIN SER CALC-MCNC: 2 G/DL (ref 1.5–4.5)
GLUCOSE SERPL-MCNC: 82 MG/DL (ref 70–99)
HCT VFR BLD AUTO: 39 % (ref 34–46.6)
HGB BLD-MCNC: 12.8 G/DL (ref 11.1–15.9)
IMM GRANULOCYTES # BLD AUTO: 0 X10E3/UL (ref 0–0.1)
IMM GRANULOCYTES NFR BLD AUTO: 0 %
LIPASE SERPL-CCNC: 18 U/L (ref 14–85)
LYMPHOCYTES # BLD AUTO: 1.7 X10E3/UL (ref 0.7–3.1)
LYMPHOCYTES NFR BLD AUTO: 25 %
MCH RBC QN AUTO: 29.6 PG (ref 26.6–33)
MCHC RBC AUTO-ENTMCNC: 32.8 G/DL (ref 31.5–35.7)
MCV RBC AUTO: 90 FL (ref 79–97)
MONOCYTES # BLD AUTO: 1.1 X10E3/UL (ref 0.1–0.9)
MONOCYTES NFR BLD AUTO: 16 %
NEUTROPHILS # BLD AUTO: 3.8 X10E3/UL (ref 1.4–7)
NEUTROPHILS NFR BLD AUTO: 56 %
PLATELET # BLD AUTO: 247 X10E3/UL (ref 150–450)
POTASSIUM SERPL-SCNC: 3.7 MMOL/L (ref 3.5–5.2)
PROT SERPL-MCNC: 6 G/DL (ref 6–8.5)
RBC # BLD AUTO: 4.32 X10E6/UL (ref 3.77–5.28)
SODIUM SERPL-SCNC: 146 MMOL/L (ref 134–144)
WBC # BLD AUTO: 6.8 X10E3/UL (ref 3.4–10.8)

## 2023-09-28 NOTE — PROGRESS NOTES
"     Follow Up Office Visit      Date of Visit:  2023   Patient Name: Mady Pichardo  : 1945   MRN: [id number]     Chief Complaint:    Chief Complaint   Patient presents with    RECURRENT VOMITING       History of Present Illness: Mady Pichardo is a 78 y.o. female who is here today for follow up.  Patient has for the last month had some recurrent episodes of vomiting.  Also with nausea at times.  No diarrhea or constipation.  She does have a long history of acid reflux.  She has had EGDs in the past.  She has had her gallbladder removed.  Some central abdominal tenderness sick at times.  No loss of weight.  Still taking acid reflux medication.        Subjective      Review of Systems:   Review of Systems   Constitutional:  Negative for fatigue and fever.   HENT:  Negative for congestion and ear pain.    Respiratory:  Negative for apnea, cough, chest tightness and shortness of breath.    Cardiovascular:  Negative for chest pain.   Gastrointestinal:  Positive for abdominal pain, nausea and vomiting. Negative for constipation and diarrhea.   Musculoskeletal:  Negative for arthralgias.   Psychiatric/Behavioral:  Negative for depressed mood and stress.      Past Medical History:   Past Medical History:   Diagnosis Date    Allergic rhinitis     Carter esophagus 2006    EGD WITH BIOPSY      ,, -, 2011    Benign essential hypertension     Breast complaint     \"DIMPLE\" DX MMG AND ULTRASOUND, ALL NORMAL    Cataract     NOT IN NEED OF SURGERY YET    Class 1 obesity in adult     Diabetes mellitus, type 2     Diabetic peripheral neuropathy associated with type 2 diabetes mellitus     Diverticulitis     IV ATBX - IMPROVED    Diverticulosis of large intestine without hemorrhage 2011    Edema     WITH VENOUS INSUFFICIENCY    Fatigue     Fibromyalgia     Gastroesophageal reflux disease without esophagitis     Hammer toe     Herpes simplex     GENITAL  - CONTROLLED W/ CHRONIC FAMVIR    Hiatal " hernia with gastroesophageal reflux     High risk medication use     Irritable bowel syndrome     Irritable bowel syndrome with constipation     Left retinal detachment 2010    LASER TREATMENT, SUCCESSFUL    Microscopic hematuria     IVP/CYSTOSCOPY    Mixed hyperlipidemia     Lizama neuroma, left 2010    Lizama's neuroma of both feet     INJECTION, FAILED, SURGERY PLANNED    Lizama's neuroma of left foot     NEUROMA SURGICALLY REMOVED, LEFT FOOT    Onychomycosis of left great toe     Plantar fasciitis     Recurrent major depression in partial remission     Rheumatoid arthritis involving multiple joints     Tear of meniscus of left knee     Urinary incontinence     Bladder stimulator implanted and doing well    Vitamin D deficiency        Past Surgical History:   Past Surgical History:   Procedure Laterality Date    BUNIONECTOMY Bilateral     COLONOSCOPY  2011    CYSTOSCOPY      DILATATION AND CURETTAGE      ENDOSCOPY  2008    WITH BIOPSY ( ,, , 2011)    KNEE ARTHROSCOPY      LIZAMA'S NEUROMA EXCISION Left     ALSO 2010    TONSILLECTOMY AND ADENOIDECTOMY         Family History:   Family History   Problem Relation Age of Onset    Hyperlipidemia Mother     Hypertension Mother     Heart failure Mother     Alzheimer's disease Mother        Social History:   Social History     Socioeconomic History    Marital status:    Tobacco Use    Smoking status: Former     Packs/day: 1.00     Years: 20.00     Pack years: 20.00     Types: Cigarettes     Start date:      Quit date:      Years since quittin.7     Passive exposure: Never    Smokeless tobacco: Never   Vaping Use    Vaping Use: Never used   Substance and Sexual Activity    Alcohol use: Not Currently    Drug use: Never    Sexual activity: Defer       Medications:     Current Outpatient Medications:     amLODIPine (NORVASC) 5 MG tablet, Take 1 tablet by mouth Daily., Disp: 90 tablet, Rfl: 3    aspirin 81 MG  chewable tablet, Chew 1 tablet Daily., Disp: , Rfl:     celecoxib (CeleBREX) 100 MG capsule, Take 1 capsule by mouth Daily., Disp: , Rfl:     dexlansoprazole (DEXILANT) 60 MG capsule, Take 1 capsule by mouth Daily., Disp: 90 capsule, Rfl: 3    DULoxetine (CYMBALTA) 60 MG capsule, Take 1 capsule by mouth Daily., Disp: 90 capsule, Rfl: 3    Enbrel Mini 50 MG/ML solution cartridge, Inject 50 mg under the skin into the appropriate area as directed Every 7 (Seven) Days., Disp: , Rfl:     Estrogens Conjugated (Premarin) 0.625 MG/GM vaginal cream, Premarin 0.625 mg/gram vaginal cream, Disp: , Rfl:     gabapentin (NEURONTIN) 100 MG capsule, Take 2 capsules by mouth Daily., Disp: , Rfl:     Humira Pen 40 MG/0.4ML Pen-injector Kit, , Disp: , Rfl:     hydroxychloroquine (PLAQUENIL) 200 MG tablet, Take 1 tablet by mouth 2 (Two) Times a Day., Disp: , Rfl:     leflunomide (ARAVA) 20 MG tablet, Take 1 tablet by mouth Daily., Disp: , Rfl:     meloxicam (MOBIC) 7.5 MG tablet, meloxicam 7.5 mg tablet, Disp: , Rfl:     metoprolol succinate XL (TOPROL-XL) 100 MG 24 hr tablet, Take 1 tablet by mouth Daily., Disp: 90 tablet, Rfl: 3    ondansetron ODT (ZOFRAN-ODT) 4 MG disintegrating tablet, Place 1 tablet on the tongue Every 8 (Eight) Hours As Needed for Nausea or Vomiting., Disp: 20 tablet, Rfl: 0    pravastatin (PRAVACHOL) 20 MG tablet, Take 1 tablet by mouth Daily., Disp: 90 tablet, Rfl: 3    predniSONE (DELTASONE) 20 MG tablet, prednisone 20 mg tablet, Disp: , Rfl:     tiZANidine (ZANAFLEX) 4 MG tablet, Take 1 tablet by mouth every night at bedtime., Disp: 90 tablet, Rfl: 3    traMADol (ULTRAM) 50 MG tablet, Take 1 tablet by mouth Every 6 (Six) Hours As Needed for Moderate Pain., Disp: 360 tablet, Rfl: 1    valsartan-hydrochlorothiazide (DIOVAN-HCT) 320-25 MG per tablet, Take 1 tablet by mouth Daily., Disp: 90 tablet, Rfl: 3    Allergies:   Allergies   Allergen Reactions    Myrbetriq [Mirabegron] Nausea And Vomiting    Erythromycin  "GI Intolerance    Meloxicam GI Bleeding    Metronidazole GI Intolerance    Cerave [Albolene] Rash       Objective     Physical Exam:  Vital Signs:   Vitals:    09/27/23 1057   BP: 166/98   Pulse: 95   SpO2: 97%   Weight: 70.8 kg (156 lb)   Height: 152.4 cm (60\")     Body mass index is 30.47 kg/m².     Physical Exam  Vitals and nursing note reviewed.   Constitutional:       General: She is not in acute distress.     Appearance: Normal appearance. She is not ill-appearing.   HENT:      Head: Normocephalic and atraumatic.      Right Ear: Tympanic membrane and ear canal normal.      Left Ear: Tympanic membrane and ear canal normal.      Nose: Nose normal.   Cardiovascular:      Rate and Rhythm: Normal rate and regular rhythm.      Heart sounds: Normal heart sounds.   Pulmonary:      Effort: Pulmonary effort is normal.      Breath sounds: Normal breath sounds.   Neurological:      Mental Status: She is alert and oriented to person, place, and time. Mental status is at baseline.   Psychiatric:         Mood and Affect: Mood normal.       Procedures      Assessment / Plan      Assessment/Plan:   Diagnoses and all orders for this visit:    1. Generalized abdominal pain (Primary)  -     Cancel: Comprehensive metabolic panel; Future  -     Cancel: CBC w AUTO Differential; Future  -     Cancel: Amylase; Future  -     Cancel: Lipase; Future  -     Amylase  -     CBC w AUTO Differential  -     Comprehensive metabolic panel  -     Lipase    Other orders  -     ondansetron ODT (ZOFRAN-ODT) 4 MG disintegrating tablet; Place 1 tablet on the tongue Every 8 (Eight) Hours As Needed for Nausea or Vomiting.  Dispense: 20 tablet; Refill: 0         We will initially pursue evaluation with labs.  I suspect we will need to pursue at least some imaging.  Will obtain blood work first to determine if I am going to do a CT scan of her abdomen or an ultrasound of her liver.  May also pursue further investigation with gastrointestinal " referral.    Follow Up:   No follow-ups on file.    Jose Alberto Hopkins  [facility]

## 2023-10-02 ENCOUNTER — PATIENT MESSAGE (OUTPATIENT)
Dept: FAMILY MEDICINE CLINIC | Facility: CLINIC | Age: 78
End: 2023-10-02
Payer: MEDICARE

## 2023-10-02 DIAGNOSIS — R10.84 GENERALIZED ABDOMINAL PAIN: Primary | ICD-10-CM

## 2023-10-06 ENCOUNTER — TELEPHONE (OUTPATIENT)
Dept: FAMILY MEDICINE CLINIC | Facility: CLINIC | Age: 78
End: 2023-10-06

## 2023-10-06 NOTE — TELEPHONE ENCOUNTER
HUB TO RELAY   Pt calling about message she sent on 10/2/2023. She is still experiencing stomach pain and nausea. Requesting call back from office on what to do. Please advise.         Left message for pt to make appt go to urgent treatment we will be open on Saturday 9am to 12

## 2023-10-06 NOTE — TELEPHONE ENCOUNTER
Pt calling about message she sent on 10/2/2023. She is still experiencing stomach pain and nausea. Requesting call back from office on what to do. Please advise.

## 2023-10-15 DIAGNOSIS — R11.2 NAUSEA AND VOMITING, UNSPECIFIED VOMITING TYPE: ICD-10-CM

## 2023-10-15 DIAGNOSIS — R10.84 GENERALIZED ABDOMINAL PAIN: ICD-10-CM

## 2023-10-15 DIAGNOSIS — K42.9 UMBILICAL HERNIA WITHOUT OBSTRUCTION AND WITHOUT GANGRENE: Primary | ICD-10-CM

## 2023-12-01 ENCOUNTER — PATIENT ROUNDING (BHMG ONLY) (OUTPATIENT)
Dept: GASTROENTEROLOGY | Facility: CLINIC | Age: 78
End: 2023-12-01
Payer: MEDICARE

## 2023-12-01 ENCOUNTER — OFFICE VISIT (OUTPATIENT)
Dept: GASTROENTEROLOGY | Facility: CLINIC | Age: 78
End: 2023-12-01
Payer: MEDICARE

## 2023-12-01 VITALS
HEIGHT: 60 IN | BODY MASS INDEX: 28.86 KG/M2 | SYSTOLIC BLOOD PRESSURE: 128 MMHG | WEIGHT: 147 LBS | TEMPERATURE: 97.5 F | OXYGEN SATURATION: 97 % | HEART RATE: 65 BPM | DIASTOLIC BLOOD PRESSURE: 76 MMHG

## 2023-12-01 DIAGNOSIS — K44.9 HIATAL HERNIA: ICD-10-CM

## 2023-12-01 DIAGNOSIS — K59.09 CHRONIC CONSTIPATION: ICD-10-CM

## 2023-12-01 DIAGNOSIS — R10.84 GENERALIZED ABDOMINAL PAIN: Primary | ICD-10-CM

## 2023-12-01 DIAGNOSIS — K57.90 DIVERTICULOSIS: ICD-10-CM

## 2023-12-01 DIAGNOSIS — K21.9 GASTROESOPHAGEAL REFLUX DISEASE, UNSPECIFIED WHETHER ESOPHAGITIS PRESENT: ICD-10-CM

## 2023-12-01 DIAGNOSIS — K22.70 BARRETT'S ESOPHAGUS WITHOUT DYSPLASIA: ICD-10-CM

## 2023-12-01 DIAGNOSIS — R11.0 NAUSEA: ICD-10-CM

## 2023-12-01 DIAGNOSIS — K42.9 UMBILICAL HERNIA WITHOUT OBSTRUCTION AND WITHOUT GANGRENE: ICD-10-CM

## 2023-12-01 RX ORDER — ESTRADIOL 0.07 MG/D
FILM, EXTENDED RELEASE TRANSDERMAL
COMMUNITY
End: 2023-12-01

## 2023-12-01 RX ORDER — IBUPROFEN 800 MG/1
TABLET ORAL
COMMUNITY
End: 2023-12-01

## 2023-12-01 RX ORDER — AMMONIUM LACTATE 12 G/100G
CREAM TOPICAL
COMMUNITY

## 2023-12-01 RX ORDER — HYDROCODONE BITARTRATE AND ACETAMINOPHEN 5; 325 MG/1; MG/1
TABLET ORAL
COMMUNITY
End: 2023-12-01

## 2023-12-01 RX ORDER — NITROFURANTOIN 25; 75 MG/1; MG/1
CAPSULE ORAL
COMMUNITY
Start: 2023-11-07 | End: 2023-12-01

## 2023-12-01 NOTE — PROGRESS NOTES
December 1, 2023      My name is Lo     I am  with [facility] [id number]  [facility]  [address] [address] [phone].        I am calling to officially welcoming  you to our practice and ask about your recent visit.    Tell me about your visit with us. What things went well       We're always looking for ways to make our patients' experiences even better. Do you have recommendations on ways we may improve?      Overall were you satisfied with your first visit to our practice?        I appreciate you taking the time to speak with me today. Is there anything else I can do for you?       Thank you, and have a great day.    Lo TERRAZAS

## 2023-12-01 NOTE — PROGRESS NOTES
New Patient Consultation     Patient Name: Mady Pichardo  : 1945   MRN: [id number]     Chief Complaint:    Chief Complaint   Patient presents with    Abdominal Pain     Nausea        History of Present Illness: Mady Pichardo is a 78 y.o. female, PMH includes HTN, T2DM, fibromyalgia, GERD, Carter's esophagus, depression, urinary incontinence s/p bladder stimulator placement, who is here today for a Gastroenterology Consultation for abdominal pain.     Pt notes periumbilical pain that has become more intense over the last few months. She has a known umbilical hernia and plans to undergo surgical repair with Dr. Francis in [place] in early January. She notes that the pain has eased up some over the last few weeks. She denies specific aggravating foods or factors.     Pt has chronic GERD, well controlled with Dexilant 60mg daily. She endorses ongoing nausea a few times per week, usually after breakfast or lunch.    She has IBS, constipation prone. She generally has a small, soft, incomplete BM every 1-2 days. She has been intolerant to stool softeners and osmotic laxatives in the past due to diarrhea. She uses fiber supplement sporadically.     Patient denies associated fever, chills, vomiting, diarrhea, hematemesis, dysphagia, hematochezia, melena, bloating, weight loss or gain, dysuria, jaundice or bruising.    Patient denies personal or FHx of H Pylori, pancreatitis, colitis, Celiac disease, UC, Crohn's disease, colon or gastric cancers. Pt denies EtOH, tobacco, illicit substance or NSAID use. She is s/p KAELYN. PUD on Mobic in the remote past.     CT A/P w/wo contrast 10/2023: No acute process is seen. 1.8cm subumbilical nonspecific subcutaneous fluid collection.     EGD 3/2023 with Dr. Osborn at [facility]. Z-line irregular at 34cm. 4cm hiatal hernia. Esophagela mucosal changes consistent with short-segment Carter's esophagus at GEJ, 1cm in length. Normal esophagus otherwise. Diffuse  "moderate gastritis. Normal duodenum. Recommend repeat EGD in 3 years. Pathology not available at time of dictation.     CSY few years ago with significant diverticulosis. No polyps, no further CSY recommended.     Subjective      Review of Systems:   Review of Systems   Constitutional:  Negative for appetite change, chills, diaphoresis, fatigue, fever, unexpected weight gain and unexpected weight loss.   HENT:  Negative for drooling, facial swelling, mouth sores, rhinorrhea, sore throat, tinnitus, trouble swallowing and voice change.    Eyes: Negative.    Respiratory:  Negative for cough, chest tightness and shortness of breath.    Cardiovascular:  Negative for chest pain.   Gastrointestinal:  Positive for abdominal pain (periumbilical), constipation, nausea and GERD (well controlled). Negative for blood in stool, diarrhea, vomiting and indigestion.   Genitourinary:  Negative for dysuria, flank pain, hematuria and pelvic pain.   Musculoskeletal:  Negative for arthralgias and myalgias.   Skin:  Negative for color change, pallor and rash.   Neurological:  Negative for dizziness, tremors, syncope, weakness and numbness.   Psychiatric/Behavioral:  Negative for hallucinations and sleep disturbance. The patient is not nervous/anxious.    All other systems reviewed and are negative.      Past Medical History:   Past Medical History:   Diagnosis Date    Allergic rhinitis     Carter esophagus 2006    EGD WITH BIOPSY      2008,2006, 4-2009, 11/2011    Benign essential hypertension     Breast complaint 2008    \"DIMPLE\" DX MMG AND ULTRASOUND, ALL NORMAL    Cataract     NOT IN NEED OF SURGERY YET    Class 1 obesity in adult     Diabetes mellitus, type 2 2013    Diabetic peripheral neuropathy associated with type 2 diabetes mellitus     Diverticulitis 2005    IV ATBX - IMPROVED    Diverticulosis of large intestine without hemorrhage 2011    Edema     WITH VENOUS INSUFFICIENCY    Fatigue     Fibromyalgia     Gastroesophageal " reflux disease without esophagitis     Hammer toe     Herpes simplex     GENITAL  - CONTROLLED W/ CHRONIC FAMVIR    Hiatal hernia with gastroesophageal reflux     High risk medication use     Irritable bowel syndrome     Irritable bowel syndrome with constipation     Left retinal detachment 2010    LASER TREATMENT, SUCCESSFUL    Microscopic hematuria     IVP/CYSTOSCOPY    Mixed hyperlipidemia     Lizama neuroma, left     2ND    Lizama's neuroma of both feet     INJECTION, FAILED, SURGERY PLANNED    Lizama's neuroma of left foot 2008    NEUROMA SURGICALLY REMOVED, LEFT FOOT    Onychomycosis of left great toe     Plantar fasciitis     Recurrent major depression in partial remission     Rheumatoid arthritis involving multiple joints     Tear of meniscus of left knee     Urinary incontinence     Bladder stimulator implanted and doing well    Vitamin D deficiency        Past Surgical History:   Past Surgical History:   Procedure Laterality Date    BUNIONECTOMY Bilateral     COLONOSCOPY  2011    CYSTOSCOPY      DILATATION AND CURETTAGE      ENDOSCOPY      WITH BIOPSY ( ,, -, 2011)    HYSTERECTOMY      KNEE ARTHROSCOPY      LIZAMA'S NEUROMA EXCISION Left     ALSO 2010    TONSILLECTOMY AND ADENOIDECTOMY      TUBAL ABDOMINAL LIGATION      UPPER GASTROINTESTINAL ENDOSCOPY         Family History:   Family History   Problem Relation Age of Onset    Hyperlipidemia Mother     Hypertension Mother     Heart failure Mother     Alzheimer's disease Mother     Colon cancer Neg Hx     Colon polyps Neg Hx     Esophageal cancer Neg Hx        Social History:   Social History     Socioeconomic History    Marital status:    Tobacco Use    Smoking status: Former     Packs/day: 1.00     Years: 20.00     Additional pack years: 0.00     Total pack years: 20.00     Types: Cigarettes     Start date:      Quit date:      Years since quittin.9     Passive exposure: Never    Smokeless  tobacco: Never   Vaping Use    Vaping Use: Never used   Substance and Sexual Activity    Alcohol use: Not Currently    Drug use: Never    Sexual activity: Defer       Alcohol/Tobacco History:   Social History     Substance and Sexual Activity   Alcohol Use Not Currently     Social History     Tobacco Use   Smoking Status Former    Packs/day: 1.00    Years: 20.00    Additional pack years: 0.00    Total pack years: 20.00    Types: Cigarettes    Start date:     Quit date:     Years since quittin.9    Passive exposure: Never   Smokeless Tobacco Never       Medications:     Current Outpatient Medications:     amLODIPine (NORVASC) 5 MG tablet, Take 1 tablet by mouth Daily., Disp: 90 tablet, Rfl: 3    ammonium lactate (AMLACTIN) 12 % cream, , Disp: , Rfl:     aspirin 81 MG chewable tablet, Chew 1 tablet Daily., Disp: , Rfl:     dexlansoprazole (DEXILANT) 60 MG capsule, Take 1 capsule by mouth Daily., Disp: 90 capsule, Rfl: 3    Diclofenac Sodium (VOLTAREN) 1 % gel gel, , Disp: , Rfl:     DULoxetine (CYMBALTA) 60 MG capsule, Take 1 capsule by mouth Daily., Disp: 90 capsule, Rfl: 3    Enbrel Mini 50 MG/ML solution cartridge, Inject 50 mg under the skin into the appropriate area as directed Every 7 (Seven) Days., Disp: , Rfl:     Estrogens Conjugated (Premarin) 0.625 MG/GM vaginal cream, Premarin 0.625 mg/gram vaginal cream, Disp: , Rfl:     gabapentin (NEURONTIN) 100 MG capsule, Take 2 capsules by mouth Daily., Disp: , Rfl:     hydroxychloroquine (PLAQUENIL) 200 MG tablet, Take 1 tablet by mouth 2 (Two) Times a Day., Disp: , Rfl:     leflunomide (ARAVA) 20 MG tablet, Take 1 tablet by mouth Daily., Disp: , Rfl:     metoprolol succinate XL (TOPROL-XL) 100 MG 24 hr tablet, Take 1 tablet by mouth Daily., Disp: 90 tablet, Rfl: 3    ondansetron ODT (ZOFRAN-ODT) 4 MG disintegrating tablet, Place 1 tablet on the tongue Every 8 (Eight) Hours As Needed for Nausea or Vomiting., Disp: 20 tablet, Rfl: 0    pravastatin  "(PRAVACHOL) 20 MG tablet, Take 1 tablet by mouth Daily., Disp: 90 tablet, Rfl: 3    tiZANidine (ZANAFLEX) 4 MG tablet, Take 1 tablet by mouth every night at bedtime., Disp: 90 tablet, Rfl: 3    traMADol (ULTRAM) 50 MG tablet, Take 1 tablet by mouth Every 6 (Six) Hours As Needed for Moderate Pain., Disp: 360 tablet, Rfl: 1    valsartan-hydrochlorothiazide (DIOVAN-HCT) 320-25 MG per tablet, Take 1 tablet by mouth Daily., Disp: 90 tablet, Rfl: 3    Allergies:   Allergies   Allergen Reactions    Myrbetriq [Mirabegron] Nausea And Vomiting    Erythromycin GI Intolerance    Meloxicam GI Bleeding    Metronidazole GI Intolerance    Cerave [Albolene] Rash       Objective     Physical Exam:  Vital Signs:   Vitals:    12/01/23 0900   BP: 128/76   BP Location: Right arm   Patient Position: Sitting   Cuff Size: Adult   Pulse: 65   Temp: 97.5 °F (36.4 °C)   TempSrc: Temporal   SpO2: 97%   Weight: 66.7 kg (147 lb)   Height: 152.4 cm (60\")     Body mass index is 28.71 kg/m².     Physical Exam  Vitals and nursing note reviewed.   Constitutional:       Appearance: Normal appearance. She is normal weight. She is not ill-appearing or diaphoretic.      Comments: Pleasantly conversant. BMI 28.71.    HENT:      Head: Normocephalic and atraumatic.      Right Ear: External ear normal.      Left Ear: External ear normal.      Nose: Nose normal.      Mouth/Throat:      Mouth: Mucous membranes are moist.      Pharynx: Oropharynx is clear.   Eyes:      Conjunctiva/sclera: Conjunctivae normal.      Pupils: Pupils are equal, round, and reactive to light.   Neck:      Thyroid: No thyromegaly.   Cardiovascular:      Rate and Rhythm: Normal rate and regular rhythm.      Pulses: Normal pulses.      Heart sounds: Normal heart sounds.   Pulmonary:      Effort: Pulmonary effort is normal.      Breath sounds: Normal breath sounds.   Abdominal:      General: Abdomen is flat. Bowel sounds are normal. There is no distension.      Tenderness: There is " abdominal tenderness (periumbilical).      Hernia: A hernia (umbilical) is present.   Musculoskeletal:         General: Normal range of motion.      Cervical back: Normal range of motion and neck supple.   Skin:     General: Skin is warm and dry.   Neurological:      General: No focal deficit present.      Mental Status: She is oriented to person, place, and time.   Psychiatric:         Mood and Affect: Mood normal.         Assessment / Plan      Assessment/Plan:   There are no diagnoses linked to this encounter.     Periumbilical abdominal pain  GERD  Hiatal hernia  Nausea  Carter's esophagus, dysplasia unspecified  Umbilical hernia  Chronic constipation   Diverticulosis   - continue all medications as prescribed   - recommend continued adequate oral hydration, start soluble fiber supplement daily   - pt given GERD diet instructions, advised to avoid GI irritants such as caffeine, carbonation, EtOH, tobacco, chocolate, peppermint, acid-based foods   - previous imaging, endoscopy reports reviewed, will request pathology from most recent EGD   - follow up for umbilical hernia repair as scheduled   - follow up in clinic in 3mo, or after completion of above studies   - call clinic at any time for questions or new / worsened sx    Follow Up:   Return in about 3 months (around 3/1/2024).    Plan of care reviewed with the patient at the conclusion of today's visit.  Education was provided regarding diagnosis, management, and any prescribed or recommended OTC medications.  Patient verbalized understanding of and agreement with management plan.     NOTE TO PATIENT: The 21st Century Cures Act makes medical notes like these available to patients in the interest of transparency. However, be advised this is a medical document. It is intended as peer to peer communication. It is written in medical language and may contain abbreviations or verbiage that are unfamiliar. It may appear blunt or direct. Medical documents are  intended to carry relevant information, facts as evident, and the clinical opinion of the practitioner.     Time Statement:   Discussed plan of care in detail with patient today. Patient verbally understands and agrees. I have spent 45 minutes reviewing available diagnostics, obtaining history, examining the patient, developing a treatment plan, and educating the patient on disease process and plan of care.    Nita Riley PA-C   [facility]

## 2023-12-21 ENCOUNTER — TELEPHONE (OUTPATIENT)
Dept: FAMILY MEDICINE CLINIC | Facility: CLINIC | Age: 78
End: 2023-12-21

## 2023-12-21 ENCOUNTER — OFFICE VISIT (OUTPATIENT)
Dept: FAMILY MEDICINE CLINIC | Facility: CLINIC | Age: 78
End: 2023-12-21
Payer: MEDICARE

## 2023-12-21 VITALS
WEIGHT: 148.8 LBS | SYSTOLIC BLOOD PRESSURE: 136 MMHG | DIASTOLIC BLOOD PRESSURE: 78 MMHG | BODY MASS INDEX: 29.21 KG/M2 | HEIGHT: 60 IN | OXYGEN SATURATION: 97 % | HEART RATE: 55 BPM

## 2023-12-21 DIAGNOSIS — M05.79 RHEUMATOID ARTHRITIS INVOLVING MULTIPLE SITES WITH POSITIVE RHEUMATOID FACTOR: ICD-10-CM

## 2023-12-21 DIAGNOSIS — M43.16 SPONDYLOLISTHESIS AT L1-L2 LEVEL: ICD-10-CM

## 2023-12-21 DIAGNOSIS — Z13.820 SCREENING FOR OSTEOPOROSIS: ICD-10-CM

## 2023-12-21 DIAGNOSIS — N95.9 MENOPAUSAL PROBLEM: ICD-10-CM

## 2023-12-21 DIAGNOSIS — K22.70 BARRETT'S ESOPHAGUS WITHOUT DYSPLASIA: ICD-10-CM

## 2023-12-21 DIAGNOSIS — R29.6 FREQUENT FALLS: ICD-10-CM

## 2023-12-21 DIAGNOSIS — Z79.899 ENCOUNTER FOR LONG-TERM (CURRENT) USE OF OTHER MEDICATIONS: ICD-10-CM

## 2023-12-21 DIAGNOSIS — R73.03 PREDIABETES: ICD-10-CM

## 2023-12-21 DIAGNOSIS — F33.41 RECURRENT MAJOR DEPRESSIVE DISORDER, IN PARTIAL REMISSION: ICD-10-CM

## 2023-12-21 DIAGNOSIS — K21.9 GASTROESOPHAGEAL REFLUX DISEASE WITHOUT ESOPHAGITIS: ICD-10-CM

## 2023-12-21 DIAGNOSIS — N39.41 URGE INCONTINENCE: ICD-10-CM

## 2023-12-21 DIAGNOSIS — E78.2 MIXED HYPERLIPIDEMIA: ICD-10-CM

## 2023-12-21 DIAGNOSIS — R26.89 IMPAIRMENT OF BALANCE: Primary | ICD-10-CM

## 2023-12-21 DIAGNOSIS — M48.061 SPINAL STENOSIS OF LUMBAR REGION WITHOUT NEUROGENIC CLAUDICATION: ICD-10-CM

## 2023-12-21 DIAGNOSIS — M79.7 FIBROMYALGIA: ICD-10-CM

## 2023-12-21 DIAGNOSIS — N32.81 OAB (OVERACTIVE BLADDER): ICD-10-CM

## 2023-12-21 DIAGNOSIS — K58.0 IRRITABLE BOWEL SYNDROME WITH DIARRHEA: ICD-10-CM

## 2023-12-21 DIAGNOSIS — G60.9 IDIOPATHIC PERIPHERAL NEUROPATHY: ICD-10-CM

## 2023-12-21 DIAGNOSIS — I10 ESSENTIAL HYPERTENSION, BENIGN: ICD-10-CM

## 2023-12-21 PROCEDURE — 3078F DIAST BP <80 MM HG: CPT | Performed by: FAMILY MEDICINE

## 2023-12-21 PROCEDURE — 1159F MED LIST DOCD IN RCRD: CPT | Performed by: FAMILY MEDICINE

## 2023-12-21 PROCEDURE — 1160F RVW MEDS BY RX/DR IN RCRD: CPT | Performed by: FAMILY MEDICINE

## 2023-12-21 PROCEDURE — 99214 OFFICE O/P EST MOD 30 MIN: CPT | Performed by: FAMILY MEDICINE

## 2023-12-21 PROCEDURE — 3075F SYST BP GE 130 - 139MM HG: CPT | Performed by: FAMILY MEDICINE

## 2023-12-21 RX ORDER — DEXLANSOPRAZOLE 60 MG/1
1 CAPSULE, DELAYED RELEASE ORAL DAILY
Qty: 90 CAPSULE | Refills: 3 | Status: SHIPPED | OUTPATIENT
Start: 2023-12-21

## 2023-12-21 RX ORDER — METOPROLOL SUCCINATE 100 MG/1
100 TABLET, EXTENDED RELEASE ORAL DAILY
Qty: 90 TABLET | Refills: 3 | Status: SHIPPED | OUTPATIENT
Start: 2023-12-21

## 2023-12-21 RX ORDER — VALSARTAN AND HYDROCHLOROTHIAZIDE 320; 25 MG/1; MG/1
1 TABLET, FILM COATED ORAL DAILY
Qty: 90 TABLET | Refills: 3 | Status: SHIPPED | OUTPATIENT
Start: 2023-12-21

## 2023-12-21 RX ORDER — AMLODIPINE BESYLATE 5 MG/1
5 TABLET ORAL DAILY
Qty: 90 TABLET | Refills: 3 | Status: SHIPPED | OUTPATIENT
Start: 2023-12-21

## 2023-12-21 RX ORDER — TIZANIDINE 4 MG/1
4 TABLET ORAL
Qty: 90 TABLET | Refills: 3 | Status: SHIPPED | OUTPATIENT
Start: 2023-12-21

## 2023-12-21 RX ORDER — DULOXETIN HYDROCHLORIDE 60 MG/1
60 CAPSULE, DELAYED RELEASE ORAL DAILY
Qty: 90 CAPSULE | Refills: 3 | Status: SHIPPED | OUTPATIENT
Start: 2023-12-21

## 2023-12-21 RX ORDER — PRAVASTATIN SODIUM 20 MG
20 TABLET ORAL DAILY
Qty: 90 TABLET | Refills: 3 | Status: SHIPPED | OUTPATIENT
Start: 2023-12-21

## 2023-12-21 RX ORDER — TRAMADOL HYDROCHLORIDE 50 MG/1
50 TABLET ORAL EVERY 6 HOURS PRN
Qty: 360 TABLET | Refills: 1 | Status: SHIPPED | OUTPATIENT
Start: 2023-12-21

## 2023-12-22 NOTE — PROGRESS NOTES
Chief Complaint  Hypertension (Med check up)    Subjective      Mady Pichardo presents to [facility]  History of Present Illness  Patient is here for 6-month checkup on hypertension and several other medical problems.  She said that most of her problems are stable, and overall she is felt fairly well over the last 6 months, but she has had recurrent episodes of protracted vomiting over the last few months.  She had an umbilical hernia that was causing symptoms, so she was referred to a general surgeon and gastroenterology.  Gastroenterologist believe that the symptoms were due to the hernia, so she will be having surgery on January 3 to repair this, and then if her recurrent episodes of vomiting return, GI will decide about doing other testing.  The vomiting has not been every day, and she still eats regularly otherwise, with no significant changes in her bowel habits, no blood in stool or melena.  However, this is gone on long enough that she has lost about 20 pounds.    Other than that, the patient does complain that her balance is getting worse.  In fact, she has had several falls over the last couple of months, but fortunately no serious injuries.  She says the balance seems worse when she makes a quick adjustment in her gait, or makes a quick movement, as that often triggers a loss of balance and a fall.  She denies any syncope, presyncope, or significant chest pain or heart palpitations.  She denies any other neurologic symptoms such as blurred vision incoordination of an arm or leg difficulty speaking or swallowing, etc.  Patient does state that she moved into a new home earlier this year and it requires her to go up and down stairs several times a day, and she thinks that is helping her thigh strength, and also facilitating some weight loss, so she has had less leg swelling lately.  She does have peripheral neuropathy, and the podiatrist has her on gabapentin 200 mg 3 times a  "day, which she says works perfectly fine for her pain symptoms, and does not think this is aggravating her balance issues.  She denies any recent changes in bowel or bladder function, denies any significant visual changes or headache.  Objective   Vital Signs:   Vitals:    12/21/23 1255   BP: 136/78   BP Location: Left arm   Patient Position: Sitting   Cuff Size: Adult   Pulse: 55   SpO2: 97%   Weight: 67.5 kg (148 lb 12.8 oz)   Height: 152.4 cm (60\")      /78 (BP Location: Left arm, Patient Position: Sitting, Cuff Size: Adult)   Pulse 55   Ht 152.4 cm (60\")   Wt 67.5 kg (148 lb 12.8 oz)   SpO2 97%   BMI 29.06 kg/m²     Body mass index is 29.06 kg/m².    Review of Systems   Constitutional:  Positive for activity change and appetite change. Negative for chills, diaphoresis, fever, unexpected weight gain and unexpected weight loss.   HENT:  Negative for congestion, ear discharge, ear pain, mouth sores, nosebleeds, rhinorrhea, sinus pressure, sore throat, swollen glands, trouble swallowing and voice change.    Eyes:  Negative for blurred vision, double vision, pain, redness and visual disturbance.   Respiratory:  Negative for cough, choking, chest tightness, shortness of breath and wheezing.    Cardiovascular:  Positive for leg swelling (Improved, but chronic and mild). Negative for chest pain and palpitations.        PND, orthopnea   Gastrointestinal:  Positive for vomiting. Negative for abdominal distention, abdominal pain, blood in stool, constipation, diarrhea, nausea and GERD.        Dysphagia, odynophagia   Endocrine: Negative for polydipsia, polyphagia and polyuria.   Genitourinary:  Positive for urinary incontinence (She has had some urge incontinence for years, nothing new). Negative for decreased urine volume, dysuria and hematuria.   Musculoskeletal:  Positive for arthralgias (unusual/atypica) and back pain. Negative for gait problem, joint swelling, myalgias and neck pain.   Skin:  Negative for " rash, skin lesions (worrisome/suspicious), wound and bruise.   Allergic/Immunologic: Negative for food allergies.   Neurological:  Positive for numbness. Negative for dizziness, tremors, seizures, syncope, speech difficulty, weakness, light-headedness, headache, memory problem and confusion.   Hematological:  Negative for adenopathy. Does not bruise/bleed easily.   Psychiatric/Behavioral:  Negative for sleep disturbance, suicidal ideas and depressed mood. The patient is not nervous/anxious.        Past History:  Medical History: has a past medical history of Allergic rhinitis, Carter esophagus (2006), Benign essential hypertension, Breast complaint (2008), Cataract, Class 1 obesity in adult, Diabetes mellitus, type 2 (2013), Diabetic peripheral neuropathy associated with type 2 diabetes mellitus, Diverticulitis (2005), Diverticulosis of large intestine without hemorrhage (2011), Edema, Fatigue, Fibromyalgia, Gastroesophageal reflux disease without esophagitis, Hammer toe, Herpes simplex, Hiatal hernia with gastroesophageal reflux, High risk medication use, Irritable bowel syndrome, Irritable bowel syndrome with constipation, Left retinal detachment (2010), Microscopic hematuria (1995), Mixed hyperlipidemia, Lizama neuroma, left (2010), Lizama's neuroma of both feet (2007), Lizama's neuroma of left foot (2008), Onychomycosis of left great toe, Plantar fasciitis, Recurrent major depression in partial remission, Rheumatoid arthritis involving multiple joints, Tear of meniscus of left knee (2008), Urinary incontinence (2022), and Vitamin D deficiency.   Surgical History: has a past surgical history that includes Lizama's Neuroma Excision (Left, 2008); Cystoscopy; Knee arthroscopy; Tonsillectomy and adenoidectomy; Dilation and curettage of uterus; Bunionectomy (Bilateral); Colonoscopy (2011); Esophagogastroduodenoscopy (2008); Upper gastrointestinal endoscopy; Tubal ligation; and Hysterectomy.   Family History: family  history includes Alzheimer's disease in her mother; Heart failure in her mother; Hyperlipidemia in her mother; Hypertension in her mother.   Social History: reports that she quit smoking about 33 years ago. Her smoking use included cigarettes. She started smoking about 54 years ago. She has a 20.00 pack-year smoking history. She has never been exposed to tobacco smoke. She has never used smokeless tobacco. She reports that she does not currently use alcohol. She reports that she does not use drugs.      Current Outpatient Medications:     amLODIPine (NORVASC) 5 MG tablet, Take 1 tablet by mouth Daily., Disp: 90 tablet, Rfl: 3    ammonium lactate (AMLACTIN) 12 % cream, , Disp: , Rfl:     aspirin 81 MG chewable tablet, Chew 1 tablet Daily., Disp: , Rfl:     dexlansoprazole (DEXILANT) 60 MG capsule, Take 1 capsule by mouth Daily., Disp: 90 capsule, Rfl: 3    Diclofenac Sodium (VOLTAREN) 1 % gel gel, , Disp: , Rfl:     DULoxetine (CYMBALTA) 60 MG capsule, Take 1 capsule by mouth Daily., Disp: 90 capsule, Rfl: 3    Enbrel Mini 50 MG/ML solution cartridge, Inject 50 mg under the skin into the appropriate area as directed Every 7 (Seven) Days., Disp: , Rfl:     Estrogens Conjugated (Premarin) 0.625 MG/GM vaginal cream, Premarin 0.625 mg/gram vaginal cream, Disp: , Rfl:     gabapentin (NEURONTIN) 100 MG capsule, Take 2 capsules by mouth Daily., Disp: , Rfl:     hydroxychloroquine (PLAQUENIL) 200 MG tablet, Take 1 tablet by mouth 2 (Two) Times a Day., Disp: , Rfl:     metoprolol succinate XL (TOPROL-XL) 100 MG 24 hr tablet, Take 1 tablet by mouth Daily., Disp: 90 tablet, Rfl: 3    ondansetron ODT (ZOFRAN-ODT) 4 MG disintegrating tablet, Place 1 tablet on the tongue Every 8 (Eight) Hours As Needed for Nausea or Vomiting., Disp: 20 tablet, Rfl: 0    pravastatin (PRAVACHOL) 20 MG tablet, Take 1 tablet by mouth Daily., Disp: 90 tablet, Rfl: 3    tiZANidine (ZANAFLEX) 4 MG tablet, Take 1 tablet by mouth every night at bedtime.,  Disp: 90 tablet, Rfl: 3    traMADol (ULTRAM) 50 MG tablet, Take 1 tablet by mouth Every 6 (Six) Hours As Needed for Moderate Pain., Disp: 360 tablet, Rfl: 1    valsartan-hydrochlorothiazide (DIOVAN-HCT) 320-25 MG per tablet, Take 1 tablet by mouth Daily., Disp: 90 tablet, Rfl: 3    Allergies: Myrbetriq [mirabegron], Erythromycin, Meloxicam, Metronidazole, and Cerave [albolene]    Physical Exam  Constitutional:       General: She is not in acute distress.     Appearance: Normal appearance. She is not toxic-appearing.   HENT:      Head: Normocephalic and atraumatic.      Right Ear: Ear canal and external ear normal.      Left Ear: Ear canal and external ear normal.      Nose: Nose normal.      Mouth/Throat:      Mouth: Mucous membranes are moist.      Pharynx: Oropharynx is clear.   Eyes:      General: No scleral icterus.     Extraocular Movements: Extraocular movements intact.      Conjunctiva/sclera: Conjunctivae normal.      Pupils: Pupils are equal, round, and reactive to light.   Neck:      Vascular: No carotid bruit.   Cardiovascular:      Rate and Rhythm: Normal rate and regular rhythm.      Pulses: Normal pulses.      Heart sounds: Normal heart sounds. No murmur heard.     No gallop.   Pulmonary:      Effort: Pulmonary effort is normal.      Breath sounds: Normal breath sounds. No wheezing or rales.   Chest:      Chest wall: No tenderness.   Abdominal:      General: Bowel sounds are normal. There is no distension.      Palpations: Abdomen is soft. There is no mass.      Tenderness: There is no abdominal tenderness. There is no guarding or rebound.      Hernia: A hernia is present.   Musculoskeletal:         General: No swelling or deformity. Normal range of motion.      Cervical back: Normal range of motion. No rigidity.      Right lower leg: Edema (Trace pitting at ankle) present.      Left lower leg: Edema (Trace pitting ankle) present.   Lymphadenopathy:      Cervical: No cervical adenopathy.   Skin:      General: Skin is warm and dry.      Capillary Refill: Capillary refill takes less than 2 seconds.      Coloration: Skin is not jaundiced or pale.      Findings: No erythema or rash.   Neurological:      General: No focal deficit present.      Mental Status: She is alert and oriented to person, place, and time.      Cranial Nerves: No cranial nerve deficit.      Motor: No weakness.      Coordination: Coordination normal.      Gait: Gait normal.      Deep Tendon Reflexes: Reflexes normal.   Psychiatric:         Mood and Affect: Mood normal.         Behavior: Behavior normal.         Thought Content: Thought content normal.         Judgment: Judgment normal.                   Assessment and Plan   Diagnoses and all orders for this visit:    1. Impairment of balance (Primary)  -     Ambulatory Referral to Physical Therapy Evaluate and treat  -     MRI Brain Without Contrast; Future  Patient has been erroneously marked as diabetic. Based on the available clinical information, she does not have diabetes and should therefore be excluded from diabetic health maintenance and quality measures for the remainder of the reporting period.  Differential diagnosis was discussed.  She does not have symptoms suggestive of normal pressure hydrocephalus.  The impairment of balance has been very gradual over time, and she does have chronic idiopathic peripheral neuropathy, along with a history of lumbar radiculitis which is improved over time.  Since her falls only seem to happen when she makes a quick adjustment in her movement, this is probably going to be multifactorial, but she has not had any imaging of her brain in many many years, so we will do an MRI of the brain without contrast, and refer to physical therapy.  She was advised to try to slow down a bit be more cautious, and if symptoms do not improve then we will refer to neurology for consultation.  She just had labs done a few months ago, so she does not wish to repeat any of  these right now.  2. Frequent falls  -     Ambulatory Referral to Physical Therapy Evaluate and treat  -     MRI Brain Without Contrast; Future    3. Menopausal problem  -     DEXA Bone Density Axial; Future    4. Screening for osteoporosis  -     DEXA Bone Density Axial; Future    5. Rheumatoid arthritis involving multiple sites with positive rheumatoid factor  -     traMADol (ULTRAM) 50 MG tablet; Take 1 tablet by mouth Every 6 (Six) Hours As Needed for Moderate Pain.  Dispense: 360 tablet; Refill: 1  Patient is managed by rheumatology, although I have been prescribing her pain medicine and she has been very reliable with this.  Side effects and risk of taking tramadol, alone in combination with gabapentin, have been reviewed and she indicates that she needs both and is willing to accept the risks.  Her [place] and drug screens have all been appropriate  6. Prediabetes  Patient had an A1c that was in the diabetes range several years ago but she was taking steroids regularly at that time for rheumatoid arthritis, and once the steroids were stopped she has remained in the prediabetes range.  She does not have diabetes  7. Essential hypertension, benign    8. Mixed hyperlipidemia    9. Encounter for long-term (current) use of other medications    10. Recurrent major depressive disorder, in partial remission  Well-controlled with Cymbalta 60 mg daily and she will continue  11. Gastroesophageal reflux disease without esophagitis    12. Carter's esophagus without dysplasia    13. Idiopathic peripheral neuropathy    14. Urge incontinence  This is chronic and stable, and she has a bladder stimulator in  15. Spinal stenosis of lumbar region without neurogenic claudication    16. OAB (overactive bladder)    17. Spondylolisthesis at L1-L2 level    18. Irritable bowel syndrome with diarrhea    19. Fibromyalgia    Other orders  -     amLODIPine (NORVASC) 5 MG tablet; Take 1 tablet by mouth Daily.  Dispense: 90 tablet; Refill:  3  -     dexlansoprazole (DEXILANT) 60 MG capsule; Take 1 capsule by mouth Daily.  Dispense: 90 capsule; Refill: 3  -     DULoxetine (CYMBALTA) 60 MG capsule; Take 1 capsule by mouth Daily.  Dispense: 90 capsule; Refill: 3  -     metoprolol succinate XL (TOPROL-XL) 100 MG 24 hr tablet; Take 1 tablet by mouth Daily.  Dispense: 90 tablet; Refill: 3  -     pravastatin (PRAVACHOL) 20 MG tablet; Take 1 tablet by mouth Daily.  Dispense: 90 tablet; Refill: 3  -     valsartan-hydrochlorothiazide (DIOVAN-HCT) 320-25 MG per tablet; Take 1 tablet by mouth Daily.  Dispense: 90 tablet; Refill: 3  -     tiZANidine (ZANAFLEX) 4 MG tablet; Take 1 tablet by mouth every night at bedtime.  Dispense: 90 tablet; Refill: 3            Follow Up   Return in about 6 months (around 6/21/2024) for Recheck, Nurse - AWV Subsequent.  Patient was given instructions and counseling regarding her condition or for health maintenance advice. Please see specific information pulled into the AVS if appropriate.     Donny Falcon MD

## 2024-03-22 ENCOUNTER — LAB (OUTPATIENT)
Dept: LAB | Facility: HOSPITAL | Age: 79
End: 2024-03-22
Payer: MEDICARE

## 2024-03-22 ENCOUNTER — OFFICE VISIT (OUTPATIENT)
Dept: GASTROENTEROLOGY | Facility: CLINIC | Age: 79
End: 2024-03-22
Payer: MEDICARE

## 2024-03-22 VITALS
OXYGEN SATURATION: 99 % | HEART RATE: 59 BPM | TEMPERATURE: 96.6 F | BODY MASS INDEX: 29.21 KG/M2 | HEIGHT: 60 IN | SYSTOLIC BLOOD PRESSURE: 120 MMHG | DIASTOLIC BLOOD PRESSURE: 86 MMHG | WEIGHT: 148.8 LBS

## 2024-03-22 DIAGNOSIS — R11.15 CYCLICAL VOMITING: ICD-10-CM

## 2024-03-22 DIAGNOSIS — R11.15 CYCLICAL VOMITING: Primary | ICD-10-CM

## 2024-03-22 LAB
25(OH)D3 SERPL-MCNC: 48.9 NG/ML (ref 30–100)
ALBUMIN SERPL-MCNC: 3.9 G/DL (ref 3.5–5.2)
ALBUMIN/GLOB SERPL: 1.6 G/DL
ALP SERPL-CCNC: 94 U/L (ref 39–117)
ALT SERPL W P-5'-P-CCNC: 17 U/L (ref 1–33)
ANION GAP SERPL CALCULATED.3IONS-SCNC: 9.5 MMOL/L (ref 5–15)
AST SERPL-CCNC: 25 U/L (ref 1–32)
BASOPHILS # BLD AUTO: 0.05 10*3/MM3 (ref 0–0.2)
BASOPHILS NFR BLD AUTO: 0.6 % (ref 0–1.5)
BILIRUB SERPL-MCNC: 0.3 MG/DL (ref 0–1.2)
BUN SERPL-MCNC: 26 MG/DL (ref 8–23)
BUN/CREAT SERPL: 35.1 (ref 7–25)
CALCIUM SPEC-SCNC: 9.6 MG/DL (ref 8.6–10.5)
CHLORIDE SERPL-SCNC: 104 MMOL/L (ref 98–107)
CO2 SERPL-SCNC: 26.5 MMOL/L (ref 22–29)
CREAT SERPL-MCNC: 0.74 MG/DL (ref 0.57–1)
CRP SERPL-MCNC: <0.3 MG/DL (ref 0–0.5)
DEPRECATED RDW RBC AUTO: 41.2 FL (ref 37–54)
EGFRCR SERPLBLD CKD-EPI 2021: 84 ML/MIN/1.73
EOSINOPHIL # BLD AUTO: 0.38 10*3/MM3 (ref 0–0.4)
EOSINOPHIL NFR BLD AUTO: 4.8 % (ref 0.3–6.2)
ERYTHROCYTE [DISTWIDTH] IN BLOOD BY AUTOMATED COUNT: 12.9 % (ref 12.3–15.4)
GLOBULIN UR ELPH-MCNC: 2.4 GM/DL
GLUCOSE SERPL-MCNC: 82 MG/DL (ref 65–99)
HCT VFR BLD AUTO: 38.8 % (ref 34–46.6)
HGB BLD-MCNC: 12.8 G/DL (ref 12–15.9)
IMM GRANULOCYTES # BLD AUTO: 0.03 10*3/MM3 (ref 0–0.05)
IMM GRANULOCYTES NFR BLD AUTO: 0.4 % (ref 0–0.5)
LYMPHOCYTES # BLD AUTO: 1.9 10*3/MM3 (ref 0.7–3.1)
LYMPHOCYTES NFR BLD AUTO: 24.1 % (ref 19.6–45.3)
MAGNESIUM SERPL-MCNC: 2.2 MG/DL (ref 1.6–2.4)
MCH RBC QN AUTO: 28.8 PG (ref 26.6–33)
MCHC RBC AUTO-ENTMCNC: 33 G/DL (ref 31.5–35.7)
MCV RBC AUTO: 87.4 FL (ref 79–97)
MONOCYTES # BLD AUTO: 1.08 10*3/MM3 (ref 0.1–0.9)
MONOCYTES NFR BLD AUTO: 13.7 % (ref 5–12)
NEUTROPHILS NFR BLD AUTO: 4.46 10*3/MM3 (ref 1.7–7)
NEUTROPHILS NFR BLD AUTO: 56.4 % (ref 42.7–76)
NRBC BLD AUTO-RTO: 0 /100 WBC (ref 0–0.2)
PLATELET # BLD AUTO: 290 10*3/MM3 (ref 140–450)
PMV BLD AUTO: 9.9 FL (ref 6–12)
POTASSIUM SERPL-SCNC: 4.6 MMOL/L (ref 3.5–5.2)
PROT SERPL-MCNC: 6.3 G/DL (ref 6–8.5)
RBC # BLD AUTO: 4.44 10*6/MM3 (ref 3.77–5.28)
SODIUM SERPL-SCNC: 140 MMOL/L (ref 136–145)
VIT B12 BLD-MCNC: 561 PG/ML (ref 211–946)
WBC NRBC COR # BLD AUTO: 7.9 10*3/MM3 (ref 3.4–10.8)

## 2024-03-22 PROCEDURE — 82306 VITAMIN D 25 HYDROXY: CPT | Performed by: PHYSICIAN ASSISTANT

## 2024-03-22 PROCEDURE — 80053 COMPREHEN METABOLIC PANEL: CPT | Performed by: PHYSICIAN ASSISTANT

## 2024-03-22 PROCEDURE — 86258 DGP ANTIBODY EACH IG CLASS: CPT

## 2024-03-22 PROCEDURE — 86231 EMA EACH IG CLASS: CPT

## 2024-03-22 PROCEDURE — 83735 ASSAY OF MAGNESIUM: CPT | Performed by: PHYSICIAN ASSISTANT

## 2024-03-22 PROCEDURE — 82607 VITAMIN B-12: CPT | Performed by: PHYSICIAN ASSISTANT

## 2024-03-22 PROCEDURE — 82784 ASSAY IGA/IGD/IGG/IGM EACH: CPT

## 2024-03-22 PROCEDURE — 85025 COMPLETE CBC W/AUTO DIFF WBC: CPT

## 2024-03-22 PROCEDURE — 36415 COLL VENOUS BLD VENIPUNCTURE: CPT

## 2024-03-22 PROCEDURE — 86364 TISS TRNSGLTMNASE EA IG CLAS: CPT

## 2024-03-22 PROCEDURE — 86140 C-REACTIVE PROTEIN: CPT

## 2024-03-22 RX ORDER — OXYCODONE HYDROCHLORIDE AND ACETAMINOPHEN 5; 325 MG/1; MG/1
TABLET ORAL
COMMUNITY
Start: 2024-01-03

## 2024-03-22 NOTE — PROGRESS NOTES
Follow Up      Patient Name: Mady Pichardo  : 1945   MRN: [id number]     Chief Complaint:    Chief Complaint   Patient presents with    Vomiting     Intermittent        History of Present Illness: Mady Pichardo is a 78 y.o. female who is here today for follow up on vomiting.     Pt underwent umbilical hernia repair in January with Dr. Farncis in [place]. She is overall doing well postoperatively.    She notes intermittent episodes of vomiting that occur sporadically and last a few days at a time. She denies specific aggravating foods, factors or timing. She denies associated nausea, abdominal pain or bloating. She notes remote h/o GES that revealed delayed gastric emptying. She otherwise denies changes in medications, supplements, etc. Pt has found some relief of sx with use of zofran.     GERD is well controlled with Dexilant 60mg daily.     Patient denies associated fever, chills, diarrhea, constipation, hematemesis, dysphagia, hematochezia, melena, weight loss or gain, dysuria, jaundice or bruising.    CT A/P w/wo contrast 10/2023: No acute process is seen. 1.8cm subumbilical nonspecific subcutaneous fluid collection.      EGD 3/2023 with Dr. Osborn at [facility]. Z-line irregular at 34cm. 4cm hiatal hernia. Esophageal mucosal changes consistent with short-segment Carter's esophagus at GEJ, 1cm in length. Normal esophagus otherwise. Diffuse moderate gastritis. Normal duodenum. Recommend repeat EGD in 3 years. Pathology not available at time of dictation.      CSY few years ago with significant diverticulosis. No polyps, no further CSY recommended.     Subjective      Review of Systems:   Review of Systems   Constitutional:  Negative for appetite change, chills, diaphoresis, fatigue, fever, unexpected weight gain and unexpected weight loss.   HENT:  Negative for drooling, facial swelling, mouth sores, rhinorrhea, sore throat, tinnitus, trouble swallowing and voice change.    Eyes:  Negative.    Respiratory:  Negative for cough, chest tightness and shortness of breath.    Cardiovascular:  Negative for chest pain.   Gastrointestinal:  Positive for vomiting. Negative for abdominal pain, blood in stool, constipation, diarrhea, nausea, GERD and indigestion.   Genitourinary:  Negative for dysuria, flank pain, hematuria and pelvic pain.   Musculoskeletal:  Negative for arthralgias and myalgias.   Skin:  Negative for color change, pallor and rash.   Neurological:  Negative for dizziness, tremors, syncope, weakness and numbness.   Psychiatric/Behavioral:  Negative for hallucinations and sleep disturbance. The patient is not nervous/anxious.    All other systems reviewed and are negative.      Medications:     Current Outpatient Medications:     oxyCODONE-acetaminophen (PERCOCET) 5-325 MG per tablet, , Disp: , Rfl:     amLODIPine (NORVASC) 5 MG tablet, Take 1 tablet by mouth Daily., Disp: 90 tablet, Rfl: 3    ammonium lactate (AMLACTIN) 12 % cream, , Disp: , Rfl:     aspirin 81 MG chewable tablet, Chew 1 tablet Daily., Disp: , Rfl:     dexlansoprazole (DEXILANT) 60 MG capsule, Take 1 capsule by mouth Daily., Disp: 90 capsule, Rfl: 3    Diclofenac Sodium (VOLTAREN) 1 % gel gel, , Disp: , Rfl:     DULoxetine (CYMBALTA) 60 MG capsule, Take 1 capsule by mouth Daily., Disp: 90 capsule, Rfl: 3    Enbrel Mini 50 MG/ML solution cartridge, Inject 50 mg under the skin into the appropriate area as directed Every 7 (Seven) Days., Disp: , Rfl:     Estrogens Conjugated (Premarin) 0.625 MG/GM vaginal cream, Premarin 0.625 mg/gram vaginal cream, Disp: , Rfl:     gabapentin (NEURONTIN) 100 MG capsule, Take 2 capsules by mouth Daily., Disp: , Rfl:     hydroxychloroquine (PLAQUENIL) 200 MG tablet, Take 1 tablet by mouth 2 (Two) Times a Day., Disp: , Rfl:     metoprolol succinate XL (TOPROL-XL) 100 MG 24 hr tablet, Take 1 tablet by mouth Daily., Disp: 90 tablet, Rfl: 3    ondansetron ODT (ZOFRAN-ODT) 4 MG disintegrating  tablet, Place 1 tablet on the tongue Every 8 (Eight) Hours As Needed for Nausea or Vomiting., Disp: 20 tablet, Rfl: 0    pravastatin (PRAVACHOL) 20 MG tablet, Take 1 tablet by mouth Daily., Disp: 90 tablet, Rfl: 3    tiZANidine (ZANAFLEX) 4 MG tablet, Take 1 tablet by mouth every night at bedtime., Disp: 90 tablet, Rfl: 3    traMADol (ULTRAM) 50 MG tablet, Take 1 tablet by mouth Every 6 (Six) Hours As Needed for Moderate Pain., Disp: 360 tablet, Rfl: 1    valsartan-hydrochlorothiazide (DIOVAN-HCT) 320-25 MG per tablet, Take 1 tablet by mouth Daily., Disp: 90 tablet, Rfl: 3    Allergies:   Allergies   Allergen Reactions    Myrbetriq [Mirabegron] Nausea And Vomiting    Erythromycin GI Intolerance    Meloxicam GI Bleeding    Metronidazole GI Intolerance    Cerave [Albolene] Rash       Social History:   Social History     Socioeconomic History    Marital status:    Tobacco Use    Smoking status: Former     Current packs/day: 0.00     Average packs/day: 1 pack/day for 20.0 years (20.0 ttl pk-yrs)     Types: Cigarettes     Start date:      Quit date:      Years since quittin.2     Passive exposure: Never    Smokeless tobacco: Never   Vaping Use    Vaping status: Never Used   Substance and Sexual Activity    Alcohol use: Not Currently    Drug use: Never    Sexual activity: Defer        Surgical History:   Past Surgical History:   Procedure Laterality Date    BUNIONECTOMY Bilateral     COLONOSCOPY      CYSTOSCOPY      DILATATION AND CURETTAGE      ENDOSCOPY      WITH BIOPSY ( ,, , 2011)    HERNIA REPAIR      HYSTERECTOMY      KNEE ARTHROSCOPY      CAZARES'S NEUROMA EXCISION Left     ALSO 2010    TONSILLECTOMY AND ADENOIDECTOMY      TUBAL ABDOMINAL LIGATION      UPPER GASTROINTESTINAL ENDOSCOPY          Medical History:   Past Medical History:   Diagnosis Date    Allergic rhinitis     Carter esophagus 2006    EGD WITH BIOPSY      ,, , 2011    Benign essential  "hypertension     Breast complaint 2008    \"DIMPLE\" DX MMG AND ULTRASOUND, ALL NORMAL    Cataract     NOT IN NEED OF SURGERY YET    Class 1 obesity in adult     Diabetes mellitus, type 2 2013    Diabetic peripheral neuropathy associated with type 2 diabetes mellitus     Diverticulitis 2005    IV ATBX - IMPROVED    Diverticulosis of large intestine without hemorrhage 2011    Edema     WITH VENOUS INSUFFICIENCY    Fatigue     Fibromyalgia     Gastroesophageal reflux disease without esophagitis     Hammer toe     Herpes simplex     GENITAL  - CONTROLLED W/ CHRONIC FAMVIR    Hiatal hernia with gastroesophageal reflux     High risk medication use     Irritable bowel syndrome     Irritable bowel syndrome with constipation     Left retinal detachment 2010    LASER TREATMENT, SUCCESSFUL    Microscopic hematuria 1995    IVP/CYSTOSCOPY    Mixed hyperlipidemia     Lizama neuroma, left 2010    2ND    Lizama's neuroma of both feet 2007    INJECTION, FAILED, SURGERY PLANNED    Lizama's neuroma of left foot 2008    NEUROMA SURGICALLY REMOVED, LEFT FOOT    Onychomycosis of left great toe     Plantar fasciitis     Recurrent major depression in partial remission     Rheumatoid arthritis involving multiple joints     Tear of meniscus of left knee 2008    Urinary incontinence 2022    Bladder stimulator implanted and doing well    Vitamin D deficiency         Objective     Physical Exam:  Vital Signs:   Vitals:    03/22/24 0942   BP: 120/86   Pulse: 59   Temp: 96.6 °F (35.9 °C)   TempSrc: Temporal   SpO2: 99%   Weight: 67.5 kg (148 lb 12.8 oz)   Height: 152.4 cm (60\")     Body mass index is 29.06 kg/m².     Physical Exam  Vitals and nursing note reviewed.   Constitutional:       Appearance: Normal appearance. She is normal weight. She is not ill-appearing or diaphoretic.      Comments: Pleasantly conversant   HENT:      Head: Normocephalic and atraumatic.      Right Ear: External ear normal.      Left Ear: External ear normal.      Nose: " Nose normal.      Mouth/Throat:      Mouth: Mucous membranes are moist.      Pharynx: Oropharynx is clear.   Eyes:      Conjunctiva/sclera: Conjunctivae normal.      Pupils: Pupils are equal, round, and reactive to light.   Neck:      Thyroid: No thyromegaly.   Cardiovascular:      Rate and Rhythm: Normal rate and regular rhythm.      Pulses: Normal pulses.      Heart sounds: Normal heart sounds.   Pulmonary:      Effort: Pulmonary effort is normal.      Breath sounds: Normal breath sounds.   Abdominal:      General: Abdomen is flat. Bowel sounds are normal. There is no distension.      Tenderness: There is no abdominal tenderness.   Musculoskeletal:         General: Normal range of motion.      Cervical back: Normal range of motion and neck supple.   Skin:     General: Skin is warm and dry.   Neurological:      General: No focal deficit present.      Mental Status: She is oriented to person, place, and time.   Psychiatric:         Mood and Affect: Mood normal.         Assessment / Plan      Assessment/Plan:   There are no diagnoses linked to this encounter.     GERD  Carter's esophagus, dysplasia unspecified  Cyclical vomiting  H/o delayed gastric emptying via GES in past   - continue all medications as prescribed   - recommend trial of hema root 500mg TID AC   - recommend continued adequate oral hydration, start soluble fiber supplement daily   - pt given GERD diet instructions, advised to avoid GI irritants such as caffeine, carbonation, EtOH, tobacco, chocolate, peppermint, acid-based foods   - previous imaging, endoscopy reports reviewed, will request pathology from most recent EGD   - schedule 4h GES   - obtain CBC, CMP, CRP, celiac panel, Mg, TSH, vit B12, vit D   - follow up in clinic after completion of above studies   - call clinic at any time for questions or new / worsened sx    Follow Up:   Return for Next scheduled follow up.    Plan of care reviewed with the patient at the conclusion of today's  visit.  Education was provided regarding diagnosis, management, and any prescribed or recommended OTC medications.  Patient verbalized understanding of and agreement with management plan.     NOTE TO PATIENT: The 21st Century Cures Act makes medical notes like these available to patients in the interest of transparency. However, be advised this is a medical document. It is intended as peer to peer communication. It is written in medical language and may contain abbreviations or verbiage that are unfamiliar. It may appear blunt or direct. Medical documents are intended to carry relevant information, facts as evident, and the clinical opinion of the practitioner.     Time Statement:   Discussed plan of care in detail with patient today. Patient verbally understands and agrees. I have spent 30 minutes reviewing available diagnostics, obtaining history, examining the patient, developing a treatment plan, and educating the patient on disease process and plan of care.     Nita Riley PA-C   [facility]

## 2024-03-25 LAB
ENDOMYSIUM IGA SER QL: NEGATIVE
GLIADIN PEPTIDE IGA SER-ACNC: 3 UNITS (ref 0–19)
GLIADIN PEPTIDE IGG SER-ACNC: 2 UNITS (ref 0–19)
IGA SERPL-MCNC: 93 MG/DL (ref 64–422)
TTG IGA SER-ACNC: <2 U/ML (ref 0–3)
TTG IGG SER-ACNC: <2 U/ML (ref 0–5)

## 2024-05-13 ENCOUNTER — OFFICE VISIT (OUTPATIENT)
Dept: FAMILY MEDICINE CLINIC | Facility: CLINIC | Age: 79
End: 2024-05-13
Payer: MEDICARE

## 2024-05-13 ENCOUNTER — TELEPHONE (OUTPATIENT)
Dept: FAMILY MEDICINE CLINIC | Facility: CLINIC | Age: 79
End: 2024-05-13

## 2024-05-13 VITALS
WEIGHT: 148.2 LBS | HEART RATE: 61 BPM | OXYGEN SATURATION: 96 % | SYSTOLIC BLOOD PRESSURE: 120 MMHG | BODY MASS INDEX: 27.98 KG/M2 | HEIGHT: 61 IN | DIASTOLIC BLOOD PRESSURE: 78 MMHG

## 2024-05-13 DIAGNOSIS — Z00.01 ENCOUNTER FOR GENERAL ADULT MEDICAL EXAMINATION WITH ABNORMAL FINDINGS: Primary | ICD-10-CM

## 2024-05-13 DIAGNOSIS — Z86.39 HISTORY OF DIABETIC GASTROPARESIS: ICD-10-CM

## 2024-05-13 DIAGNOSIS — Z86.39 HISTORY OF STEROID-INDUCED DIABETES MELLITUS: ICD-10-CM

## 2024-05-13 DIAGNOSIS — E53.8 B12 DEFICIENCY: ICD-10-CM

## 2024-05-13 DIAGNOSIS — K22.70 BARRETT'S ESOPHAGUS WITHOUT DYSPLASIA: ICD-10-CM

## 2024-05-13 DIAGNOSIS — E55.9 VITAMIN D DEFICIENCY: ICD-10-CM

## 2024-05-13 DIAGNOSIS — M05.79 RHEUMATOID ARTHRITIS INVOLVING MULTIPLE SITES WITH POSITIVE RHEUMATOID FACTOR: ICD-10-CM

## 2024-05-13 DIAGNOSIS — K21.9 GASTROESOPHAGEAL REFLUX DISEASE WITHOUT ESOPHAGITIS: ICD-10-CM

## 2024-05-13 DIAGNOSIS — I10 ESSENTIAL HYPERTENSION, BENIGN: ICD-10-CM

## 2024-05-13 DIAGNOSIS — G60.9 IDIOPATHIC PERIPHERAL NEUROPATHY: ICD-10-CM

## 2024-05-13 DIAGNOSIS — F33.42 RECURRENT MAJOR DEPRESSION IN FULL REMISSION: ICD-10-CM

## 2024-05-13 DIAGNOSIS — E78.2 MIXED HYPERLIPIDEMIA: ICD-10-CM

## 2024-05-13 DIAGNOSIS — K58.0 IRRITABLE BOWEL SYNDROME WITH DIARRHEA: ICD-10-CM

## 2024-05-13 DIAGNOSIS — M48.061 SPINAL STENOSIS OF LUMBAR REGION WITHOUT NEUROGENIC CLAUDICATION: ICD-10-CM

## 2024-05-13 DIAGNOSIS — I35.1 NONRHEUMATIC AORTIC VALVE INSUFFICIENCY: ICD-10-CM

## 2024-05-13 DIAGNOSIS — R53.83 OTHER FATIGUE: ICD-10-CM

## 2024-05-13 DIAGNOSIS — Z79.899 ENCOUNTER FOR LONG-TERM (CURRENT) USE OF OTHER MEDICATIONS: ICD-10-CM

## 2024-05-13 DIAGNOSIS — R73.03 PREDIABETES: ICD-10-CM

## 2024-05-13 PROCEDURE — 1160F RVW MEDS BY RX/DR IN RCRD: CPT | Performed by: FAMILY MEDICINE

## 2024-05-13 PROCEDURE — 3074F SYST BP LT 130 MM HG: CPT | Performed by: FAMILY MEDICINE

## 2024-05-13 PROCEDURE — 99213 OFFICE O/P EST LOW 20 MIN: CPT | Performed by: FAMILY MEDICINE

## 2024-05-13 PROCEDURE — 1170F FXNL STATUS ASSESSED: CPT | Performed by: FAMILY MEDICINE

## 2024-05-13 PROCEDURE — G0439 PPPS, SUBSEQ VISIT: HCPCS | Performed by: FAMILY MEDICINE

## 2024-05-13 PROCEDURE — 3078F DIAST BP <80 MM HG: CPT | Performed by: FAMILY MEDICINE

## 2024-05-13 PROCEDURE — 1126F AMNT PAIN NOTED NONE PRSNT: CPT | Performed by: FAMILY MEDICINE

## 2024-05-13 PROCEDURE — 1159F MED LIST DOCD IN RCRD: CPT | Performed by: FAMILY MEDICINE

## 2024-05-13 NOTE — TELEPHONE ENCOUNTER
I am being a little nit-picky here, but pt had EGD at [facility] 3/2023 with Dr Osborn, and we never got pathology report from biopsy. Can you please obtain? Thanks   Assumed care of pt @ 2300. pt a+ox4, sitting in chair uncomfortably awaiting CT. pt c/o pain lower pelvic pain and requesting medication. will discuss medication with MD. pt stable @ time of transport to CT.

## 2024-05-13 NOTE — PROGRESS NOTES
The ABCs of the Annual Wellness Visit  Subsequent Medicare Wellness Visit    Subjective    Mady Pichardo is a 78 y.o. female who presents for a Subsequent Medicare Wellness Visit.    The following portions of the patient's history were reviewed and   updated as appropriate: allergies, current medications, past family history, past medical history, past social history, past surgical history, and problem list.    Compared to one year ago, the patient feels her physical   health is better.    Compared to one year ago, the patient feels her mental   health is better.    Recent Hospitalizations:  She was not admitted to the hospital during the last year.       Current Medical Providers:  Patient Care Team:  Donny Falcon MD as PCP - General (Family Medicine)    Outpatient Medications Prior to Visit   Medication Sig Dispense Refill    amLODIPine (NORVASC) 5 MG tablet Take 1 tablet by mouth Daily. 90 tablet 3    ammonium lactate (AMLACTIN) 12 % cream       aspirin 81 MG chewable tablet Chew 1 tablet Daily.      dexlansoprazole (DEXILANT) 60 MG capsule Take 1 capsule by mouth Daily. 90 capsule 3    Diclofenac Sodium (VOLTAREN) 1 % gel gel       DULoxetine (CYMBALTA) 60 MG capsule Take 1 capsule by mouth Daily. 90 capsule 3    Enbrel Mini 50 MG/ML solution cartridge Inject 50 mg under the skin into the appropriate area as directed Every 7 (Seven) Days.      Estrogens Conjugated (Premarin) 0.625 MG/GM vaginal cream Premarin 0.625 mg/gram vaginal cream      gabapentin (NEURONTIN) 100 MG capsule Take 2 capsules by mouth Daily.      hydroxychloroquine (PLAQUENIL) 200 MG tablet Take 1 tablet by mouth 2 (Two) Times a Day.      metoprolol succinate XL (TOPROL-XL) 100 MG 24 hr tablet Take 1 tablet by mouth Daily. 90 tablet 3    ondansetron ODT (ZOFRAN-ODT) 4 MG disintegrating tablet Place 1 tablet on the tongue Every 8 (Eight) Hours As Needed for Nausea or Vomiting. 20 tablet 0    pravastatin (PRAVACHOL) 20 MG tablet Take 1 tablet  by mouth Daily. 90 tablet 3    tiZANidine (ZANAFLEX) 4 MG tablet Take 1 tablet by mouth every night at bedtime. 90 tablet 3    traMADol (ULTRAM) 50 MG tablet Take 1 tablet by mouth Every 6 (Six) Hours As Needed for Moderate Pain. 360 tablet 1    valsartan-hydrochlorothiazide (DIOVAN-HCT) 320-25 MG per tablet Take 1 tablet by mouth Daily. 90 tablet 3    oxyCODONE-acetaminophen (PERCOCET) 5-325 MG per tablet  (Patient not taking: Reported on 5/13/2024)       No facility-administered medications prior to visit.       Opioid medication/s are on active medication list.  and I have evaluated her active treatment plan and pain score trends (see table).  There were no vitals filed for this visit.  I have reviewed the chart for potential of high risk medication and harmful drug interactions in the elderly.          Aspirin is on active medication list. Aspirin use is indicated based on review of current medical condition/s. Pros and cons of this therapy have been discussed today. Benefits of this medication outweigh potential harm.  Patient has been encouraged to continue taking this medication.  .      Patient Active Problem List   Diagnosis    Prediabetes    Essential hypertension, benign    Mixed hyperlipidemia    Encounter for long-term (current) use of other medications    Rheumatoid arthritis involving multiple sites with positive rheumatoid factor    Gastroesophageal reflux disease without esophagitis    Irritable bowel syndrome with diarrhea    Carter's esophagus without dysplasia    Idiopathic peripheral neuropathy    Lumbar radiculitis    Urge incontinence    Spinal stenosis of lumbar region without neurogenic claudication    Lower urinary tract symptoms (LUTS)    OAB (overactive bladder)    Urgency of urination    Spondylolisthesis at L1-L2 level    Recurrent major depression in full remission    History of diabetic gastroparesis    Nonrheumatic aortic valve insufficiency     Advance Care Planning   Advance Care  "Planning     Advance Directive is not on file.  ACP discussion was held with the patient during this visit. Patient does not have an advance directive, information provided.     Objective    Vitals:    24 1354   BP: 120/78   BP Location: Left arm   Patient Position: Sitting   Cuff Size: Adult   Pulse: 61   SpO2: 96%   Weight: 67.2 kg (148 lb 3.2 oz)   Height: 153.7 cm (60.5\")     Estimated body mass index is 28.47 kg/m² as calculated from the following:    Height as of this encounter: 153.7 cm (60.5\").    Weight as of this encounter: 67.2 kg (148 lb 3.2 oz).           Does the patient have evidence of cognitive impairment? No          HEALTH RISK ASSESSMENT    Smoking Status:  Social History     Tobacco Use   Smoking Status Former    Current packs/day: 0.00    Average packs/day: 1 pack/day for 20.0 years (20.0 ttl pk-yrs)    Types: Cigarettes    Start date:     Quit date:     Years since quittin.3    Passive exposure: Never   Smokeless Tobacco Never     Alcohol Consumption:  Social History     Substance and Sexual Activity   Alcohol Use Not Currently     Fall Risk Screen:    STEADI Fall Risk Assessment was completed, and patient is at HIGH risk for falls. Assessment completed on:2024    Depression Screenin/13/2024     1:55 PM   PHQ-2/PHQ-9 Depression Screening   Little Interest or Pleasure in Doing Things 0-->not at all   Feeling Down, Depressed or Hopeless 0-->not at all   PHQ-9: Brief Depression Severity Measure Score 0       Health Habits and Functional and Cognitive Screenin/13/2024     1:55 PM   Functional & Cognitive Status   Do you have difficulty preparing food and eating? No   Do you have difficulty bathing yourself, getting dressed or grooming yourself? No   Do you have difficulty using the toilet? No   Do you have difficulty moving around from place to place? No   Do you have trouble with steps or getting out of a bed or a chair? Yes   Current Diet Well Balanced " Diet   Dental Exam Up to date   Eye Exam Up to date   Exercise (times per week) 5 times per week   Current Exercises Include Walking   Do you need help using the phone?  No   Are you deaf or do you have serious difficulty hearing?  Yes   Do you need help to go to places out of walking distance? Yes   Do you need help shopping? Yes   Do you need help preparing meals?  No   Do you need help with housework?  No   Do you need help with laundry? No   Do you need help taking your medications? No   Do you need help managing money? No   Do you ever drive or ride in a car without wearing a seat belt? No   Have you felt unusual stress, anger or loneliness in the last month? No   Who do you live with? Spouse   If you need help, do you have trouble finding someone available to you? No   Have you been bothered in the last four weeks by sexual problems? No   Do you have difficulty concentrating, remembering or making decisions? Yes       Age-appropriate Screening Schedule:  Refer to the list below for future screening recommendations based on patient's age, sex and/or medical conditions. Orders for these recommended tests are listed in the plan section. The patient has been provided with a written plan.    Health Maintenance   Topic Date Due    URINE MICROALBUMIN  Never done    TDAP/TD VACCINES (1 - Tdap) Never done    ANNUAL WELLNESS VISIT  05/23/2022    DIABETIC EYE EXAM  05/23/2023    HEMOGLOBIN A1C  12/16/2023    COVID-19 Vaccine (8 - 2023-24 season) 01/27/2024    LIPID PANEL  06/16/2024    INFLUENZA VACCINE  08/01/2024    BMI FOLLOWUP  12/21/2024    DXA SCAN  12/21/2025    HEPATITIS C SCREENING  Completed    RSV Vaccine - Adults  Completed    Pneumococcal Vaccine 65+  Completed    ZOSTER VACCINE  Completed    COLORECTAL CANCER SCREENING  Discontinued                  CMS Preventative Services Quick Reference  Risk Factors Identified During Encounter  Chronic Pain:  Adequately controlled with management of her rheumatoid  arthritis and as needed tramadol  The above risks/problems have been discussed with the patient.  Pertinent information has been shared with the patient in the After Visit Summary.  An After Visit Summary and PPPS were made available to the patient.    Follow Up:   Next Medicare Wellness visit to be scheduled in 1 year.       Additional E&M Note during same encounter follows:  Patient has multiple medical problems which are significant and separately identifiable that require additional work above and beyond the Medicare Wellness Visit.      Chief Complaint  Medicare Wellness-subsequent    Subjective        HPI  Mady Pichardo is also being seen today for recheck on hypertension and a few other issues.  Patient reported some impairment of balance when I saw her last at the end of 2023, but MRI of the brain was normal.  She has been going to physical therapy and that is helping.    The patient did have some episodes of cyclical vomiting, but she was seen in the GI clinic, and workup was negative.  She had an EGD in March 2023 that was negative except for hiatal hernia and Carter's esophagus, which were both chronic.  She said she ended up losing nearly 40 pounds through the time she was having the recurrent vomiting (although our records only show a 25lb wt loss from 3/2023), but her symptoms have at this time resolved, and she has not had any further episodes in the last week and 1/2 to 2 weeks.  Even with the weight loss, her blood pressure continues to run in a good range with her current medications, and she has not had to lower the dose of those yet.  However, she understands the importance of continuing to monitor her blood pressure, as that could change at any time.    The patient's rheumatoid arthritis continues to be well-controlled with the help of rheumatology, and she only needs tramadol occasionally, and says she does not need a refill on this now.  She denies any new problems otherwise.  The patient did  "move to [place], but is said that she does wish to continue coming here for primary care, and we will of course be glad to continue seeing her.    Review of Systems   Constitutional:  Negative for activity change, appetite change, chills and fever.   HENT:  Negative for congestion, facial swelling, nosebleeds, sore throat, trouble swallowing and voice change.    Eyes:  Negative for visual disturbance.   Respiratory:  Negative for cough, choking, chest tightness, shortness of breath and wheezing.    Cardiovascular:  Negative for chest pain, palpitations and leg swelling.   Gastrointestinal:  Negative for abdominal distention, abdominal pain, blood in stool, constipation, diarrhea, nausea and vomiting.   Endocrine: Negative for polydipsia, polyphagia and polyuria.   Genitourinary:  Negative for dysuria and hematuria.   Musculoskeletal:  Positive for arthralgias. Negative for back pain, joint swelling, myalgias and neck pain.   Skin:  Negative for rash and wound.   Allergic/Immunologic: Positive for environmental allergies.   Neurological:  Negative for seizures, syncope, facial asymmetry, speech difficulty, weakness and light-headedness.   Hematological:  Negative for adenopathy.   Psychiatric/Behavioral:  Negative for dysphoric mood, sleep disturbance and suicidal ideas. The patient is not nervous/anxious.        Objective   Vital Signs:  /78 (BP Location: Left arm, Patient Position: Sitting, Cuff Size: Adult)   Pulse 61   Ht 153.7 cm (60.5\")   Wt 67.2 kg (148 lb 3.2 oz)   SpO2 96%   BMI 28.47 kg/m²     Physical Exam  Constitutional:       General: She is not in acute distress.     Appearance: She is not ill-appearing, toxic-appearing or diaphoretic.   HENT:      Head: Normocephalic and atraumatic.      Right Ear: Ear canal and external ear normal.      Left Ear: Ear canal and external ear normal.      Nose: Nose normal.      Mouth/Throat:      Mouth: Mucous membranes are moist.      Pharynx: Oropharynx " is clear.   Eyes:      General: No scleral icterus.     Extraocular Movements: Extraocular movements intact.      Conjunctiva/sclera: Conjunctivae normal.      Pupils: Pupils are equal, round, and reactive to light.   Neck:      Vascular: No carotid bruit.   Cardiovascular:      Rate and Rhythm: Normal rate and regular rhythm.      Pulses: Normal pulses.      Heart sounds: Normal heart sounds.   Pulmonary:      Effort: Pulmonary effort is normal.      Breath sounds: Normal breath sounds. No wheezing, rhonchi or rales.   Chest:      Chest wall: No tenderness.   Abdominal:      General: Bowel sounds are normal. There is no distension.      Palpations: Abdomen is soft. There is no mass.      Tenderness: There is no abdominal tenderness. There is no guarding or rebound.   Musculoskeletal:         General: No swelling or deformity. Normal range of motion.      Cervical back: Normal range of motion. No rigidity.      Right lower leg: No edema.      Left lower leg: No edema.   Lymphadenopathy:      Cervical: No cervical adenopathy.   Skin:     General: Skin is warm and dry.      Capillary Refill: Capillary refill takes less than 2 seconds.      Coloration: Skin is not jaundiced or pale.      Findings: No bruising, erythema or rash.   Neurological:      General: No focal deficit present.      Mental Status: She is alert and oriented to person, place, and time.      Cranial Nerves: No cranial nerve deficit.      Motor: No weakness.      Coordination: Coordination normal.      Gait: Gait normal.   Psychiatric:         Mood and Affect: Mood normal.         Behavior: Behavior normal.         Thought Content: Thought content normal.         Judgment: Judgment normal.                         Assessment and Plan   Diagnoses and all orders for this visit:    1. Encounter for general adult medical examination with abnormal findings (Primary)  Patient has been erroneously marked as diabetic. Based on the available clinical  information, she does not have diabetes and should therefore be excluded from diabetic health maintenance and quality measures for the remainder of the reporting period.  Patient actually had steroid-induced diabetes between the ages of 67 and 68 when she was being treated with higher dose prednisone for rheumatoid arthritis, but since being taken off of her prednisone, she has remained in the prediabetes range.  This--the encounter for general adult medical examination with abnormal findings-- is the diagnosis used for the wellness portion of the visit.  2. Prediabetes  -     Hemoglobin A1c  The details above, patient no longer diabetic after taken off of prednisone 10 years ago  3. Essential hypertension, benign    4. Mixed hyperlipidemia  -     Lipid Panel    5. Encounter for long-term (current) use of other medications  -     CBC & Differential  -     Comprehensive Metabolic Panel    6. Gastroesophageal reflux disease without esophagitis    7. Carter's esophagus without dysplasia  Well-controlled with long-term PPI therapy, last upper endoscopy March 2023, due again March 2026 per GI  8. Other fatigue  -     TSH  This has been better but we will check her thyroid test as a precaution  9. B12 deficiency  -     Vitamin B12  -     Folate    10. Vitamin D deficiency  -     POC Microalbumin    11. Recurrent major depression in full remission  This continues to be well-controlled with Cymbalta 60 mg daily she will continue, since it does help with her chronic pain and neuropathy symptoms   12. History of steroid-induced diabetes mellitus    13. History of diabetic gastroparesis  This was shown on previous gastric emptying study, and while the patient is no longer diabetic, the episodes of cyclical vomiting she had over the past year were suggestive of a recurrence of this, she says lately the symptoms have resolved, but if they recur again she will follow-up with GI  14. Spinal stenosis of lumbar region without  neurogenic claudication    15. Rheumatoid arthritis involving multiple sites with positive rheumatoid factor  Managed by rheumatology and well-controlled  16. Irritable bowel syndrome with diarrhea    17. Idiopathic peripheral neuropathy  This was originally labeled as secondary to diabetes, but since the patient is not diabetic, and appears to be idiopathic.  Nonetheless, her symptoms are well-controlled with gabapentin and she has no worrisome lesions on her feet  18. Nonrheumatic aortic valve insufficiency  Patient has been followed by cardiology and no interventions have been needed.  I did not get the most recent cardiology follow-up note, but I will inform the patient that prophylactic daily aspirin is no longer recommended for primary prevention, and she can choose whether or not to stay on the 81 mg of aspirin daily.           Follow Up   Return in about 6 months (around 11/13/2024) for Recheck.  Patient was given instructions and counseling regarding her condition or for health maintenance advice. Please see specific information pulled into the AVS if appropriate.

## 2024-05-14 LAB
ALBUMIN SERPL-MCNC: 3.9 G/DL (ref 3.8–4.8)
ALBUMIN/GLOB SERPL: 1.7 {RATIO} (ref 1.2–2.2)
ALP SERPL-CCNC: 91 IU/L (ref 44–121)
ALT SERPL-CCNC: 14 IU/L (ref 0–32)
AST SERPL-CCNC: 25 IU/L (ref 0–40)
BASOPHILS # BLD AUTO: 0 X10E3/UL (ref 0–0.2)
BASOPHILS NFR BLD AUTO: 0 %
BILIRUB SERPL-MCNC: 0.3 MG/DL (ref 0–1.2)
BUN SERPL-MCNC: 20 MG/DL (ref 8–27)
BUN/CREAT SERPL: 36 (ref 12–28)
CALCIUM SERPL-MCNC: 9.6 MG/DL (ref 8.7–10.3)
CHLORIDE SERPL-SCNC: 104 MMOL/L (ref 96–106)
CHOLEST SERPL-MCNC: 159 MG/DL (ref 100–199)
CO2 SERPL-SCNC: 23 MMOL/L (ref 20–29)
CREAT SERPL-MCNC: 0.56 MG/DL (ref 0.57–1)
EGFRCR SERPLBLD CKD-EPI 2021: 95 ML/MIN/1.73
EOSINOPHIL # BLD AUTO: 0.3 X10E3/UL (ref 0–0.4)
EOSINOPHIL NFR BLD AUTO: 4 %
ERYTHROCYTE [DISTWIDTH] IN BLOOD BY AUTOMATED COUNT: 12.9 % (ref 11.7–15.4)
FOLATE SERPL-MCNC: >20 NG/ML
GLOBULIN SER CALC-MCNC: 2.3 G/DL (ref 1.5–4.5)
GLUCOSE SERPL-MCNC: 89 MG/DL (ref 70–99)
HBA1C MFR BLD: 5.7 % (ref 4.8–5.6)
HCT VFR BLD AUTO: 38.9 % (ref 34–46.6)
HDLC SERPL-MCNC: 45 MG/DL
HGB BLD-MCNC: 13.1 G/DL (ref 11.1–15.9)
IMM GRANULOCYTES # BLD AUTO: 0 X10E3/UL (ref 0–0.1)
IMM GRANULOCYTES NFR BLD AUTO: 0 %
LDLC SERPL CALC-MCNC: 69 MG/DL (ref 0–99)
LYMPHOCYTES # BLD AUTO: 2.4 X10E3/UL (ref 0.7–3.1)
LYMPHOCYTES NFR BLD AUTO: 34 %
MCH RBC QN AUTO: 29.6 PG (ref 26.6–33)
MCHC RBC AUTO-ENTMCNC: 33.7 G/DL (ref 31.5–35.7)
MCV RBC AUTO: 88 FL (ref 79–97)
MONOCYTES # BLD AUTO: 1 X10E3/UL (ref 0.1–0.9)
MONOCYTES NFR BLD AUTO: 13 %
NEUTROPHILS # BLD AUTO: 3.4 X10E3/UL (ref 1.4–7)
NEUTROPHILS NFR BLD AUTO: 49 %
PLATELET # BLD AUTO: 298 X10E3/UL (ref 150–450)
POTASSIUM SERPL-SCNC: 4 MMOL/L (ref 3.5–5.2)
PROT SERPL-MCNC: 6.2 G/DL (ref 6–8.5)
RBC # BLD AUTO: 4.43 X10E6/UL (ref 3.77–5.28)
SODIUM SERPL-SCNC: 142 MMOL/L (ref 134–144)
TRIGL SERPL-MCNC: 280 MG/DL (ref 0–149)
TSH SERPL DL<=0.005 MIU/L-ACNC: 1.87 UIU/ML (ref 0.45–4.5)
VIT B12 SERPL-MCNC: 584 PG/ML (ref 232–1245)
VLDLC SERPL CALC-MCNC: 45 MG/DL (ref 5–40)
WBC # BLD AUTO: 7.2 X10E3/UL (ref 3.4–10.8)

## 2024-05-14 NOTE — TELEPHONE ENCOUNTER
I am being a little nit-picky here, but [place] [facility] [place], and we never got pathology report from biopsy. Can you please obtain? Thanks       Being faxed over Dr. Osborn

## 2024-07-18 ENCOUNTER — TELEPHONE (OUTPATIENT)
Dept: GASTROENTEROLOGY | Facility: CLINIC | Age: 79
End: 2024-07-18
Payer: MEDICARE

## 2024-08-01 ENCOUNTER — OFFICE VISIT (OUTPATIENT)
Dept: GASTROENTEROLOGY | Facility: CLINIC | Age: 79
End: 2024-08-01
Payer: MEDICARE

## 2024-08-01 VITALS
WEIGHT: 140 LBS | HEART RATE: 71 BPM | DIASTOLIC BLOOD PRESSURE: 62 MMHG | OXYGEN SATURATION: 98 % | HEIGHT: 60 IN | TEMPERATURE: 98 F | BODY MASS INDEX: 27.48 KG/M2 | SYSTOLIC BLOOD PRESSURE: 128 MMHG

## 2024-08-01 DIAGNOSIS — K31.84 GASTROPARESIS: Primary | ICD-10-CM

## 2024-08-01 DIAGNOSIS — R68.81 EARLY SATIETY: ICD-10-CM

## 2024-08-01 DIAGNOSIS — R10.13 EPIGASTRIC PAIN: ICD-10-CM

## 2024-08-01 DIAGNOSIS — K21.9 GASTROESOPHAGEAL REFLUX DISEASE, UNSPECIFIED WHETHER ESOPHAGITIS PRESENT: ICD-10-CM

## 2024-08-01 RX ORDER — ESTRADIOL 0.1 MG/G
CREAM VAGINAL
COMMUNITY
Start: 2024-04-26

## 2024-08-01 RX ORDER — METOCLOPRAMIDE 10 MG/1
10 TABLET ORAL 4 TIMES DAILY PRN
Qty: 120 TABLET | Refills: 0 | Status: SHIPPED | OUTPATIENT
Start: 2024-08-01 | End: 2024-08-31

## 2024-08-01 NOTE — PROGRESS NOTES
Follow Up      Patient Name: Mady Pichardo  : 1945   MRN: [id number]     Chief Complaint:    Chief Complaint   Patient presents with    Abdominal Pain    Vomiting    Follow-up       History of Present Illness: Mady Pichardo is a 79 y.o. female who is here today for follow up on gastroparesis.     Mady voices frustration with her ongoing symptoms as she is vomiting almost every day.  In fact, reports she vomited just this morning after eating toast.  Emesis described as being old undigested food and is typically what ever meal she ate prior to the next.  Reports that she is trying to alter her diet to accommodate these issues but is struggling with weight loss.  Reports having ongoing epigastric pain following meals.  In terms of management, patient has not been restricting diet.  Has been using gingerroot twice a day per her recollection.  In terms of prior workup that needs to be completed, has not completed gastric emptying scan though it is tentatively scheduled for next month.  Only intermittently uses Voltaren and no other NSAIDs.  Anticoagulated.    At time of exam, patient does not report any ongoing nausea or vomiting.  Denies any abdominal pain.  No shortness of breath, chest pain, or fever/chills.  Home medications reviewed for accuracy. Past medical, surgical, social, and family histories are reviewed for accuracy.  No documented alleviating or exacerbating factors.  Does not endorse pain at time of exam.    Subjective      Review of Systems:   Review of Systems   Constitutional:  Positive for activity change, appetite change and unexpected weight loss. Negative for chills, diaphoresis, fatigue, fever and unexpected weight gain.   HENT:  Negative for sore throat, trouble swallowing and voice change.    Eyes: Negative.    Respiratory:  Negative for apnea, cough, choking, chest tightness, shortness of breath, wheezing and stridor.    Cardiovascular:  Negative for chest pain, palpitations and leg  swelling.   Gastrointestinal:  Positive for abdominal pain, nausea, vomiting, GERD and indigestion. Negative for abdominal distention, anal bleeding, blood in stool, constipation, diarrhea and rectal pain.   Endocrine: Negative.    Genitourinary: Negative.    Musculoskeletal: Negative.    Skin: Negative.    Allergic/Immunologic: Negative.    Neurological: Negative.    Hematological: Negative.    Psychiatric/Behavioral: Negative.     All other systems reviewed and are negative.      Medications:     Current Outpatient Medications:     amLODIPine (NORVASC) 5 MG tablet, Take 1 tablet by mouth Daily., Disp: 90 tablet, Rfl: 3    ammonium lactate (AMLACTIN) 12 % cream, , Disp: , Rfl:     aspirin 81 MG chewable tablet, Chew 1 tablet Daily., Disp: , Rfl:     dexlansoprazole (DEXILANT) 60 MG capsule, Take 1 capsule by mouth Daily., Disp: 90 capsule, Rfl: 3    Diclofenac Sodium (VOLTAREN) 1 % gel gel, , Disp: , Rfl:     DULoxetine (CYMBALTA) 60 MG capsule, Take 1 capsule by mouth Daily., Disp: 90 capsule, Rfl: 3    Enbrel Mini 50 MG/ML solution cartridge, Inject 50 mg under the skin into the appropriate area as directed Every 7 (Seven) Days., Disp: , Rfl:     estradiol (ESTRACE) 0.1 MG/GM vaginal cream, , Disp: , Rfl:     Estrogens Conjugated (Premarin) 0.625 MG/GM vaginal cream, Premarin 0.625 mg/gram vaginal cream, Disp: , Rfl:     gabapentin (NEURONTIN) 100 MG capsule, Take 2 capsules by mouth Daily., Disp: , Rfl:     hydroxychloroquine (PLAQUENIL) 200 MG tablet, Take 1 tablet by mouth 2 (Two) Times a Day., Disp: , Rfl:     metoprolol succinate XL (TOPROL-XL) 100 MG 24 hr tablet, Take 1 tablet by mouth Daily., Disp: 90 tablet, Rfl: 3    ondansetron ODT (ZOFRAN-ODT) 4 MG disintegrating tablet, Place 1 tablet on the tongue Every 8 (Eight) Hours As Needed for Nausea or Vomiting., Disp: 20 tablet, Rfl: 0    pravastatin (PRAVACHOL) 20 MG tablet, Take 1 tablet by mouth Daily., Disp: 90 tablet, Rfl: 3    tiZANidine (ZANAFLEX) 4  MG tablet, Take 1 tablet by mouth every night at bedtime., Disp: 90 tablet, Rfl: 3    traMADol (ULTRAM) 50 MG tablet, Take 1 tablet by mouth Every 6 (Six) Hours As Needed for Moderate Pain., Disp: 360 tablet, Rfl: 1    valsartan-hydrochlorothiazide (DIOVAN-HCT) 320-25 MG per tablet, Take 1 tablet by mouth Daily., Disp: 90 tablet, Rfl: 3    Allergies:   Allergies   Allergen Reactions    Myrbetriq [Mirabegron] Nausea And Vomiting    Erythromycin GI Intolerance    Meloxicam GI Bleeding    Metronidazole GI Intolerance    Cerave [Albolene] Rash       Social History:   Social History     Socioeconomic History    Marital status:    Tobacco Use    Smoking status: Former     Current packs/day: 0.00     Average packs/day: 1 pack/day for 20.0 years (20.0 ttl pk-yrs)     Types: Cigarettes     Start date:      Quit date:      Years since quittin.6     Passive exposure: Never    Smokeless tobacco: Never   Vaping Use    Vaping status: Never Used   Substance and Sexual Activity    Alcohol use: Not Currently    Drug use: Never    Sexual activity: Defer        Surgical History:   Past Surgical History:   Procedure Laterality Date    BUNIONECTOMY Bilateral     COLONOSCOPY      CYSTOSCOPY      DILATATION AND CURETTAGE      ENDOSCOPY      WITH BIOPSY ( ,, , 2011)    ENDOSCOPY  2023    Carter's, repeat due in 3yrs per GI    HERNIA REPAIR      HYSTERECTOMY      KNEE ARTHROSCOPY      CAZARES'S NEUROMA EXCISION Left     ALSO 2010    TONSILLECTOMY AND ADENOIDECTOMY      TUBAL ABDOMINAL LIGATION      UMBILICAL HERNIA REPAIR  10/2023    [facility] [place], Dr Francis, successful    UPPER GASTROINTESTINAL ENDOSCOPY          Medical History:   Past Medical History:   Diagnosis Date    Allergic rhinitis     Aortic insufficiency     on ECHO    Carter esophagus     EGD WITH BIOPSY      ,, , 2011, -repeat due in  per GI    Benign essential hypertension     Breast complaint   "   \"DIMPLE\" DX MMG AND ULTRASOUND, ALL NORMAL    Cataract     NOT IN NEED OF SURGERY YET    Class 1 obesity in adult     Diabetes mellitus, type 2 2013    This was actually steroid-induced, and resolved when she went off of steroids for her rheumatoid arthritis    Diabetic peripheral neuropathy associated with type 2 diabetes mellitus     Diverticulitis 2005    IV ATBX - IMPROVED    Diverticulosis of large intestine without hemorrhage 2011    Edema     WITH VENOUS INSUFFICIENCY    Fatigue     Fibromyalgia     Gastroesophageal reflux disease without esophagitis     Gastroparesis     Seen on gastric emptying study, symptoms have waxed and waned over the years    Hammer toe     Herpes simplex     GENITAL  - CONTROLLED W/ CHRONIC FAMVIR    Hiatal hernia with gastroesophageal reflux     High risk medication use     Irritable bowel syndrome     Irritable bowel syndrome with constipation     Left retinal detachment 2010    LASER TREATMENT, SUCCESSFUL    Microscopic hematuria 1995    IVP/CYSTOSCOPY    Mixed hyperlipidemia     Lizama neuroma, left 2010    2ND    Lizama's neuroma of both feet 2007    INJECTION, FAILED, SURGERY PLANNED    Lizama's neuroma of left foot 2008    NEUROMA SURGICALLY REMOVED, LEFT FOOT    Onychomycosis of left great toe     Plantar fasciitis     Recurrent major depression in partial remission     Rheumatoid arthritis involving multiple joints     Tear of meniscus of left knee 2008    Urinary incontinence 2022    Bladder stimulator implanted and doing well    Vitamin D deficiency         Objective     Physical Exam:  Vital Signs:   Vitals:    08/01/24 1452   BP: 128/62   BP Location: Right arm   Patient Position: Sitting   Cuff Size: Adult   Pulse: 71   Temp: 98 °F (36.7 °C)   TempSrc: Temporal   SpO2: 98%   Weight: 63.5 kg (140 lb)   Height: 152.4 cm (60\")   PainSc:   3   PainLoc: Abdomen     Body mass index is 27.34 kg/m².     Physical Exam  Vitals and nursing note reviewed.   Constitutional:       " General: She is not in acute distress.     Appearance: Normal appearance. She is normal weight. She is not ill-appearing or toxic-appearing.   HENT:      Head: Normocephalic and atraumatic.   Eyes:      General: No scleral icterus.     Extraocular Movements: Extraocular movements intact.      Conjunctiva/sclera: Conjunctivae normal.      Pupils: Pupils are equal, round, and reactive to light.   Cardiovascular:      Rate and Rhythm: Normal rate and regular rhythm.      Heart sounds: Murmur heard.   Pulmonary:      Effort: Pulmonary effort is normal. No respiratory distress.      Breath sounds: Normal breath sounds.   Abdominal:      General: Abdomen is flat. Bowel sounds are normal. There is no distension.      Palpations: Abdomen is soft. There is no mass.      Tenderness: There is no abdominal tenderness. There is no guarding or rebound.      Hernia: No hernia is present.   Genitourinary:     Comments: defer  Musculoskeletal:         General: Normal range of motion.      Right lower leg: No edema.      Left lower leg: No edema.   Skin:     General: Skin is warm and dry.      Capillary Refill: Capillary refill takes less than 2 seconds.      Coloration: Skin is not jaundiced or pale.   Neurological:      General: No focal deficit present.      Mental Status: She is alert and oriented to person, place, and time.   Psychiatric:         Mood and Affect: Mood normal.         Behavior: Behavior normal.         Thought Content: Thought content normal.         Judgment: Judgment normal.       I have personally reviewed most recent lab results and radiology images and interpretations and agree with findings.    Assessment / Plan      Assessment/Plan:   1.  Gastroparesis, ?  History of cyclic vomiting  2.  Gastroesophageal reflux disease, presence of esophagitis not specified  3.  Carter's esophagus, dysplasia unspecified    Mady Pichardo is a 79-year-old female who presents to GI clinic for follow-up regarding her ongoing  nausea and vomiting.  Symptoms strongly concerning for gastroparesis as they are exacerbated by oral intake and patient endorses regurgitating undigested food products from prior days meals.  I do see in the chart review require a 4-hour gastric emptying scan has been ordered though it has not been scheduled as of yet.  Will plan to obtain GES for definitive evaluation of gastric emptying.  In the interim, will continue PPI, gingerroot, and patient provided with prescription for metoclopramide for symptom relief.    >>> Obtain 4-hour gastric emptying scan  >>> Continue PPI  >>> Recommend gingerroot 500 mg p.o. 3 times daily with meals  >>> Prescription placed for metoclopramide 10 mg p.o. 4 times daily as needed for nausea and vomiting.  Discussed risk associated with long-term use of metoclopramide; patient voiced understanding of use and importance of using sparingly.    I had a very long conversation with Mady regarding the diagnosis of gastroparesis and our management plan going forward. Gastroparesis diet handout provided.     GASTROPARESIS DIET  - Eat 4-6 small meals per day rather than 3 regular or large meals.   - Eat foods low in fat as fat takes longer to digest.   - Eat low fiber diet of < 13 grams fiber per day. Fiber delays stomach emptying.   - Some foods to avoid are raw vegetables, dried beans, all fresh fruits except bananas and melons, caffeine, processed meats, whole grains, seeds, and nuts.  - Chew foods well.  - Avoid acidic and spicy foods.  - Minimize carbonated beverages as may aggravate gastric distention.  - Stay hydrated. Drink fluids throughout meal, and sit upright or walk after meals.  - Recommend MVI.   - Consider liquid diet supplements.   - Avoid alcohol and tobacco use as can modify gastric emptying.  - Strict glucose control for diabetics as improvement in hyperglycemia can accelerate gastric emptying.      Follow Up:   Patient to return to clinic in 2 months or sooner following  the completion of the gastric emptying scan.    Plan of care reviewed with the patient at the conclusion of today's visit.  Education was provided regarding diagnosis, management, and any prescribed or recommended OTC medications.  Patient verbalized understanding of and agreement with management plan.     Time Statement:   Discussed plan of care in detail with patient today. Patient verbally understands and agrees. I have spent 40 minutes reviewing available diagnostics, obtaining history, examining the patient, developing a treatment plan, and educating the patient on disease process and plan of care.     Alber Franks, JOSUE, APRN, AGACNP-BC.  08/01/2024  19:05 EDT

## 2024-10-15 ENCOUNTER — OFFICE VISIT (OUTPATIENT)
Dept: GASTROENTEROLOGY | Facility: CLINIC | Age: 79
End: 2024-10-15
Payer: MEDICARE

## 2024-10-15 VITALS
HEART RATE: 55 BPM | OXYGEN SATURATION: 98 % | SYSTOLIC BLOOD PRESSURE: 124 MMHG | HEIGHT: 60 IN | BODY MASS INDEX: 27.09 KG/M2 | WEIGHT: 138 LBS | DIASTOLIC BLOOD PRESSURE: 60 MMHG | TEMPERATURE: 98.1 F

## 2024-10-15 DIAGNOSIS — R11.2 NAUSEA AND VOMITING, UNSPECIFIED VOMITING TYPE: ICD-10-CM

## 2024-10-15 DIAGNOSIS — K31.84 GASTROPARESIS: Primary | ICD-10-CM

## 2024-10-15 RX ORDER — PROMETHAZINE HYDROCHLORIDE 25 MG/1
25 SUPPOSITORY RECTAL EVERY 6 HOURS PRN
Qty: 30 SUPPOSITORY | Refills: 1 | Status: SHIPPED | OUTPATIENT
Start: 2024-10-15

## 2024-10-15 RX ORDER — HYDROCODONE BITARTRATE AND ACETAMINOPHEN 5; 325 MG/1; MG/1
TABLET ORAL
COMMUNITY
Start: 2024-08-13

## 2024-10-15 RX ORDER — ONDANSETRON 4 MG/1
4 TABLET, ORALLY DISINTEGRATING ORAL EVERY 8 HOURS PRN
Qty: 20 TABLET | Refills: 0 | Status: SHIPPED | OUTPATIENT
Start: 2024-10-15

## 2024-10-15 NOTE — PROGRESS NOTES
Follow Up      Patient Name: Mady Pichardo  : 1945   MRN: [id number]     Chief Complaint:    Chief Complaint   Patient presents with    Follow-up       History of Present Illness: Mady Pichardo is a 79 y.o. female who is here today for follow up on gastroparesis.     Pt has not obtained 4h GES study. She states that she had one done within the last few years ago [facility] [place] that was abnormal, data deficient at time of exam. She has had some improvement with nausea sx with use of reglan 10mg daily. She has had a few episodes of persistent vomiting since last visit, for which she uses zofran, phenergan, etc. She continues to take hema root 500mg BID.     GERD continues to be well controlled with Dexilant 60mg daily.     Patient denies associated fever, chills, abdominal pain, indigestion, diarrhea, constipation, hematemesis, dysphagia, hematochezia, melena, bloating, weight loss or gain, dysuria, jaundice or bruising.    CT A/P w/wo contrast 10/2023: No acute process is seen. 1.8cm subumbilical nonspecific subcutaneous fluid collection.      EGD 3/2023 with Dr. Osborn at [facility]. Z-line irregular at 34cm. 4cm hiatal hernia. Esophageal mucosal changes consistent with short-segment Carter's esophagus at GEJ, 1cm in length. Normal esophagus otherwise. Diffuse moderate gastritis. Normal duodenum. Recommend repeat EGD in 3 years. Pathology not available at time of dictation.      CSY few years ago with significant diverticulosis. No polyps, no further CSY recommended.     Subjective      Review of Systems:   Review of Systems   Constitutional:  Negative for appetite change, chills, diaphoresis, fatigue, fever, unexpected weight gain and unexpected weight loss.   HENT:  Negative for drooling, facial swelling, mouth sores, rhinorrhea, sore throat, tinnitus, trouble swallowing and voice change.    Eyes: Negative.    Respiratory:  Negative for cough, chest tightness and shortness of breath.     Cardiovascular:  Negative for chest pain.   Gastrointestinal:  Positive for nausea and vomiting. Negative for abdominal pain, blood in stool, constipation, diarrhea, GERD and indigestion.   Genitourinary:  Negative for dysuria, flank pain, hematuria and pelvic pain.   Musculoskeletal:  Negative for arthralgias and myalgias.   Skin:  Negative for color change, pallor and rash.   Neurological:  Negative for dizziness, tremors, syncope, weakness and numbness.   Psychiatric/Behavioral:  Negative for hallucinations and sleep disturbance. The patient is not nervous/anxious.    All other systems reviewed and are negative.      Medications:     Current Outpatient Medications:     HYDROcodone-acetaminophen (NORCO) 5-325 MG per tablet, , Disp: , Rfl:     amLODIPine (NORVASC) 5 MG tablet, Take 1 tablet by mouth Daily., Disp: 90 tablet, Rfl: 3    ammonium lactate (AMLACTIN) 12 % cream, , Disp: , Rfl:     aspirin 81 MG chewable tablet, Chew 1 tablet Daily., Disp: , Rfl:     dexlansoprazole (DEXILANT) 60 MG capsule, Take 1 capsule by mouth Daily., Disp: 90 capsule, Rfl: 3    Diclofenac Sodium (VOLTAREN) 1 % gel gel, , Disp: , Rfl:     DULoxetine (CYMBALTA) 60 MG capsule, Take 1 capsule by mouth Daily., Disp: 90 capsule, Rfl: 3    Enbrel Mini 50 MG/ML solution cartridge, Inject 50 mg under the skin into the appropriate area as directed Every 7 (Seven) Days., Disp: , Rfl:     estradiol (ESTRACE) 0.1 MG/GM vaginal cream, , Disp: , Rfl:     Estrogens Conjugated (Premarin) 0.625 MG/GM vaginal cream, Premarin 0.625 mg/gram vaginal cream, Disp: , Rfl:     gabapentin (NEURONTIN) 100 MG capsule, Take 2 capsules by mouth Daily., Disp: , Rfl:     hydroxychloroquine (PLAQUENIL) 200 MG tablet, Take 1 tablet by mouth 2 (Two) Times a Day., Disp: , Rfl:     metoprolol succinate XL (TOPROL-XL) 100 MG 24 hr tablet, Take 1 tablet by mouth Daily., Disp: 90 tablet, Rfl: 3    ondansetron ODT (ZOFRAN-ODT) 4 MG disintegrating tablet, Place 1 tablet on  the tongue Every 8 (Eight) Hours As Needed for Nausea or Vomiting., Disp: 20 tablet, Rfl: 0    pravastatin (PRAVACHOL) 20 MG tablet, Take 1 tablet by mouth Daily., Disp: 90 tablet, Rfl: 3    tiZANidine (ZANAFLEX) 4 MG tablet, Take 1 tablet by mouth every night at bedtime., Disp: 90 tablet, Rfl: 3    traMADol (ULTRAM) 50 MG tablet, Take 1 tablet by mouth Every 6 (Six) Hours As Needed for Moderate Pain., Disp: 360 tablet, Rfl: 1    valsartan-hydrochlorothiazide (DIOVAN-HCT) 320-25 MG per tablet, Take 1 tablet by mouth Daily., Disp: 90 tablet, Rfl: 3    Allergies:   Allergies   Allergen Reactions    Myrbetriq [Mirabegron] Nausea And Vomiting    Erythromycin GI Intolerance    Meloxicam GI Bleeding    Metronidazole GI Intolerance    Cerave [Albolene] Rash       Social History:   Social History     Socioeconomic History    Marital status:    Tobacco Use    Smoking status: Former     Current packs/day: 0.00     Average packs/day: 1 pack/day for 20.0 years (20.0 ttl pk-yrs)     Types: Cigarettes     Start date:      Quit date:      Years since quittin.8     Passive exposure: Never    Smokeless tobacco: Never   Vaping Use    Vaping status: Never Used   Substance and Sexual Activity    Alcohol use: Not Currently    Drug use: Never    Sexual activity: Defer        Surgical History:   Past Surgical History:   Procedure Laterality Date    BUNIONECTOMY Bilateral     COLONOSCOPY      CYSTOSCOPY      DILATATION AND CURETTAGE      ENDOSCOPY      WITH BIOPSY ( ,, -, 2011)    ENDOSCOPY  2023    Carter's, repeat due in 3yrs per GI    HERNIA REPAIR      HYSTERECTOMY      KNEE ARTHROSCOPY      CAZARES'S NEUROMA EXCISION Left     ALSO 2010    TONSILLECTOMY AND ADENOIDECTOMY      TUBAL ABDOMINAL LIGATION      UMBILICAL HERNIA REPAIR  10/2023    [facility] [place], Dr Francis, successful    UPPER GASTROINTESTINAL ENDOSCOPY          Medical History:   Past Medical History:   Diagnosis Date    Allergic  "rhinitis     Aortic insufficiency 2022    on ECHO    Carter esophagus 2006    EGD WITH BIOPSY      2008,2006, 4-2009, 11/2011, 2023-repeat due in 2026 per GI    Benign essential hypertension     Breast complaint 2008    \"DIMPLE\" DX MMG AND ULTRASOUND, ALL NORMAL    Cataract     NOT IN NEED OF SURGERY YET    Class 1 obesity in adult     Diabetes mellitus, type 2 2013    This was actually steroid-induced, and resolved when she went off of steroids for her rheumatoid arthritis    Diabetic peripheral neuropathy associated with type 2 diabetes mellitus     Diverticulitis 2005    IV ATBX - IMPROVED    Diverticulosis of large intestine without hemorrhage 2011    Edema     WITH VENOUS INSUFFICIENCY    Fatigue     Fibromyalgia     Gastroesophageal reflux disease without esophagitis     Gastroparesis     Seen on gastric emptying study, symptoms have waxed and waned over the years    Hammer toe     Herpes simplex     GENITAL  - CONTROLLED W/ CHRONIC FAMVIR    Hiatal hernia with gastroesophageal reflux     High risk medication use     Irritable bowel syndrome     Irritable bowel syndrome with constipation     Left retinal detachment 2010    LASER TREATMENT, SUCCESSFUL    Microscopic hematuria 1995    IVP/CYSTOSCOPY    Mixed hyperlipidemia     Lizama neuroma, left 2010    2ND    Lizama's neuroma of both feet 2007    INJECTION, FAILED, SURGERY PLANNED    Lizama's neuroma of left foot 2008    NEUROMA SURGICALLY REMOVED, LEFT FOOT    Onychomycosis of left great toe     Plantar fasciitis     Recurrent major depression in partial remission     Rheumatoid arthritis involving multiple joints     Tear of meniscus of left knee 2008    Urinary incontinence 2022    Bladder stimulator implanted and doing well    Vitamin D deficiency         Objective     Physical Exam:  Vital Signs:   Vitals:    10/15/24 1300   Height: 152.4 cm (60\")     Body mass index is 27.34 kg/m².     Physical Exam  Vitals and nursing note reviewed.   Constitutional:  "      Appearance: Normal appearance. She is normal weight. She is not ill-appearing or diaphoretic.   HENT:      Head: Normocephalic and atraumatic.      Right Ear: External ear normal.      Left Ear: External ear normal.      Nose: Nose normal.      Mouth/Throat:      Mouth: Mucous membranes are moist.      Pharynx: Oropharynx is clear.   Eyes:      Conjunctiva/sclera: Conjunctivae normal.      Pupils: Pupils are equal, round, and reactive to light.   Neck:      Thyroid: No thyromegaly.   Cardiovascular:      Rate and Rhythm: Normal rate and regular rhythm.      Pulses: Normal pulses.      Heart sounds: Normal heart sounds.   Pulmonary:      Effort: Pulmonary effort is normal.      Breath sounds: Normal breath sounds.   Abdominal:      General: Abdomen is flat. Bowel sounds are normal. There is no distension.      Tenderness: There is no abdominal tenderness.   Musculoskeletal:         General: Normal range of motion.      Cervical back: Normal range of motion and neck supple.   Skin:     General: Skin is warm and dry.   Neurological:      General: No focal deficit present.      Mental Status: She is oriented to person, place, and time.   Psychiatric:         Mood and Affect: Mood normal.         Assessment / Plan      Assessment/Plan:   There are no diagnoses linked to this encounter.      GERD  Carter's esophagus, dysplasia unspecified  Cyclical vomiting / gastroparesis  H/o delayed gastric emptying via GES in past   - continue all medications as prescribed, continue reglan 10mg daily, hema root 500mg BID   - recommend continued adequate oral hydration, start soluble fiber supplement daily   - pt given GERD diet instructions, advised to avoid GI irritants such as caffeine, carbonation, EtOH, tobacco, chocolate, peppermint, acid-based foods   - previous imaging, endoscopy reports reviewed, will request pathology from most recent EGD   - [place] [facility] [place]   - follow up in clinic after  completion of above studies   - call clinic at any time for questions or new / worsened sx    Follow Up:   Return in about 3 months (around 1/15/2025).    Plan of care reviewed with the patient at the conclusion of today's visit.  Education was provided regarding diagnosis, management, and any prescribed or recommended OTC medications.  Patient verbalized understanding of and agreement with management plan.     NOTE TO PATIENT: The 21st Century Cures Act makes medical notes like these available to patients in the interest of transparency. However, be advised this is a medical document. It is intended as peer to peer communication. It is written in medical language and may contain abbreviations or verbiage that are unfamiliar. It may appear blunt or direct. Medical documents are intended to carry relevant information, facts as evident, and the clinical opinion of the practitioner.     Time Statement:   Discussed plan of care in detail with patient today. Patient verbally understands and agrees. I have spent 30 minutes reviewing available diagnostics, obtaining history, examining the patient, developing a treatment plan, and educating the patient on disease process and plan of care.     Nita Riley PA-C   [facility]

## 2024-10-16 ENCOUNTER — PRIOR AUTHORIZATION (OUTPATIENT)
Dept: GASTROENTEROLOGY | Facility: CLINIC | Age: 79
End: 2024-10-16
Payer: MEDICARE

## 2024-11-07 ENCOUNTER — TELEPHONE (OUTPATIENT)
Dept: UROLOGY | Facility: CLINIC | Age: 79
End: 2024-11-07
Payer: MEDICARE

## 2024-11-07 NOTE — TELEPHONE ENCOUNTER
Spoke with patient and gave her rajesh (Squirrly) number and scheduled her with  after her appt with obgyn in December.

## 2024-11-07 NOTE — TELEPHONE ENCOUNTER
PT called and stated she doesn't think her [employer] box is working. During the day she is fine but at night after laying down she doesn't have time to make it to the restroom. Leaking urine really bad and goes 4-5 times per night. I was going to just schedule a FU with  and rep but at the end of the call PT stated she is bleeding everyday for a long time now and wasn't to sure but mentioned being referred to [facility]. Please advise scheduling and PT asked for a follow up phone call.

## 2025-01-13 ENCOUNTER — OFFICE VISIT (OUTPATIENT)
Dept: FAMILY MEDICINE CLINIC | Facility: CLINIC | Age: 80
End: 2025-01-13
Payer: MEDICARE

## 2025-01-13 VITALS
HEART RATE: 57 BPM | BODY MASS INDEX: 27.07 KG/M2 | DIASTOLIC BLOOD PRESSURE: 78 MMHG | HEIGHT: 60 IN | WEIGHT: 137.9 LBS | OXYGEN SATURATION: 98 % | SYSTOLIC BLOOD PRESSURE: 120 MMHG

## 2025-01-13 DIAGNOSIS — M48.061 SPINAL STENOSIS OF LUMBAR REGION WITHOUT NEUROGENIC CLAUDICATION: ICD-10-CM

## 2025-01-13 DIAGNOSIS — K21.9 GASTROESOPHAGEAL REFLUX DISEASE WITHOUT ESOPHAGITIS: ICD-10-CM

## 2025-01-13 DIAGNOSIS — M05.79 RHEUMATOID ARTHRITIS INVOLVING MULTIPLE SITES WITH POSITIVE RHEUMATOID FACTOR: ICD-10-CM

## 2025-01-13 DIAGNOSIS — E78.2 MIXED HYPERLIPIDEMIA: ICD-10-CM

## 2025-01-13 DIAGNOSIS — R73.03 PREDIABETES: ICD-10-CM

## 2025-01-13 DIAGNOSIS — Z79.899 ENCOUNTER FOR LONG-TERM (CURRENT) USE OF HIGH-RISK MEDICATION: ICD-10-CM

## 2025-01-13 DIAGNOSIS — K22.70 BARRETT'S ESOPHAGUS WITHOUT DYSPLASIA: ICD-10-CM

## 2025-01-13 DIAGNOSIS — M43.16 SPONDYLOLISTHESIS AT L1-L2 LEVEL: ICD-10-CM

## 2025-01-13 DIAGNOSIS — K58.0 IRRITABLE BOWEL SYNDROME WITH DIARRHEA: ICD-10-CM

## 2025-01-13 DIAGNOSIS — I10 ESSENTIAL HYPERTENSION, BENIGN: Primary | ICD-10-CM

## 2025-01-13 DIAGNOSIS — I35.1 NONRHEUMATIC AORTIC VALVE INSUFFICIENCY: ICD-10-CM

## 2025-01-13 DIAGNOSIS — F33.42 RECURRENT MAJOR DEPRESSION IN FULL REMISSION: ICD-10-CM

## 2025-01-13 PROCEDURE — 1160F RVW MEDS BY RX/DR IN RCRD: CPT | Performed by: FAMILY MEDICINE

## 2025-01-13 PROCEDURE — 3078F DIAST BP <80 MM HG: CPT | Performed by: FAMILY MEDICINE

## 2025-01-13 PROCEDURE — 3074F SYST BP LT 130 MM HG: CPT | Performed by: FAMILY MEDICINE

## 2025-01-13 PROCEDURE — 99214 OFFICE O/P EST MOD 30 MIN: CPT | Performed by: FAMILY MEDICINE

## 2025-01-13 PROCEDURE — 1125F AMNT PAIN NOTED PAIN PRSNT: CPT | Performed by: FAMILY MEDICINE

## 2025-01-13 PROCEDURE — 1159F MED LIST DOCD IN RCRD: CPT | Performed by: FAMILY MEDICINE

## 2025-01-13 RX ORDER — TRAMADOL HYDROCHLORIDE 50 MG/1
50 TABLET ORAL EVERY 6 HOURS PRN
Qty: 360 TABLET | Refills: 1 | Status: SHIPPED | OUTPATIENT
Start: 2025-01-13

## 2025-01-13 RX ORDER — AMLODIPINE BESYLATE 5 MG/1
5 TABLET ORAL DAILY
Qty: 90 TABLET | Refills: 3 | Status: SHIPPED | OUTPATIENT
Start: 2025-01-13

## 2025-01-13 RX ORDER — VALSARTAN AND HYDROCHLOROTHIAZIDE 320; 25 MG/1; MG/1
1 TABLET, FILM COATED ORAL DAILY
Qty: 90 TABLET | Refills: 3 | Status: SHIPPED | OUTPATIENT
Start: 2025-01-13

## 2025-01-13 RX ORDER — DEXLANSOPRAZOLE 60 MG/1
1 CAPSULE, DELAYED RELEASE ORAL DAILY
Qty: 90 CAPSULE | Refills: 3 | Status: SHIPPED | OUTPATIENT
Start: 2025-01-13

## 2025-01-13 RX ORDER — PRAVASTATIN SODIUM 20 MG
20 TABLET ORAL DAILY
Qty: 90 TABLET | Refills: 3 | Status: SHIPPED | OUTPATIENT
Start: 2025-01-13

## 2025-01-13 RX ORDER — DULOXETIN HYDROCHLORIDE 60 MG/1
60 CAPSULE, DELAYED RELEASE ORAL DAILY
Qty: 90 CAPSULE | Refills: 3 | Status: SHIPPED | OUTPATIENT
Start: 2025-01-13

## 2025-01-13 RX ORDER — METOPROLOL SUCCINATE 100 MG/1
100 TABLET, EXTENDED RELEASE ORAL DAILY
Qty: 90 TABLET | Refills: 3 | Status: SHIPPED | OUTPATIENT
Start: 2025-01-13

## 2025-01-13 NOTE — PROGRESS NOTES
"Chief Complaint  Prediabetes and Hypertension    Subjective      Mady Pichardo presents to [facility]  History of Present Illness  Patient is here for 6-month follow-up on hypertension and other chronic issues.  She says that she and her  sold the farm and moved to a smaller home that has stairs in it.  As a result, she has had to go up and down stairs several times a day.  This is benefited her significantly, as she has felt much stronger in her legs and has not had any falls since I saw her last.  Her blood pressure has remained well-controlled, and her mood has been good.  She has been diagnosed with gastroparesis and will be following up with gastroenterology tomorrow.  The patient does still use tramadol occasionally for pain, as she cannot take NSAIDs due to history of upper GI bleed, and acetaminophen as often ineffective for her joint pain back pain and pain from rheumatoid arthritis.  However, the last prescription for tramadol that she filled was filled in December 2023, so she has used it sparingly and judiciously.  Objective   Vital Signs:   Vitals:    01/13/25 1257   BP: 120/78   BP Location: Left arm   Patient Position: Sitting   Cuff Size: Adult   Pulse: 57   SpO2: 98%   Weight: 62.6 kg (137 lb 14.4 oz)   Height: 152.4 cm (60\")      /78 (BP Location: Left arm, Patient Position: Sitting, Cuff Size: Adult)   Pulse 57   Ht 152.4 cm (60\")   Wt 62.6 kg (137 lb 14.4 oz)   SpO2 98%   BMI 26.93 kg/m²     Body mass index is 26.93 kg/m².    Review of Systems   Constitutional:  Negative for chills, fever and unexpected weight loss.   HENT:  Negative for ear discharge, ear pain, mouth sores, nosebleeds, rhinorrhea, sinus pressure, sore throat, swollen glands and trouble swallowing.    Eyes:  Negative for blurred vision, double vision, pain, redness and visual disturbance.   Respiratory:  Negative for cough, shortness of breath and wheezing.    Cardiovascular:  " Negative for chest pain, palpitations and leg swelling.        PND, orthopnea   Gastrointestinal:  Positive for nausea and vomiting. Negative for abdominal distention, abdominal pain, blood in stool, constipation, diarrhea and GERD.        Dysphagia, odynophagia   Endocrine: Negative for polydipsia, polyphagia and polyuria.   Genitourinary:  Negative for dysuria, hematuria and urinary incontinence.   Musculoskeletal:  Positive for arthralgias (unusual/atypica) and back pain. Negative for gait problem, joint swelling, myalgias and neck pain.   Skin:  Negative for rash, skin lesions (worrisome/suspicious) and bruise.   Allergic/Immunologic: Negative for food allergies.   Neurological:  Negative for dizziness, tremors, seizures, syncope, weakness, light-headedness, numbness and headache.   Hematological:  Negative for adenopathy. Does not bruise/bleed easily.   Psychiatric/Behavioral:  Negative for sleep disturbance, suicidal ideas and depressed mood. The patient is not nervous/anxious.        Past History:  Medical History: has a past medical history of Allergic rhinitis, Aortic insufficiency (2022), Carter esophagus (2006), Benign essential hypertension, Breast complaint (2008), Cataract, Class 1 obesity in adult, Diabetes mellitus, type 2 (2013), Diabetic peripheral neuropathy associated with type 2 diabetes mellitus, Diverticulitis (2005), Diverticulosis of large intestine without hemorrhage (2011), Edema, Fatigue, Fibromyalgia, Gastroesophageal reflux disease without esophagitis, Gastroparesis, Hammer toe, Herpes simplex, Hiatal hernia with gastroesophageal reflux, High risk medication use, Irritable bowel syndrome, Irritable bowel syndrome with constipation, Left retinal detachment (2010), Microscopic hematuria (1995), Mixed hyperlipidemia, Lizama neuroma, left (2010), Lizama's neuroma of both feet (2007), Lizama's neuroma of left foot (2008), Onychomycosis of left great toe, Plantar fasciitis, Recurrent major  depression in partial remission, Rheumatoid arthritis involving multiple joints, Tear of meniscus of left knee (2008), Urinary incontinence (2022), and Vitamin D deficiency.   Surgical History: has a past surgical history that includes Lizama's Neuroma Excision (Left, 2008); Cystoscopy; Knee arthroscopy; Tonsillectomy and adenoidectomy; Dilation and curettage of uterus; Bunionectomy (Bilateral); Colonoscopy (2011); Esophagogastroduodenoscopy (2008); Upper gastrointestinal endoscopy; Tubal ligation; Hysterectomy; Hernia repair; Esophagogastroduodenoscopy (03/2023); and Umbilical hernia repair (10/2023).   Family History: family history includes Alzheimer's disease in her mother; Heart failure in her mother; Hyperlipidemia in her mother; Hypertension in her mother.   Social History: reports that she quit smoking about 35 years ago. Her smoking use included cigarettes. She started smoking about 55 years ago. She has a 20 pack-year smoking history. She has never been exposed to tobacco smoke. She has never used smokeless tobacco. She reports that she does not currently use alcohol. She reports that she does not use drugs.      Current Outpatient Medications:     amLODIPine (NORVASC) 5 MG tablet, Take 1 tablet by mouth Daily., Disp: 90 tablet, Rfl: 3    ammonium lactate (AMLACTIN) 12 % cream, , Disp: , Rfl:     dexlansoprazole (DEXILANT) 60 MG capsule, Take 1 capsule by mouth Daily., Disp: 90 capsule, Rfl: 3    Diclofenac Sodium (VOLTAREN) 1 % gel gel, , Disp: , Rfl:     DULoxetine (CYMBALTA) 60 MG capsule, Take 1 capsule by mouth Daily., Disp: 90 capsule, Rfl: 3    Enbrel Mini 50 MG/ML solution cartridge, Inject 50 mg under the skin into the appropriate area as directed Every 7 (Seven) Days., Disp: , Rfl:     estradiol (ESTRACE) 0.1 MG/GM vaginal cream, , Disp: , Rfl:     gabapentin (NEURONTIN) 100 MG capsule, Take 2 capsules by mouth Daily., Disp: , Rfl:     hydroxychloroquine (PLAQUENIL) 200 MG tablet, Take 1 tablet  by mouth 2 (Two) Times a Day., Disp: , Rfl:     metoprolol succinate XL (TOPROL-XL) 100 MG 24 hr tablet, Take 1 tablet by mouth Daily., Disp: 90 tablet, Rfl: 3    ondansetron ODT (ZOFRAN-ODT) 4 MG disintegrating tablet, Place 1 tablet on the tongue Every 8 (Eight) Hours As Needed for Nausea or Vomiting., Disp: 20 tablet, Rfl: 0    pravastatin (PRAVACHOL) 20 MG tablet, Take 1 tablet by mouth Daily., Disp: 90 tablet, Rfl: 3    promethazine (PHENERGAN) 25 MG suppository, Insert 1 suppository into the rectum Every 6 (Six) Hours As Needed for Nausea or Vomiting., Disp: 30 suppository, Rfl: 1    tiZANidine (ZANAFLEX) 4 MG tablet, Take 1 tablet by mouth every night at bedtime., Disp: 90 tablet, Rfl: 3    traMADol (ULTRAM) 50 MG tablet, Take 1 tablet by mouth Every 6 (Six) Hours As Needed for Moderate Pain., Disp: 360 tablet, Rfl: 1    valsartan-hydrochlorothiazide (DIOVAN-HCT) 320-25 MG per tablet, Take 1 tablet by mouth Daily., Disp: 90 tablet, Rfl: 3    Allergies: Myrbetriq [mirabegron], Erythromycin, Meloxicam, Metronidazole, and Cerave [albolene]    Physical Exam  Constitutional:       General: She is not in acute distress.     Appearance: She is not toxic-appearing.   HENT:      Head: Normocephalic and atraumatic.      Right Ear: Ear canal and external ear normal.      Left Ear: Ear canal and external ear normal.      Nose: Nose normal.      Mouth/Throat:      Mouth: Mucous membranes are moist.      Pharynx: Oropharynx is clear.   Eyes:      General: No scleral icterus.     Extraocular Movements: Extraocular movements intact.      Conjunctiva/sclera: Conjunctivae normal.      Pupils: Pupils are equal, round, and reactive to light.   Neck:      Vascular: No carotid bruit.   Cardiovascular:      Rate and Rhythm: Normal rate and regular rhythm.      Pulses: Normal pulses.      Heart sounds: Normal heart sounds. No murmur heard.     No gallop.   Pulmonary:      Effort: Pulmonary effort is normal.      Breath sounds:  Normal breath sounds. No wheezing or rales.   Chest:      Chest wall: No tenderness.   Abdominal:      General: Bowel sounds are normal. There is no distension.      Palpations: Abdomen is soft. There is no mass.      Tenderness: There is no abdominal tenderness. There is no guarding or rebound.   Musculoskeletal:         General: No swelling or deformity. Normal range of motion.      Cervical back: Normal range of motion. No rigidity.      Right lower leg: No edema.      Left lower leg: No edema.   Lymphadenopathy:      Cervical: No cervical adenopathy.   Skin:     General: Skin is warm and dry.      Capillary Refill: Capillary refill takes less than 2 seconds.      Coloration: Skin is not jaundiced.      Findings: No erythema or rash.   Neurological:      General: No focal deficit present.      Mental Status: She is alert and oriented to person, place, and time.      Cranial Nerves: No cranial nerve deficit.      Motor: No weakness.      Coordination: Coordination normal.      Gait: Gait normal.   Psychiatric:         Mood and Affect: Mood normal.         Behavior: Behavior normal.         Thought Content: Thought content normal.         Judgment: Judgment normal.                   Assessment and Plan   Diagnoses and all orders for this visit:    1. Essential hypertension, benign (Primary)  Patient will continue current medications.  I offered to check labs today but her labs have been very stable for the last several years, and she is doing great clinically, so she declined and would rather wait until her next appointment in 6 months, which is satisfactory in my opinion.  2. Prediabetes  Patient has been erroneously marked as diabetic. Based on the available clinical information, she does not have diabetes and should therefore be excluded from diabetic health maintenance and quality measures for the remainder of the reporting period.    3. Mixed hyperlipidemia    4. Encounter for long-term (current) use of  high-risk medication    5. Recurrent major depression in full remission  Well-controlled with Cymbalta 60 mg daily and she will continue  6. Carter's esophagus without dysplasia    7. Gastroesophageal reflux disease without esophagitis    8. Nonrheumatic aortic valve insufficiency    9. Rheumatoid arthritis involving multiple sites with positive rheumatoid factor  -     traMADol (ULTRAM) 50 MG tablet; Take 1 tablet by mouth Every 6 (Six) Hours As Needed for Moderate Pain.  Dispense: 360 tablet; Refill: 1  Mainly managed by rheumatology, but she does use tramadol for pain control, which I have prescribed as the patient has been very responsible and reliable with this  10. Spinal stenosis of lumbar region without neurogenic claudication    11. Spondylolisthesis at L1-L2 level    12. Irritable bowel syndrome with diarrhea    Other orders  -     amLODIPine (NORVASC) 5 MG tablet; Take 1 tablet by mouth Daily.  Dispense: 90 tablet; Refill: 3  -     dexlansoprazole (DEXILANT) 60 MG capsule; Take 1 capsule by mouth Daily.  Dispense: 90 capsule; Refill: 3  -     DULoxetine (CYMBALTA) 60 MG capsule; Take 1 capsule by mouth Daily.  Dispense: 90 capsule; Refill: 3  -     metoprolol succinate XL (TOPROL-XL) 100 MG 24 hr tablet; Take 1 tablet by mouth Daily.  Dispense: 90 tablet; Refill: 3  -     valsartan-hydrochlorothiazide (DIOVAN-HCT) 320-25 MG per tablet; Take 1 tablet by mouth Daily.  Dispense: 90 tablet; Refill: 3  -     tiZANidine (ZANAFLEX) 4 MG tablet; Take 1 tablet by mouth every night at bedtime.  Dispense: 90 tablet; Refill: 3  -     pravastatin (PRAVACHOL) 20 MG tablet; Take 1 tablet by mouth Daily.  Dispense: 90 tablet; Refill: 3            Follow Up   Return in about 6 months (around 7/13/2025) for Recheck, Nurse - AWV Subsequent.  Patient was given instructions and counseling regarding her condition or for health maintenance advice. Please see specific information pulled into the AVS if appropriate.     Donny  MD Ezekiel

## 2025-01-15 ENCOUNTER — OFFICE VISIT (OUTPATIENT)
Dept: GASTROENTEROLOGY | Facility: CLINIC | Age: 80
End: 2025-01-15
Payer: MEDICARE

## 2025-01-15 VITALS
OXYGEN SATURATION: 99 % | HEIGHT: 60 IN | DIASTOLIC BLOOD PRESSURE: 72 MMHG | WEIGHT: 139 LBS | SYSTOLIC BLOOD PRESSURE: 128 MMHG | HEART RATE: 62 BPM | BODY MASS INDEX: 27.29 KG/M2 | TEMPERATURE: 98 F

## 2025-01-15 DIAGNOSIS — K31.84 GASTROPARESIS: ICD-10-CM

## 2025-01-15 DIAGNOSIS — K21.9 GASTROESOPHAGEAL REFLUX DISEASE, UNSPECIFIED WHETHER ESOPHAGITIS PRESENT: Primary | ICD-10-CM

## 2025-01-15 DIAGNOSIS — R11.15 CYCLICAL VOMITING SYNDROME: ICD-10-CM

## 2025-01-15 DIAGNOSIS — R11.2 NAUSEA AND VOMITING, UNSPECIFIED VOMITING TYPE: ICD-10-CM

## 2025-01-15 RX ORDER — METOCLOPRAMIDE 10 MG/1
TABLET ORAL
COMMUNITY
Start: 2024-08-01

## 2025-01-15 RX ORDER — ONDANSETRON 4 MG/1
4 TABLET, ORALLY DISINTEGRATING ORAL EVERY 8 HOURS PRN
Qty: 20 TABLET | Refills: 2 | Status: SHIPPED | OUTPATIENT
Start: 2025-01-15

## 2025-01-15 NOTE — PROGRESS NOTES
Follow Up      Patient Name: Mady Pichardo  : 1945   MRN: [id number]     Chief Complaint:    Chief Complaint   Patient presents with    3 month f/u    Heartburn     worse    Abdominal Pain     improved    Nausea     same    Vomiting       History of Present Illness: Mady Pichardo is a 79 y.o. female who is here today for follow up on nausea and vomiting.    Pt continues to do well with Dexilant 60mg daily for GERD sx. She continues to struggle with zofran daily and intermittent episodes of persistent emesis. She has not been able to identify any particular triggers. She is requesting a refill for zofran today.    Patient denies associated fever, chills, abdominal pain, indigestion, diarrhea, constipation, hematemesis, dysphagia, hematochezia, melena, bloating, weight loss or gain, dysuria, jaundice or bruising.    CT A/P w/wo contrast 10/2023: No acute process is seen. 1.8cm subumbilical nonspecific subcutaneous fluid collection.      EGD 3/2023 with Dr. Osborn at [facility]. Z-line irregular at 34cm. 4cm hiatal hernia. Esophageal mucosal changes consistent with short-segment Carter's esophagus at GEJ, 1cm in length. Normal esophagus otherwise. Diffuse moderate gastritis. Normal duodenum. Recommend repeat EGD in 3 years. Pathology not available at time of dictation.      CSY few years ago with significant diverticulosis. No polyps, no further CSY recommended.    Subjective      Review of Systems:   Review of Systems   Constitutional:  Negative for appetite change, chills, diaphoresis, fatigue, fever, unexpected weight gain and unexpected weight loss.   HENT:  Negative for drooling, facial swelling, mouth sores, rhinorrhea, sore throat, tinnitus, trouble swallowing and voice change.    Eyes: Negative.    Respiratory:  Negative for cough, chest tightness and shortness of breath.    Cardiovascular:  Negative for chest pain.   Gastrointestinal:  Positive for nausea, vomiting and GERD  (well controlled). Negative for abdominal pain, blood in stool, constipation, diarrhea and indigestion.   Genitourinary:  Negative for dysuria, flank pain, hematuria and pelvic pain.   Musculoskeletal:  Negative for arthralgias and myalgias.   Skin:  Negative for color change, pallor and rash.   Neurological:  Negative for dizziness, tremors, syncope, weakness and numbness.   Psychiatric/Behavioral:  Negative for hallucinations and sleep disturbance. The patient is not nervous/anxious.    All other systems reviewed and are negative.      Medications:     Current Outpatient Medications:     amLODIPine (NORVASC) 5 MG tablet, Take 1 tablet by mouth Daily., Disp: 90 tablet, Rfl: 3    ammonium lactate (AMLACTIN) 12 % cream, , Disp: , Rfl:     dexlansoprazole (DEXILANT) 60 MG capsule, Take 1 capsule by mouth Daily., Disp: 90 capsule, Rfl: 3    Diclofenac Sodium (VOLTAREN) 1 % gel gel, , Disp: , Rfl:     DULoxetine (CYMBALTA) 60 MG capsule, Take 1 capsule by mouth Daily., Disp: 90 capsule, Rfl: 3    Enbrel Mini 50 MG/ML solution cartridge, Inject 50 mg under the skin into the appropriate area as directed Every 7 (Seven) Days., Disp: , Rfl:     estradiol (ESTRACE) 0.1 MG/GM vaginal cream, , Disp: , Rfl:     gabapentin (NEURONTIN) 100 MG capsule, Take 2 capsules by mouth Daily., Disp: , Rfl:     hydroxychloroquine (PLAQUENIL) 200 MG tablet, Take 1 tablet by mouth 2 (Two) Times a Day., Disp: , Rfl:     metoclopramide (REGLAN) 10 MG tablet, , Disp: , Rfl:     metoprolol succinate XL (TOPROL-XL) 100 MG 24 hr tablet, Take 1 tablet by mouth Daily., Disp: 90 tablet, Rfl: 3    ondansetron ODT (ZOFRAN-ODT) 4 MG disintegrating tablet, Place 1 tablet on the tongue Every 8 (Eight) Hours As Needed for Nausea or Vomiting., Disp: 20 tablet, Rfl: 0    pravastatin (PRAVACHOL) 20 MG tablet, Take 1 tablet by mouth Daily., Disp: 90 tablet, Rfl: 3    promethazine (PHENERGAN) 25 MG suppository, Insert 1 suppository into the rectum Every 6 (Six)  Hours As Needed for Nausea or Vomiting., Disp: 30 suppository, Rfl: 1    tiZANidine (ZANAFLEX) 4 MG tablet, Take 1 tablet by mouth every night at bedtime., Disp: 90 tablet, Rfl: 3    traMADol (ULTRAM) 50 MG tablet, Take 1 tablet by mouth Every 6 (Six) Hours As Needed for Moderate Pain., Disp: 360 tablet, Rfl: 1    valsartan-hydrochlorothiazide (DIOVAN-HCT) 320-25 MG per tablet, Take 1 tablet by mouth Daily., Disp: 90 tablet, Rfl: 3    Allergies:   Allergies   Allergen Reactions    Meloxicam GI Bleeding     Other reaction(s): GI Bleeding    Ceramides Hives    Myrbetriq [Mirabegron] Nausea And Vomiting    Erythromycin GI Intolerance, Nausea And Vomiting and Nausea Only     Other reaction(s): GI Intolerance    Metronidazole GI Intolerance and Nausea Only     Other reaction(s): GI Intolerance    Cerave [Albolene] Rash    Erythromycin Base Nausea Only       Social History:   Social History     Socioeconomic History    Marital status:    Tobacco Use    Smoking status: Former     Current packs/day: 0.00     Average packs/day: 1 pack/day for 20.0 years (20.0 ttl pk-yrs)     Types: Cigarettes     Start date:      Quit date:      Years since quittin.0     Passive exposure: Never    Smokeless tobacco: Never   Vaping Use    Vaping status: Never Used   Substance and Sexual Activity    Alcohol use: Not Currently    Drug use: Never    Sexual activity: Defer        Surgical History:   Past Surgical History:   Procedure Laterality Date    BUNIONECTOMY Bilateral     COLONOSCOPY      CYSTOSCOPY      DILATATION AND CURETTAGE      ENDOSCOPY      WITH BIOPSY ( ,, -, 2011)    ENDOSCOPY  2023    Carter's, repeat due in 3yrs per GI    HERNIA REPAIR      HYSTERECTOMY      KNEE ARTHROSCOPY      CAZARES'S NEUROMA EXCISION Left     ALSO 2010    TONSILLECTOMY AND ADENOIDECTOMY      TUBAL ABDOMINAL LIGATION      UMBILICAL HERNIA REPAIR  10/2023    [facility] [place] [place], successful    UPPER  "GASTROINTESTINAL ENDOSCOPY          Medical History:   Past Medical History:   Diagnosis Date    Allergic rhinitis     Aortic insufficiency 2022    on ECHO    Carter esophagus 2006    EGD WITH BIOPSY      2008,2006, 4-2009, 11/2011, 2023-repeat due in 2026 per GI    Benign essential hypertension     Breast complaint 2008    \"DIMPLE\" DX MMG AND ULTRASOUND, ALL NORMAL    Cataract     NOT IN NEED OF SURGERY YET    Class 1 obesity in adult     Diabetes mellitus, type 2 2013    This was actually steroid-induced, and resolved when she went off of steroids for her rheumatoid arthritis    Diabetic peripheral neuropathy associated with type 2 diabetes mellitus     Diverticulitis 2005    IV ATBX - IMPROVED    Diverticulosis of large intestine without hemorrhage 2011    Edema     WITH VENOUS INSUFFICIENCY    Fatigue     Fibromyalgia     Gastroesophageal reflux disease without esophagitis     Gastroparesis     Seen on gastric emptying study, symptoms have waxed and waned over the years    Hammer toe     Herpes simplex     GENITAL  - CONTROLLED W/ CHRONIC FAMVIR    Hiatal hernia with gastroesophageal reflux     High risk medication use     Irritable bowel syndrome     Irritable bowel syndrome with constipation     Left retinal detachment 2010    LASER TREATMENT, SUCCESSFUL    Microscopic hematuria 1995    IVP/CYSTOSCOPY    Mixed hyperlipidemia     Lizama neuroma, left 2010    2ND    Lizama's neuroma of both feet 2007    INJECTION, FAILED, SURGERY PLANNED    Lizama's neuroma of left foot 2008    NEUROMA SURGICALLY REMOVED, LEFT FOOT    Onychomycosis of left great toe     Plantar fasciitis     Recurrent major depression in partial remission     Rheumatoid arthritis involving multiple joints     Tear of meniscus of left knee 2008    Urinary incontinence 2022    Bladder stimulator implanted and doing well    Vitamin D deficiency         Objective     Physical Exam:  Vital Signs:   Vitals:    01/15/25 1151   BP: 128/72   BP " "Location: Right arm   Patient Position: Sitting   Cuff Size: Adult   Pulse: 62   Temp: 98 °F (36.7 °C)   TempSrc: Temporal   SpO2: 99%   Weight: 63 kg (139 lb)   Height: 152.4 cm (60\")   PainSc: 0-No pain   PainLoc: Abdomen     Body mass index is 27.15 kg/m².     Physical Exam  Vitals and nursing note reviewed.   Constitutional:       Appearance: Normal appearance. She is normal weight. She is not ill-appearing or diaphoretic.      Comments: BMI 27.15. Pleasantly conversant.    HENT:      Head: Normocephalic and atraumatic.      Right Ear: External ear normal.      Left Ear: External ear normal.      Nose: Nose normal.      Mouth/Throat:      Mouth: Mucous membranes are moist.      Pharynx: Oropharynx is clear.   Eyes:      Conjunctiva/sclera: Conjunctivae normal.      Pupils: Pupils are equal, round, and reactive to light.   Neck:      Thyroid: No thyromegaly.   Cardiovascular:      Rate and Rhythm: Normal rate and regular rhythm.      Pulses: Normal pulses.      Heart sounds: Normal heart sounds.   Pulmonary:      Effort: Pulmonary effort is normal.      Breath sounds: Normal breath sounds.   Abdominal:      General: Abdomen is flat. Bowel sounds are normal. There is no distension.      Tenderness: There is no abdominal tenderness.   Musculoskeletal:         General: Normal range of motion.      Cervical back: Normal range of motion and neck supple.   Skin:     General: Skin is warm and dry.   Neurological:      General: No focal deficit present.      Mental Status: She is oriented to person, place, and time.   Psychiatric:         Mood and Affect: Mood normal.         Assessment / Plan      Assessment/Plan:   There are no diagnoses linked to this encounter.     GERD  Carter's esophagus, dysplasia unspecified  Cyclical vomiting / gastroparesis  H/o delayed gastric emptying via GES in past   - continue all medications as prescribed, continue hema root 500mg BID   - refills for zofran sent to pharmacy    - pt " advised to start CoQ10, l-carnitine, riboflavin daily    - recommend continued adequate oral hydration, start soluble fiber supplement daily   - pt given GERD diet instructions, advised to avoid GI irritants such as caffeine, carbonation, EtOH, tobacco, chocolate, peppermint, acid-based foods   - previous imaging, endoscopy reports reviewed, will request pathology from most recent EGD   - follow up in clinic in 3mo, or after completion of above studies   - call clinic at any time for questions or new / worsened sx    Follow Up:   Return in about 3 months (around 4/15/2025).    Plan of care reviewed with the patient at the conclusion of today's visit.  Education was provided regarding diagnosis, management, and any prescribed or recommended OTC medications.  Patient verbalized understanding of and agreement with management plan.     NOTE TO PATIENT: The 21st Century Cures Act makes medical notes like these available to patients in the interest of transparency. However, be advised this is a medical document. It is intended as peer to peer communication. It is written in medical language and may contain abbreviations or verbiage that are unfamiliar. It may appear blunt or direct. Medical documents are intended to carry relevant information, facts as evident, and the clinical opinion of the practitioner.     Time Statement:   Discussed plan of care in detail with patient today. Patient verbally understands and agrees. I have spent 30 minutes reviewing available diagnostics, obtaining history, examining the patient, developing a treatment plan, and educating the patient on disease process and plan of care.     Nita Riley PA-C   [facility]

## 2025-01-22 ENCOUNTER — OFFICE VISIT (OUTPATIENT)
Dept: UROLOGY | Facility: CLINIC | Age: 80
End: 2025-01-22
Payer: MEDICARE

## 2025-01-22 VITALS — WEIGHT: 138 LBS | BODY MASS INDEX: 27.09 KG/M2 | HEIGHT: 60 IN

## 2025-01-22 DIAGNOSIS — N32.81 OAB (OVERACTIVE BLADDER): Primary | ICD-10-CM

## 2025-01-22 DIAGNOSIS — N39.41 URGE INCONTINENCE: ICD-10-CM

## 2025-01-22 DIAGNOSIS — R39.9 LOWER URINARY TRACT SYMPTOMS (LUTS): ICD-10-CM

## 2025-01-22 PROCEDURE — 99213 OFFICE O/P EST LOW 20 MIN: CPT | Performed by: UROLOGY

## 2025-01-22 PROCEDURE — 1159F MED LIST DOCD IN RCRD: CPT | Performed by: UROLOGY

## 2025-01-22 PROCEDURE — 1160F RVW MEDS BY RX/DR IN RCRD: CPT | Performed by: UROLOGY

## 2025-01-22 NOTE — PROGRESS NOTES
"     LUTS Female Office Visit      Patient Name: Mady Pichardo  : 1945   MRN: [id number]     Chief Complaint:  Lower Urinary Tract Symptoms.       History of Present Illness: Mrs. Pichardo is a 79 y.o. female with history of lower urinary tract symptoms.  [facility] today for 3-month follow-up secondary to OAB related symptoms.  She has undergone successful placement of SNS device and is doing well at this time      Subjective      Review of System: Review of Systems   Genitourinary:  Negative for decreased urine volume, difficulty urinating, dysuria, enuresis, flank pain, frequency, hematuria and urgency.      I have reviewed the ROS documented by my clinical staff, updated appropriate and I agree. Jacob Sterling MD    Past Medical History:  Past Medical History:   Diagnosis Date    Allergic rhinitis     Aortic insufficiency     on ECHO    Carter esophagus     EGD WITH BIOPSY      ,, -, 2011, -repeat due in  per GI    Benign essential hypertension     Breast complaint     \"DIMPLE\" DX MMG AND ULTRASOUND, ALL NORMAL    Cataract     NOT IN NEED OF SURGERY YET    Class 1 obesity in adult     Diabetes mellitus, type 2     This was actually steroid-induced, and resolved when she went off of steroids for her rheumatoid arthritis    Diabetic peripheral neuropathy associated with type 2 diabetes mellitus     Diverticulitis     IV ATBX - IMPROVED    Diverticulosis of large intestine without hemorrhage     Edema     WITH VENOUS INSUFFICIENCY    Fatigue     Fibromyalgia     Gastroesophageal reflux disease without esophagitis     Gastroparesis     Seen on gastric emptying study, symptoms have waxed and waned over the years    Hammer toe     Herpes simplex     GENITAL  - CONTROLLED W/ CHRONIC FAMVIR    Hiatal hernia with gastroesophageal reflux     High risk medication use     Irritable bowel syndrome     Irritable bowel syndrome with constipation     Left retinal detachment  "    LASER TREATMENT, SUCCESSFUL    Microscopic hematuria 1995    IVP/CYSTOSCOPY    Mixed hyperlipidemia     Lizama neuroma, left 2010    2ND    Lizama's neuroma of both feet 2007    INJECTION, FAILED, SURGERY PLANNED    Lizama's neuroma of left foot 2008    NEUROMA SURGICALLY REMOVED, LEFT FOOT    Onychomycosis of left great toe     Plantar fasciitis     Recurrent major depression in partial remission     Rheumatoid arthritis involving multiple joints     Tear of meniscus of left knee 2008    Urinary incontinence 2022    Bladder stimulator implanted and doing well    Vitamin D deficiency        Past Surgical History:  Past Surgical History:   Procedure Laterality Date    BUNIONECTOMY Bilateral     COLONOSCOPY  2011    CYSTOSCOPY      DILATATION AND CURETTAGE      ENDOSCOPY  2008    WITH BIOPSY ( 2008,2006, 4-2009, 11/2011)    ENDOSCOPY  03/2023    Carter's, repeat due in 3yrs per GI    HERNIA REPAIR      HYSTERECTOMY      KNEE ARTHROSCOPY      LIZAMA'S NEUROMA EXCISION Left 2008    ALSO 2010    TONSILLECTOMY AND ADENOIDECTOMY      TUBAL ABDOMINAL LIGATION      UMBILICAL HERNIA REPAIR  10/2023    [facility] [place], Dr Francis, successful    UPPER GASTROINTESTINAL ENDOSCOPY         Medications:    Current Outpatient Medications:     amLODIPine (NORVASC) 5 MG tablet, Take 1 tablet by mouth Daily., Disp: 90 tablet, Rfl: 3    ammonium lactate (AMLACTIN) 12 % cream, , Disp: , Rfl:     dexlansoprazole (DEXILANT) 60 MG capsule, Take 1 capsule by mouth Daily., Disp: 90 capsule, Rfl: 3    Diclofenac Sodium (VOLTAREN) 1 % gel gel, , Disp: , Rfl:     DULoxetine (CYMBALTA) 60 MG capsule, Take 1 capsule by mouth Daily., Disp: 90 capsule, Rfl: 3    Enbrel Mini 50 MG/ML solution cartridge, Inject 50 mg under the skin into the appropriate area as directed Every 7 (Seven) Days., Disp: , Rfl:     estradiol (ESTRACE) 0.1 MG/GM vaginal cream, , Disp: , Rfl:     gabapentin (NEURONTIN) 100 MG capsule, Take 2 capsules by mouth Daily., Disp: , Rfl:      hydroxychloroquine (PLAQUENIL) 200 MG tablet, Take 1 tablet by mouth 2 (Two) Times a Day., Disp: , Rfl:     metoclopramide (REGLAN) 10 MG tablet, , Disp: , Rfl:     metoprolol succinate XL (TOPROL-XL) 100 MG 24 hr tablet, Take 1 tablet by mouth Daily., Disp: 90 tablet, Rfl: 3    ondansetron ODT (ZOFRAN-ODT) 4 MG disintegrating tablet, Place 1 tablet on the tongue Every 8 (Eight) Hours As Needed for Nausea or Vomiting., Disp: 20 tablet, Rfl: 2    pravastatin (PRAVACHOL) 20 MG tablet, Take 1 tablet by mouth Daily., Disp: 90 tablet, Rfl: 3    promethazine (PHENERGAN) 25 MG suppository, Insert 1 suppository into the rectum Every 6 (Six) Hours As Needed for Nausea or Vomiting., Disp: 30 suppository, Rfl: 1    tiZANidine (ZANAFLEX) 4 MG tablet, Take 1 tablet by mouth every night at bedtime., Disp: 90 tablet, Rfl: 3    traMADol (ULTRAM) 50 MG tablet, Take 1 tablet by mouth Every 6 (Six) Hours As Needed for Moderate Pain., Disp: 360 tablet, Rfl: 1    valsartan-hydrochlorothiazide (DIOVAN-HCT) 320-25 MG per tablet, Take 1 tablet by mouth Daily., Disp: 90 tablet, Rfl: 3    Allergies:  Allergies   Allergen Reactions    Meloxicam GI Bleeding     Other reaction(s): GI Bleeding    Ceramides Hives    Myrbetriq [Mirabegron] Nausea And Vomiting    Erythromycin GI Intolerance, Nausea And Vomiting and Nausea Only     Other reaction(s): GI Intolerance    Metronidazole GI Intolerance and Nausea Only     Other reaction(s): GI Intolerance    Cerave [Albolene] Rash    Erythromycin Base Nausea Only       Social History:  Social History     Socioeconomic History    Marital status:    Tobacco Use    Smoking status: Former     Current packs/day: 0.00     Average packs/day: 1 pack/day for 20.0 years (20.0 ttl pk-yrs)     Types: Cigarettes     Start date:      Quit date:      Years since quittin.0     Passive exposure: Never    Smokeless tobacco: Never   Vaping Use    Vaping status: Never Used   Substance and Sexual Activity  "   Alcohol use: Not Currently    Drug use: Never    Sexual activity: Defer       Family History:  Family History   Problem Relation Age of Onset    Hyperlipidemia Mother     Hypertension Mother     Heart failure Mother     Alzheimer's disease Mother     Colon cancer Neg Hx     Colon polyps Neg Hx     Esophageal cancer Neg Hx      Bladder & Bowel Symptom Questionnaire    How often do you usually urinate during the day ?   2 - About every 2-3 hours    2.   How many timed do you urinate at night?   1 - 2 times at night   3.   What is the reason that you usually urinate?   2 - Moderate urge (can delay 10-60 min)   4.   Once you get the urge to go, how long can you     comfortably delay?   2 - 10-30 min   5.   How often do you get a sudden urge that makes you rush to the bathroom?   3 - A few times a week   6.   How often does a sudden urge to urinate result in you leaking urine or wetting pads?   3 - A few times a week   7.  In your opinion, how good is your bladder control?   2 - Good   8.  Do you have accidental bowel leakage?   yes   9.  Do you have difficulty fully emptying your bladder?   no   10.  Do you experience accidental leakage when performing some physical activity such as coughing, sneezing, laughing or exercise?   yes   11. Have you tried medications to help improve your symptoms?   yes   12. Would you be interested in learning about a long-lasting option that may help you with your symptoms?   yes                                                                             Total Score   15     0-7 (Mild) 8-16 (Moderate) 17-28 (Severe)       Objective     Physical Exam:   Vital Signs:   Vitals:    01/22/25 0944   Weight: 62.6 kg (138 lb)   Height: 152.4 cm (60\")     Body mass index is 26.95 kg/m².     Physical Exam  Vitals and nursing note reviewed.   Constitutional:       Appearance: Normal appearance.   HENT:      Head: Normocephalic and atraumatic.   Cardiovascular:      Comments: Well " perfused  Pulmonary:      Effort: Pulmonary effort is normal.   Abdominal:      General: Abdomen is flat.      Palpations: Abdomen is soft.   Musculoskeletal:         General: Normal range of motion.   Skin:     General: Skin is warm and dry.   Neurological:      General: No focal deficit present.      Mental Status: She is alert and oriented to person, place, and time. Mental status is at baseline.   Psychiatric:         Mood and Affect: Mood normal.         Behavior: Behavior normal.         Thought Content: Thought content normal.         Judgment: Judgment normal.         Labs:   Brief Urine Lab Results       None                 Lab Results   Component Value Date    GLUCOSE 89 05/13/2024    CALCIUM 9.6 05/13/2024     05/13/2024    K 4.0 05/13/2024    CO2 23 05/13/2024     05/13/2024    BUN 20 05/13/2024    CREATININE 0.56 (L) 05/13/2024    BCR 36 (H) 05/13/2024    ANIONGAP 9.5 03/22/2024       Lab Results   Component Value Date    WBC 7.2 05/13/2024    HGB 13.1 05/13/2024    HCT 38.9 05/13/2024    MCV 88 05/13/2024     05/13/2024       Images:   No Images in the past 120 days found..    Measures:   Tobacco:   Mady Pichardo  reports that she quit smoking about 35 years ago. Her smoking use included cigarettes. She started smoking about 55 years ago. She has a 20 pack-year smoking history. She has never been exposed to tobacco smoke. She has never used smokeless tobacco. I have educated her on the risk of diseases from using tobacco product    Assessment / Plan      Assessment:  Mrs. Pichardo is a 79 y.o. female who presents today with lower urinary tract symptoms including frequency, urgency and her desisting continence.  She has undergone successful placement of SNS device with improvement in symptoms.  Currently stable.  No programming related questions today.  She has follow-up with urogyn at  and is undergoing possible operative intervention.  We will have her follow-up in 6 months for  evaluation, symptom check following procedure for OAB related urinary symptoms..    Diagnoses and all orders for this visit:    1. OAB (overactive bladder) (Primary)    2. Lower urinary tract symptoms (LUTS)    3. Urge incontinence        Patient Education:         Today we discussed the etiology and management of LUTS/OAB. We discussed work-up including history and physical exam as well as urinalysis.  We discussed PVR.  We discussed evaluation and management of urinary urgency and overactivity of the bladder.  We discussed conservative management and behavioral strategies including decreasing irritative p.o. fluid intake, timed voiding, double voiding.  We discussed the role that constipation can play in lower urinary tract symptoms and improving bowel hygiene.  We discussed management of overactive bladder including conservative p.o. medication options.  We discussed anticholinergic medication class and the risks and benefits of this medication including side effects including dry mouth, dry, constipation and others.  We discussed that there are additional medications including beta-3 agonist, and associated risks and benefits including hypertension and requirement to monitor blood pressure after starting.  We also discussed  third line potential procedural interventions including intradetrusor botox injections and sacral nerve neuromodulation. We discussed risks and benefits of these procedures.  I discussed the role of a bladder diary and how this will help identify her most concerning urinary symptoms.           Follow Up:   Return in about 6 months (around 7/22/2025) for Recheck.    I spent approximately 20 minutes providing clinical care for this patient; including review of patient's chart and provider documentation, face to face time spent with patient in examination room (obtaining history, performing physical exam, discussing diagnosis and management options), placing orders, and completing patient  documentation.     Jacob Sterling MD  [facility] [place] [facility] [place]

## 2025-04-23 ENCOUNTER — OFFICE VISIT (OUTPATIENT)
Dept: GASTROENTEROLOGY | Facility: CLINIC | Age: 80
End: 2025-04-23
Payer: MEDICARE

## 2025-04-23 VITALS
HEART RATE: 52 BPM | OXYGEN SATURATION: 98 % | SYSTOLIC BLOOD PRESSURE: 128 MMHG | TEMPERATURE: 97.7 F | WEIGHT: 144 LBS | BODY MASS INDEX: 28.27 KG/M2 | HEIGHT: 60 IN | DIASTOLIC BLOOD PRESSURE: 62 MMHG

## 2025-04-23 DIAGNOSIS — K22.70 BARRETT'S ESOPHAGUS WITHOUT DYSPLASIA: ICD-10-CM

## 2025-04-23 DIAGNOSIS — R11.15 CYCLICAL VOMITING SYNDROME: Primary | ICD-10-CM

## 2025-04-23 DIAGNOSIS — K21.9 GASTROESOPHAGEAL REFLUX DISEASE, UNSPECIFIED WHETHER ESOPHAGITIS PRESENT: ICD-10-CM

## 2025-04-23 NOTE — PROGRESS NOTES
Follow Up      Patient Name: Mady Pichardo  : 1945   MRN: [id number]     Chief Complaint:    Chief Complaint   Patient presents with    Follow-up     Health Maintenance Follow up. Pt is not experiencing any GI Symptoms currently.       History of Present Illness: Mady Pichardo is a 79 y.o. female who is here today for follow up on [facility].    Pt is doing very well with [facility] ppx of CoQ10, riboflavin and l-carnitine. She reports very rare episodes of nausea and no vomiting since last visit. She is using hema root sporadically when she remembers to take her. GERD continues to be well controlled with Dexilant 60mg daily.     Patient denies associated fever, chills, abdominal pain, diarrhea, constipation, hematemesis, dysphagia, hematochezia, melena, bloating, weight loss or gain, dysuria, jaundice or bruising.    CT A/P w/wo contrast 10/2023: No acute process is seen. 1.8cm subumbilical nonspecific subcutaneous fluid collection.      EGD 3/2023 with Dr. Osborn at [facility]. Z-line irregular at 34cm. 4cm hiatal hernia. Esophageal mucosal changes consistent with short-segment Carter's esophagus at GEJ, 1cm in length. Normal esophagus otherwise. Diffuse moderate gastritis. Normal duodenum. Recommend repeat EGD in 3 years. Pathology not available at time of dictation.      CSY few years ago with significant diverticulosis. No polyps, no further CSY recommended.     Subjective      Review of Systems:   Review of Systems   Constitutional:  Negative for appetite change, chills, diaphoresis, fatigue, fever, unexpected weight gain and unexpected weight loss.   HENT:  Negative for drooling, facial swelling, mouth sores, rhinorrhea, sore throat, tinnitus, trouble swallowing and voice change.    Eyes: Negative.    Respiratory:  Negative for cough, chest tightness and shortness of breath.    Cardiovascular:  Negative for chest pain.   Gastrointestinal:  Positive for nausea (rare). Negative for  abdominal pain, blood in stool, constipation, diarrhea, vomiting, GERD and indigestion.   Genitourinary:  Negative for dysuria, flank pain, hematuria and pelvic pain.   Musculoskeletal:  Negative for arthralgias and myalgias.   Skin:  Negative for color change, pallor and rash.   Neurological:  Negative for dizziness, tremors, syncope, weakness and numbness.   Psychiatric/Behavioral:  Negative for hallucinations and sleep disturbance. The patient is not nervous/anxious.    All other systems reviewed and are negative.      Medications:     Current Outpatient Medications:     amLODIPine (NORVASC) 5 MG tablet, Take 1 tablet by mouth Daily., Disp: 90 tablet, Rfl: 3    ammonium lactate (AMLACTIN) 12 % cream, , Disp: , Rfl:     dexlansoprazole (DEXILANT) 60 MG capsule, Take 1 capsule by mouth Daily., Disp: 90 capsule, Rfl: 3    Diclofenac Sodium (VOLTAREN) 1 % gel gel, , Disp: , Rfl:     DULoxetine (CYMBALTA) 60 MG capsule, Take 1 capsule by mouth Daily., Disp: 90 capsule, Rfl: 3    Enbrel Mini 50 MG/ML solution cartridge, Inject 50 mg under the skin into the appropriate area as directed Every 7 (Seven) Days., Disp: , Rfl:     estradiol (ESTRACE) 0.1 MG/GM vaginal cream, , Disp: , Rfl:     gabapentin (NEURONTIN) 100 MG capsule, Take 2 capsules by mouth Daily., Disp: , Rfl:     hydroxychloroquine (PLAQUENIL) 200 MG tablet, Take 1 tablet by mouth 2 (Two) Times a Day., Disp: , Rfl:     metoclopramide (REGLAN) 10 MG tablet, , Disp: , Rfl:     metoprolol succinate XL (TOPROL-XL) 100 MG 24 hr tablet, Take 1 tablet by mouth Daily., Disp: 90 tablet, Rfl: 3    ondansetron ODT (ZOFRAN-ODT) 4 MG disintegrating tablet, Place 1 tablet on the tongue Every 8 (Eight) Hours As Needed for Nausea or Vomiting., Disp: 20 tablet, Rfl: 2    pravastatin (PRAVACHOL) 20 MG tablet, Take 1 tablet by mouth Daily., Disp: 90 tablet, Rfl: 3    promethazine (PHENERGAN) 25 MG suppository, Insert 1 suppository into the rectum Every 6 (Six) Hours As Needed  for Nausea or Vomiting., Disp: 30 suppository, Rfl: 1    tiZANidine (ZANAFLEX) 4 MG tablet, Take 1 tablet by mouth every night at bedtime., Disp: 90 tablet, Rfl: 3    traMADol (ULTRAM) 50 MG tablet, Take 1 tablet by mouth Every 6 (Six) Hours As Needed for Moderate Pain., Disp: 360 tablet, Rfl: 1    valsartan-hydrochlorothiazide (DIOVAN-HCT) 320-25 MG per tablet, Take 1 tablet by mouth Daily., Disp: 90 tablet, Rfl: 3    Allergies:   Allergies   Allergen Reactions    Meloxicam GI Bleeding     Other reaction(s): GI Bleeding    Ceramides Hives    Myrbetriq [Mirabegron] Nausea And Vomiting    Erythromycin GI Intolerance, Nausea And Vomiting and Nausea Only     Other reaction(s): GI Intolerance    Metronidazole GI Intolerance and Nausea Only     Other reaction(s): GI Intolerance    Cerave [Albolene] Rash    Erythromycin Base Nausea Only       Social History:   Social History     Socioeconomic History    Marital status:    Tobacco Use    Smoking status: Former     Current packs/day: 0.00     Average packs/day: 1 pack/day for 20.0 years (20.0 ttl pk-yrs)     Types: Cigarettes     Start date:      Quit date:      Years since quittin.3     Passive exposure: Never    Smokeless tobacco: Never   Vaping Use    Vaping status: Never Used   Substance and Sexual Activity    Alcohol use: Not Currently    Drug use: Never    Sexual activity: Defer        Surgical History:   Past Surgical History:   Procedure Laterality Date    BUNIONECTOMY Bilateral     COLONOSCOPY      CYSTOSCOPY      DILATATION AND CURETTAGE      ENDOSCOPY      WITH BIOPSY ( ,, -, 2011)    ENDOSCOPY  2023    Carter's, repeat due in 3yrs per GI    HERNIA REPAIR      HYSTERECTOMY      KNEE ARTHROSCOPY      CAZARES'S NEUROMA EXCISION Left     ALSO 2010    TONSILLECTOMY AND ADENOIDECTOMY      TUBAL ABDOMINAL LIGATION      UMBILICAL HERNIA REPAIR  10/2023    [facility], successful    UPPER GASTROINTESTINAL ENDOSCOPY    "       Medical History:   Past Medical History:   Diagnosis Date    Allergic rhinitis     Aortic insufficiency 2022    on ECHO    Carter esophagus 2006    EGD WITH BIOPSY      2008,2006, 4-2009, 11/2011, 2023-repeat due in 2026 per GI    Benign essential hypertension     Breast complaint 2008    \"DIMPLE\" DX MMG AND ULTRASOUND, ALL NORMAL    Cataract     NOT IN NEED OF SURGERY YET    Class 1 obesity in adult     Diabetes mellitus, type 2 2013    This was actually steroid-induced, and resolved when she went off of steroids for her rheumatoid arthritis    Diabetic peripheral neuropathy associated with type 2 diabetes mellitus     Diverticulitis 2005    IV ATBX - IMPROVED    Diverticulosis of large intestine without hemorrhage 2011    Edema     WITH VENOUS INSUFFICIENCY    Fatigue     Fibromyalgia     Gastroesophageal reflux disease without esophagitis     Gastroparesis     Seen on gastric emptying study, symptoms have waxed and waned over the years    Hammer toe     Herpes simplex     GENITAL  - CONTROLLED W/ CHRONIC FAMVIR    Hiatal hernia with gastroesophageal reflux     High risk medication use     Irritable bowel syndrome     Irritable bowel syndrome with constipation     Left retinal detachment 2010    LASER TREATMENT, SUCCESSFUL    Microscopic hematuria 1995    IVP/CYSTOSCOPY    Mixed hyperlipidemia     Lizama neuroma, left 2010    2ND    Lizama's neuroma of both feet 2007    INJECTION, FAILED, SURGERY PLANNED    Lizama's neuroma of left foot 2008    NEUROMA SURGICALLY REMOVED, LEFT FOOT    Onychomycosis of left great toe     Plantar fasciitis     Recurrent major depression in partial remission     Rheumatoid arthritis involving multiple joints     Tear of meniscus of left knee 2008    Urinary incontinence 2022    Bladder stimulator implanted and doing well    Vitamin D deficiency         Objective     Physical Exam:  Vital Signs:   Vitals:    04/23/25 1240   BP: 128/62   Pulse: 52   Temp: 97.7 °F (36.5 °C) " "  TempSrc: Temporal   SpO2: 98%   Weight: 65.3 kg (144 lb)   Height: 152.4 cm (60\")   PainSc: 0-No pain     Body mass index is 28.12 kg/m².     Physical Exam  Vitals and nursing note reviewed.   Constitutional:       Appearance: Normal appearance. She is normal weight. She is not ill-appearing or diaphoretic.      Comments: BMI 28.12. Pleasantly conversant.    HENT:      Head: Normocephalic and atraumatic.      Right Ear: External ear normal.      Left Ear: External ear normal.      Nose: Nose normal.      Mouth/Throat:      Mouth: Mucous membranes are moist.      Pharynx: Oropharynx is clear.   Eyes:      Conjunctiva/sclera: Conjunctivae normal.      Pupils: Pupils are equal, round, and reactive to light.   Neck:      Thyroid: No thyromegaly.   Cardiovascular:      Rate and Rhythm: Normal rate and regular rhythm.      Pulses: Normal pulses.      Heart sounds: Normal heart sounds.   Pulmonary:      Effort: Pulmonary effort is normal.      Breath sounds: Normal breath sounds.   Abdominal:      General: Abdomen is flat. Bowel sounds are normal. There is no distension.      Tenderness: There is no abdominal tenderness.   Musculoskeletal:         General: Normal range of motion.      Cervical back: Normal range of motion and neck supple.   Skin:     General: Skin is warm and dry.   Neurological:      General: No focal deficit present.      Mental Status: She is oriented to person, place, and time.   Psychiatric:         Mood and Affect: Mood normal.         Assessment / Plan      Assessment/Plan:   There are no diagnoses linked to this encounter.     GERD  Carter's esophagus, dysplasia unspecified  Cyclical vomiting / gastroparesis   - continue all medications as prescribed   - continue [facility]S ppx as above   - recommend continued adequate oral hydration, start soluble fiber supplement daily   - pt given GERD diet instructions, advised to avoid GI irritants such as caffeine, carbonation, EtOH, tobacco, chocolate, " peppermint, acid-based foods   - previous office notes, hospital records, labs, imaging, endoscopy and pathology reports reviewed with patient    - due for EGD 3/2026   - follow up in clinic in 9 mo, or after completion of above studies   - call clinic at any time for questions or new / worsened sx    Follow Up:   Return in about 9 months (around 1/23/2026).    Plan of care reviewed with the patient at the conclusion of today's visit.  Education was provided regarding diagnosis, management, and any prescribed or recommended OTC medications.  Patient verbalized understanding of and agreement with management plan.     NOTE TO PATIENT: The 21st Century Cures Act makes medical notes like these available to patients in the interest of transparency. However, be advised this is a medical document. It is intended as peer to peer communication. It is written in medical language and may contain abbreviations or verbiage that are unfamiliar. It may appear blunt or direct. Medical documents are intended to carry relevant information, facts as evident, and the clinical opinion of the practitioner.     Time Statement:   Discussed plan of care in detail with patient today. Patient verbally understands and agrees. I have spent 30 minutes reviewing available diagnostics, obtaining history, examining the patient, developing a treatment plan, and educating the patient on disease process and plan of care.     Nita Riley PA-C   [facility]

## 2025-07-23 ENCOUNTER — OFFICE VISIT (OUTPATIENT)
Dept: UROLOGY | Facility: CLINIC | Age: 80
End: 2025-07-23
Payer: MEDICARE

## 2025-07-23 VITALS — WEIGHT: 142 LBS | BODY MASS INDEX: 27.88 KG/M2 | HEIGHT: 60 IN

## 2025-07-23 DIAGNOSIS — R39.15 URGENCY OF URINATION: ICD-10-CM

## 2025-07-23 DIAGNOSIS — N32.81 OAB (OVERACTIVE BLADDER): Primary | ICD-10-CM

## 2025-07-23 PROCEDURE — 1160F RVW MEDS BY RX/DR IN RCRD: CPT | Performed by: UROLOGY

## 2025-07-23 PROCEDURE — 1159F MED LIST DOCD IN RCRD: CPT | Performed by: UROLOGY

## 2025-07-23 PROCEDURE — 99214 OFFICE O/P EST MOD 30 MIN: CPT | Performed by: UROLOGY

## 2025-07-23 RX ORDER — ADALIMUMAB 40MG/0.4ML
KIT SUBCUTANEOUS
COMMUNITY
End: 2025-07-24

## 2025-07-23 RX ORDER — UBIDECARENONE 200 MG
200 TABLET ORAL
COMMUNITY

## 2025-07-24 ENCOUNTER — OFFICE VISIT (OUTPATIENT)
Dept: FAMILY MEDICINE CLINIC | Facility: CLINIC | Age: 80
End: 2025-07-24
Payer: MEDICARE

## 2025-07-24 ENCOUNTER — TELEPHONE (OUTPATIENT)
Dept: FAMILY MEDICINE CLINIC | Facility: CLINIC | Age: 80
End: 2025-07-24

## 2025-07-24 VITALS
HEART RATE: 54 BPM | SYSTOLIC BLOOD PRESSURE: 128 MMHG | WEIGHT: 143 LBS | DIASTOLIC BLOOD PRESSURE: 76 MMHG | OXYGEN SATURATION: 98 % | BODY MASS INDEX: 28.07 KG/M2 | HEIGHT: 60 IN

## 2025-07-24 DIAGNOSIS — R73.03 PREDIABETES: ICD-10-CM

## 2025-07-24 DIAGNOSIS — R10.30 LOWER ABDOMINAL PAIN: ICD-10-CM

## 2025-07-24 DIAGNOSIS — I35.1 NONRHEUMATIC AORTIC VALVE INSUFFICIENCY: ICD-10-CM

## 2025-07-24 DIAGNOSIS — Z79.899 ENCOUNTER FOR LONG-TERM (CURRENT) USE OF HIGH-RISK MEDICATION: ICD-10-CM

## 2025-07-24 DIAGNOSIS — K22.70 BARRETT'S ESOPHAGUS WITHOUT DYSPLASIA: ICD-10-CM

## 2025-07-24 DIAGNOSIS — E78.2 MIXED HYPERLIPIDEMIA: ICD-10-CM

## 2025-07-24 DIAGNOSIS — M48.061 SPINAL STENOSIS OF LUMBAR REGION WITHOUT NEUROGENIC CLAUDICATION: ICD-10-CM

## 2025-07-24 DIAGNOSIS — M05.79 RHEUMATOID ARTHRITIS INVOLVING MULTIPLE SITES WITH POSITIVE RHEUMATOID FACTOR: ICD-10-CM

## 2025-07-24 DIAGNOSIS — K58.0 IRRITABLE BOWEL SYNDROME WITH DIARRHEA: ICD-10-CM

## 2025-07-24 DIAGNOSIS — K21.9 GASTROESOPHAGEAL REFLUX DISEASE WITHOUT ESOPHAGITIS: ICD-10-CM

## 2025-07-24 DIAGNOSIS — G60.9 IDIOPATHIC PERIPHERAL NEUROPATHY: ICD-10-CM

## 2025-07-24 DIAGNOSIS — R53.83 OTHER FATIGUE: ICD-10-CM

## 2025-07-24 DIAGNOSIS — N32.81 OAB (OVERACTIVE BLADDER): ICD-10-CM

## 2025-07-24 DIAGNOSIS — K59.00 CONSTIPATION, UNSPECIFIED CONSTIPATION TYPE: ICD-10-CM

## 2025-07-24 DIAGNOSIS — I10 ESSENTIAL HYPERTENSION, BENIGN: ICD-10-CM

## 2025-07-24 DIAGNOSIS — Z00.00 MEDICARE ANNUAL WELLNESS VISIT, SUBSEQUENT: Primary | ICD-10-CM

## 2025-07-24 DIAGNOSIS — F33.42 RECURRENT MAJOR DEPRESSION IN FULL REMISSION: ICD-10-CM

## 2025-07-24 PROCEDURE — 99214 OFFICE O/P EST MOD 30 MIN: CPT | Performed by: FAMILY MEDICINE

## 2025-07-24 PROCEDURE — 1126F AMNT PAIN NOTED NONE PRSNT: CPT | Performed by: FAMILY MEDICINE

## 2025-07-24 PROCEDURE — G0439 PPPS, SUBSEQ VISIT: HCPCS | Performed by: FAMILY MEDICINE

## 2025-07-24 PROCEDURE — 1170F FXNL STATUS ASSESSED: CPT | Performed by: FAMILY MEDICINE

## 2025-07-24 PROCEDURE — 3078F DIAST BP <80 MM HG: CPT | Performed by: FAMILY MEDICINE

## 2025-07-24 PROCEDURE — 3074F SYST BP LT 130 MM HG: CPT | Performed by: FAMILY MEDICINE

## 2025-07-24 RX ORDER — POLYETHYLENE GLYCOL 3350 17 G/17G
17 POWDER, FOR SOLUTION ORAL DAILY PRN
Qty: 1530 G | Refills: 3 | Status: SHIPPED | OUTPATIENT
Start: 2025-07-24

## 2025-07-24 RX ORDER — TRAMADOL HYDROCHLORIDE 50 MG/1
50 TABLET ORAL EVERY 8 HOURS PRN
Qty: 90 TABLET | Refills: 5 | Status: SHIPPED | OUTPATIENT
Start: 2025-07-24

## 2025-07-24 NOTE — ASSESSMENT & PLAN NOTE
Orders:    traMADol (ULTRAM) 50 MG tablet; Take 1 tablet by mouth Every 8 (Eight) Hours As Needed for Moderate Pain.  Managed by rheumatology

## 2025-07-24 NOTE — PROGRESS NOTES
Subjective   The ABCs of the Annual Wellness Visit  Medicare Wellness Visit      Mady Pichardo is a 79 y.o. patient who presents for a Medicare Wellness Visit.    The following portions of the patient's history were reviewed and   updated as appropriate: allergies, current medications, past family history, past medical history, past social history, past surgical history, and problem list.    Compared to one year ago, the patient's physical   health is better.  Compared to one year ago, the patient's mental   health is better.    Recent Hospitalizations:  She was not admitted to the hospital during the last year.     Current Medical Providers:  Patient Care Team:  Donny Falcon MD as PCP - General (Family Medicine)  Jacob Sterling MD as Consulting Physician (Urology)  Shira Liu PA (Physician Assistant)  Steve Clifton MD (Ophthalmology)  Nita Riley PA-C as Physician Assistant (Gastroenterology)  Anselmo Avila MD (Obstetrics and Gynecology)  Tyler Moreno DPM as Consulting Physician (Podiatry)  Ofelia Garcia MD (Hematology)  Frantz Abraham DO as Obstetrician (Obstetrics and Gynecology)  Vasquez Alcala PA (Physician Assistant)    Outpatient Medications Prior to Visit   Medication Sig Dispense Refill    amLODIPine (NORVASC) 5 MG tablet Take 1 tablet by mouth Daily. 90 tablet 3    ammonium lactate (AMLACTIN) 12 % cream       Coenzyme Q10 200 MG tablet Take 200 mg by mouth.      dexlansoprazole (DEXILANT) 60 MG capsule Take 1 capsule by mouth Daily. 90 capsule 3    Diclofenac Sodium (VOLTAREN) 1 % gel gel       DULoxetine (CYMBALTA) 60 MG capsule Take 1 capsule by mouth Daily. 90 capsule 3    Enbrel Mini 50 MG/ML solution cartridge Inject 50 mg under the skin into the appropriate area as directed Every 7 (Seven) Days.      estradiol (ESTRACE) 0.1 MG/GM vaginal cream       gabapentin (NEURONTIN) 100 MG capsule Take 2 capsules by mouth Daily.      hydroxychloroquine (PLAQUENIL) 200  MG tablet Take 1 tablet by mouth 2 (Two) Times a Day.      metoprolol succinate XL (TOPROL-XL) 100 MG 24 hr tablet Take 1 tablet by mouth Daily. 90 tablet 3    ondansetron ODT (ZOFRAN-ODT) 4 MG disintegrating tablet Place 1 tablet on the tongue Every 8 (Eight) Hours As Needed for Nausea or Vomiting. 20 tablet 2    pravastatin (PRAVACHOL) 20 MG tablet Take 1 tablet by mouth Daily. 90 tablet 3    promethazine (PHENERGAN) 25 MG suppository Insert 1 suppository into the rectum Every 6 (Six) Hours As Needed for Nausea or Vomiting. 30 suppository 1    tiZANidine (ZANAFLEX) 4 MG tablet Take 1 tablet by mouth every night at bedtime. 90 tablet 3    valsartan-hydrochlorothiazide (DIOVAN-HCT) 320-25 MG per tablet Take 1 tablet by mouth Daily. 90 tablet 3    traMADol (ULTRAM) 50 MG tablet Take 1 tablet by mouth Every 6 (Six) Hours As Needed for Moderate Pain. 360 tablet 1    Adalimumab (Humira, 2 Pen,) 40 MG/0.4ML Auto-injector Kit  (Patient not taking: Reported on 7/24/2025)      metoclopramide (REGLAN) 10 MG tablet  (Patient not taking: Reported on 7/24/2025)       No facility-administered medications prior to visit.     Opioid medication/s are on active medication list.  and I have evaluated her active treatment plan and pain score trends (see table).  Vitals:    07/24/25 1011   PainSc: 0-No pain     I have reviewed the chart for potential of high risk medication and harmful drug interactions in the elderly.        Aspirin is not on active medication list.  Aspirin use is not indicated based on review of current medical condition/s. Risk of harm outweighs potential benefits.  .    Patient Active Problem List   Diagnosis    Prediabetes    Essential hypertension, benign    Mixed hyperlipidemia    Encounter for long-term (current) use of high-risk medication    Rheumatoid arthritis involving multiple sites with positive rheumatoid factor    Gastroesophageal reflux disease without esophagitis    Irritable bowel syndrome with  "diarrhea    Carter's esophagus without dysplasia    Idiopathic peripheral neuropathy    Lumbar radiculitis    Urge incontinence    Spinal stenosis of lumbar region without neurogenic claudication    Lower urinary tract symptoms (LUTS)    OAB (overactive bladder)    Urgency of urination    Spondylolisthesis at L1-L2 level    Recurrent major depression in full remission    Nonrheumatic aortic valve insufficiency     Advance Care Planning Advance Directive is not on file.  ACP discussion was held with the patient during this visit. Patient does not have an advance directive, information provided.            Objective   Vitals:    25 1011   BP: 128/76   BP Location: Left arm   Patient Position: Sitting   Cuff Size: Adult   Pulse: 54   SpO2: 98%   Weight: 64.9 kg (143 lb)   Height: 152.4 cm (60\")   PainSc: 0-No pain       Estimated body mass index is 27.93 kg/m² as calculated from the following:    Height as of this encounter: 152.4 cm (60\").    Weight as of this encounter: 64.9 kg (143 lb).                Does the patient have evidence of cognitive impairment? No                                                                                                Health  Risk Assessment    Smoking Status:  Social History     Tobacco Use   Smoking Status Former    Current packs/day: 0.00    Average packs/day: 1 pack/day for 20.0 years (20.0 ttl pk-yrs)    Types: Cigarettes    Start date:     Quit date:     Years since quittin.5    Passive exposure: Never   Smokeless Tobacco Never     Alcohol Consumption:  Social History     Substance and Sexual Activity   Alcohol Use Not Currently       Fall Risk Screen  STEADI Fall Risk Assessment was completed, and patient is at LOW risk for falls.Assessment completed on:2025    Depression Screening   Little interest or pleasure in doing things? Not at all   Feeling down, depressed, or hopeless? Not at all   PHQ-2 Total Score 0      Health Habits and Functional and " Cognitive Screenin/24/2025    10:13 AM   Functional & Cognitive Status   Do you have difficulty preparing food and eating? No   Do you have difficulty bathing yourself, getting dressed or grooming yourself? No   Do you have difficulty using the toilet? No   Do you have difficulty moving around from place to place? No   Do you have trouble with steps or getting out of a bed or a chair? Yes   Current Diet Well Balanced Diet   Dental Exam Not up to date   Eye Exam Up to date   Exercise (times per week) 7 times per week   Current Exercises Include Walking   Do you need help using the phone?  No   Are you deaf or do you have serious difficulty hearing?  No   Do you need help to go to places out of walking distance? No   Do you need help shopping? No   Do you need help preparing meals?  No   Do you need help with housework?  No   Do you need help with laundry? No   Do you need help taking your medications? No   Do you need help managing money? No   Do you ever drive or ride in a car without wearing a seat belt? No   Have you felt unusual fatigue (could be tiredness), stress, anger or loneliness in the last month? No   Who do you live with? Spouse   If you need help, do you have trouble finding someone available to you? No   Have you been bothered in the last four weeks by sexual problems? No   Do you have difficulty concentrating, remembering or making decisions? Yes           Age-appropriate Screening Schedule:  Refer to the list below for future screening recommendations based on patient's age, sex and/or medical conditions. Orders for these recommended tests are listed in the plan section. The patient has been provided with a written plan.    Health Maintenance List  Health Maintenance   Topic Date Due    LIPID PANEL  2025    COVID-19 Vaccine (2024- season) 2025 (Originally 2025)    TDAP/TD VACCINES (1 - Tdap) 2026 (Originally 1964)    INFLUENZA VACCINE  10/01/2025    DXA SCAN   03/12/2026    ANNUAL WELLNESS VISIT  07/24/2026    HEPATITIS C SCREENING  Completed    RSV Vaccine - Adults  Completed    Pneumococcal Vaccine 50+  Completed    ZOSTER VACCINE  Completed    MAMMOGRAM  Discontinued    HEMOGLOBIN A1C  Discontinued    COLORECTAL CANCER SCREENING  Discontinued                                                                                                                                                CMS Preventative Services Quick Reference  Risk Factors Identified During Encounter  Chronic Pain: Chronic pain is well-controlled with current medications  Depression/Dysphoria: Current medication is effective, no change recommended    The above risks/problems have been discussed with the patient.  Pertinent information has been shared with the patient in the After Visit Summary.  An After Visit Summary and PPPS were made available to the patient.    Follow Up:   Next Medicare Wellness visit to be scheduled in 1 year.         Additional E&M Note during same encounter follows:  Patient has additional, significant, and separately identifiable condition(s)/problem(s) that require work above and beyond the Medicare Wellness Visit     Chief Complaint  Medicare Wellness-subsequent and Hypertension    Subjective   Hypertension  Associated symptoms: no chest pain, no headaches, no neck pain, no palpitations and no shortness of breath      Mady is also being seen today for additional medical problem/s.  Also here for follow-up on hypertension and multiple other chronic issues.  The patient did have GYN surgery at [facility] in April of this year for complications from previous surgery involving scar tissue and mesh that resulted in chronic vaginal bleeding.  She says that since that time she has had some bad constipation, although at this time her stools are liquid, but she initially had a period where several days passed before she had a bowel movement and the stool was initially hard  "and difficult to pass, and since then it has been like this.  She denies any blood in stool or melena other than occasional hemorrhoid bleeding which is scant and not any different than it has been for years.  She does occasionally have some lower abdominal fullness and cramping.  She is taking generic Senokot twice a day, but the problem has persisted.  She says she does have vomiting that happens recurrently throughout the week, although she is also has a history of gastroparesis.  She has been seen by GI several times over the past year or 2 for her gastroparesis issues, but has not seen them since her surgery in April, and has appointment next month.  Review of Systems   Constitutional:  Negative for chills and fever.   HENT:  Negative for nosebleeds, sore throat and trouble swallowing.    Eyes:  Negative for visual disturbance.   Respiratory:  Negative for cough, choking, chest tightness, shortness of breath and wheezing.    Cardiovascular:  Negative for chest pain, palpitations and leg swelling.   Gastrointestinal:  Negative for abdominal pain, blood in stool, constipation, diarrhea, nausea and vomiting.   Endocrine: Negative for polydipsia, polyphagia and polyuria.   Genitourinary:  Negative for dysuria and hematuria.   Musculoskeletal:  Positive for arthralgias and back pain. Negative for gait problem, joint swelling, myalgias and neck pain.   Skin:  Negative for rash.   Neurological:  Negative for seizures, syncope, speech difficulty and weakness.   Hematological:  Negative for adenopathy.   Psychiatric/Behavioral:  Negative for dysphoric mood. The patient is not nervous/anxious.               Objective   Vital Signs:  /76 (BP Location: Left arm, Patient Position: Sitting, Cuff Size: Adult)   Pulse 54   Ht 152.4 cm (60\")   Wt 64.9 kg (143 lb)   SpO2 98%   BMI 27.93 kg/m²   Physical Exam  Constitutional:       General: She is not in acute distress.     Appearance: She is not toxic-appearing. "   HENT:      Head: Normocephalic and atraumatic.      Right Ear: Ear canal and external ear normal.      Left Ear: Ear canal and external ear normal.      Nose: Nose normal.      Mouth/Throat:      Mouth: Mucous membranes are moist.      Pharynx: Oropharynx is clear.   Eyes:      General: No scleral icterus.     Extraocular Movements: Extraocular movements intact.      Conjunctiva/sclera: Conjunctivae normal.      Pupils: Pupils are equal, round, and reactive to light.   Neck:      Vascular: No carotid bruit.   Cardiovascular:      Rate and Rhythm: Normal rate and regular rhythm.      Pulses: Normal pulses.      Heart sounds: Normal heart sounds. No murmur heard.     No gallop.   Pulmonary:      Effort: Pulmonary effort is normal.      Breath sounds: Normal breath sounds. No wheezing, rhonchi or rales.   Chest:      Chest wall: No tenderness.   Abdominal:      General: Bowel sounds are normal. There is no distension.      Palpations: Abdomen is soft. There is no mass.      Tenderness: There is no abdominal tenderness. There is no guarding or rebound.   Musculoskeletal:         General: No swelling or deformity. Normal range of motion.      Cervical back: Normal range of motion. No rigidity.      Right lower leg: No edema.      Left lower leg: No edema.   Lymphadenopathy:      Cervical: No cervical adenopathy.   Skin:     General: Skin is warm and dry.      Capillary Refill: Capillary refill takes less than 2 seconds.      Coloration: Skin is not jaundiced.      Findings: No erythema or rash.   Neurological:      General: No focal deficit present.      Mental Status: She is alert and oriented to person, place, and time.      Cranial Nerves: No cranial nerve deficit.      Motor: No weakness.      Coordination: Coordination normal.      Gait: Gait normal.   Psychiatric:         Mood and Affect: Mood normal.         Behavior: Behavior normal.         Thought Content: Thought content normal.         Judgment: Judgment  normal.                    Assessment and Plan      Medicare annual wellness visit, subsequent         Essential hypertension, benign           Prediabetes    Orders:    Hemoglobin A1c    Mixed hyperlipidemia       Orders:    Lipid Panel    Encounter for long-term (current) use of high-risk medication    Orders:    CBC & Differential    Comprehensive Metabolic Panel    Magnesium    Rheumatoid arthritis involving multiple sites with positive rheumatoid factor    Orders:    traMADol (ULTRAM) 50 MG tablet; Take 1 tablet by mouth Every 8 (Eight) Hours As Needed for Moderate Pain.  Managed by rheumatology  Gastroesophageal reflux disease without esophagitis         Cartre's esophagus without dysplasia  Patient continues with lifelong PPI therapy and regular GI follow-up       Idiopathic peripheral neuropathy    Orders:    Folate    Vitamin B12    Irritable bowel syndrome with diarrhea         Spinal stenosis of lumbar region without neurogenic claudication         OAB (overactive bladder)  Patient is followed by urology and has a bladder stimulator       Recurrent major depression in full remission    Well-controlled with Cymbalta 60 mg daily she will continue       Nonrheumatic aortic valve insufficiency         Other fatigue    Orders:    TSH    Constipation, unspecified constipation type    Orders:    XR Abdomen KUB  We will do a KUB to check for high impaction, and if negative the patient will try MiraLAX daily, and may increase to twice a day if needed.  If that is unsuccessful then she can call back for a trial of Linzess, or she can discuss this with the GI specialist at her follow-up there in a few weeks  Lower abdominal pain    Orders:    XR Abdomen KUB            Follow Up   No follow-ups on file.  Patient was given instructions and counseling regarding her condition or for health maintenance advice. Please see specific information pulled into the AVS if appropriate.

## 2025-07-24 NOTE — TELEPHONE ENCOUNTER
Please let pt know that the formal radiology report may not come back for a couple of days, but I do not see evidence of a fecal impaction, although there is a lot of stool in the colon. Hopefully the miralax will help, but if not, let us know

## 2025-07-24 NOTE — ASSESSMENT & PLAN NOTE
{Depression A/P Block (Optional):22219}  Well-controlled with Cymbalta 60 mg daily she will continue

## 2025-07-24 NOTE — TELEPHONE ENCOUNTER
LM on  to call back.    HUB to relay message from Dr. Falcon     Please let pt know that the formal radiology report may not come back for a couple of days, but I do not see evidence of a fecal impaction, although there is a lot of stool in the colon. Hopefully the miralax will help, but if not, let us know

## 2025-07-25 ENCOUNTER — RESULTS FOLLOW-UP (OUTPATIENT)
Dept: FAMILY MEDICINE CLINIC | Facility: CLINIC | Age: 80
End: 2025-07-25
Payer: MEDICARE

## 2025-07-25 ENCOUNTER — TELEPHONE (OUTPATIENT)
Dept: FAMILY MEDICINE CLINIC | Facility: CLINIC | Age: 80
End: 2025-07-25
Payer: MEDICARE

## 2025-07-25 LAB
ALBUMIN SERPL-MCNC: 4 G/DL (ref 3.8–4.8)
ALP SERPL-CCNC: 95 IU/L (ref 44–121)
ALT SERPL-CCNC: 15 IU/L (ref 0–32)
AST SERPL-CCNC: 18 IU/L (ref 0–40)
BASOPHILS # BLD AUTO: 0 X10E3/UL (ref 0–0.2)
BASOPHILS NFR BLD AUTO: 0 %
BILIRUB SERPL-MCNC: 0.3 MG/DL (ref 0–1.2)
BUN SERPL-MCNC: 22 MG/DL (ref 8–27)
BUN/CREAT SERPL: 34 (ref 12–28)
CALCIUM SERPL-MCNC: 9.6 MG/DL (ref 8.7–10.3)
CHLORIDE SERPL-SCNC: 107 MMOL/L (ref 96–106)
CHOLEST SERPL-MCNC: 126 MG/DL (ref 100–199)
CO2 SERPL-SCNC: 21 MMOL/L (ref 20–29)
CREAT SERPL-MCNC: 0.65 MG/DL (ref 0.57–1)
EGFRCR SERPLBLD CKD-EPI 2021: 91 ML/MIN/1.73
EOSINOPHIL # BLD AUTO: 0.1 X10E3/UL (ref 0–0.4)
EOSINOPHIL NFR BLD AUTO: 1 %
ERYTHROCYTE [DISTWIDTH] IN BLOOD BY AUTOMATED COUNT: 13.2 % (ref 11.7–15.4)
FOLATE SERPL-MCNC: >20 NG/ML
GLOBULIN SER CALC-MCNC: 2.2 G/DL (ref 1.5–4.5)
GLUCOSE SERPL-MCNC: 77 MG/DL (ref 70–99)
HBA1C MFR BLD: 5.8 % (ref 4.8–5.6)
HCT VFR BLD AUTO: 40 % (ref 34–46.6)
HDLC SERPL-MCNC: 47 MG/DL
HGB BLD-MCNC: 12.3 G/DL (ref 11.1–15.9)
IMM GRANULOCYTES # BLD AUTO: 0 X10E3/UL (ref 0–0.1)
IMM GRANULOCYTES NFR BLD AUTO: 0 %
LDLC SERPL CALC-MCNC: 51 MG/DL (ref 0–99)
LYMPHOCYTES # BLD AUTO: 2.7 X10E3/UL (ref 0.7–3.1)
LYMPHOCYTES NFR BLD AUTO: 30 %
MAGNESIUM SERPL-MCNC: 2.2 MG/DL (ref 1.6–2.3)
MCH RBC QN AUTO: 28.1 PG (ref 26.6–33)
MCHC RBC AUTO-ENTMCNC: 30.8 G/DL (ref 31.5–35.7)
MCV RBC AUTO: 92 FL (ref 79–97)
MONOCYTES # BLD AUTO: 1.4 X10E3/UL (ref 0.1–0.9)
MONOCYTES NFR BLD AUTO: 15 %
NEUTROPHILS # BLD AUTO: 4.8 X10E3/UL (ref 1.4–7)
NEUTROPHILS NFR BLD AUTO: 54 %
PLATELET # BLD AUTO: 320 X10E3/UL (ref 150–450)
POTASSIUM SERPL-SCNC: 4.2 MMOL/L (ref 3.5–5.2)
PROT SERPL-MCNC: 6.2 G/DL (ref 6–8.5)
RBC # BLD AUTO: 4.37 X10E6/UL (ref 3.77–5.28)
SODIUM SERPL-SCNC: 142 MMOL/L (ref 134–144)
TRIGL SERPL-MCNC: 169 MG/DL (ref 0–149)
TSH SERPL DL<=0.005 MIU/L-ACNC: 3.51 UIU/ML (ref 0.45–4.5)
VIT B12 SERPL-MCNC: 758 PG/ML (ref 232–1245)
VLDLC SERPL CALC-MCNC: 28 MG/DL (ref 5–40)
WBC # BLD AUTO: 9.1 X10E3/UL (ref 3.4–10.8)

## 2025-07-25 NOTE — TELEPHONE ENCOUNTER
LM on VM to call back.    HUB to relay message from Dr. Falcon     Please let pt know that Xrays did show a lot of stool throughout the colon, but no evidence of fecal impaction. Hopefully things will improve with treatment recommended, but if not, she should let me know

## 2025-07-25 NOTE — TELEPHONE ENCOUNTER
Name: Mady Pichardo    Relationship: Self    Best Callback Number: [phone]    HUB PROVIDED THE RELAY MESSAGE FROM THE OFFICE   PATIENT VOICED UNDERSTANDING AND HAS NO FURTHER QUESTIONS AT THIS TIME    ADDITIONAL INFORMATION:

## 2025-07-25 NOTE — LETTER
Mady Pichardo  [address] [facility] [address]    July 25, 2025     Dear Ms. Pichardo:    Below are the results from your recent visit:    Resulted Orders   CBC & Differential   Result Value Ref Range    WBC 9.1 3.4 - 10.8 x10E3/uL    RBC 4.37 3.77 - 5.28 x10E6/uL    Hemoglobin 12.3 11.1 - 15.9 g/dL    Hematocrit 40.0 34.0 - 46.6 %    MCV 92 79 - 97 fL    MCH 28.1 26.6 - 33.0 pg    MCHC 30.8 (L) 31.5 - 35.7 g/dL    RDW 13.2 11.7 - 15.4 %    Platelets 320 150 - 450 x10E3/uL    Neutrophil Rel % 54 Not Estab. %    Lymphocyte Rel % 30 Not Estab. %    Monocyte Rel % 15 Not Estab. %    Eosinophil Rel % 1 Not Estab. %    Basophil Rel % 0 Not Estab. %    Neutrophils Absolute 4.8 1.4 - 7.0 x10E3/uL    Lymphocytes Absolute 2.7 0.7 - 3.1 x10E3/uL    Monocytes Absolute 1.4 (H) 0.1 - 0.9 x10E3/uL    Eosinophils Absolute 0.1 0.0 - 0.4 x10E3/uL    Basophils Absolute 0.0 0.0 - 0.2 x10E3/uL    Immature Granulocyte Rel % 0 Not Estab. %    Immature Grans Absolute 0.0 0.0 - 0.1 x10E3/uL   Comprehensive Metabolic Panel   Result Value Ref Range    Glucose 77 70 - 99 mg/dL    BUN 22 8 - 27 mg/dL    Creatinine 0.65 0.57 - 1.00 mg/dL    EGFR Result 91 >59 mL/min/1.73    BUN/Creatinine Ratio 34 (H) 12 - 28    Sodium 142 134 - 144 mmol/L    Potassium 4.2 3.5 - 5.2 mmol/L    Chloride 107 (H) 96 - 106 mmol/L    Total CO2 21 20 - 29 mmol/L    Calcium 9.6 8.7 - 10.3 mg/dL    Total Protein 6.2 6.0 - 8.5 g/dL    Albumin 4.0 3.8 - 4.8 g/dL    Globulin 2.2 1.5 - 4.5 g/dL    Total Bilirubin 0.3 0.0 - 1.2 mg/dL    Alkaline Phosphatase 95 44 - 121 IU/L    AST (SGOT) 18 0 - 40 IU/L    ALT (SGPT) 15 0 - 32 IU/L   Hemoglobin A1c   Result Value Ref Range    Hemoglobin A1C 5.8 (H) 4.8 - 5.6 %      Comment:               Prediabetes: 5.7 - 6.4           Diabetes: >6.4           Glycemic control for adults with diabetes: <7.0     Lipid Panel   Result Value Ref Range    Total Cholesterol 126 100 - 199 mg/dL    Triglycerides 169 (H) 0 - 149 mg/dL    HDL  Cholesterol 47 >39 mg/dL    VLDL Cholesterol Dheeraj 28 5 - 40 mg/dL    LDL Chol Calc (NIH) 51 0 - 99 mg/dL   Folate   Result Value Ref Range    Folate >20.0 >3.0 ng/mL      Comment:      A serum folate concentration of less than 3.1 ng/mL is  considered to represent clinical deficiency.     TSH   Result Value Ref Range    TSH 3.510 0.450 - 4.500 uIU/mL   Vitamin B12   Result Value Ref Range    Vitamin B-12 758 232 - 1,245 pg/mL   Magnesium   Result Value Ref Range    Magnesium 2.2 1.6 - 2.3 mg/dL   XR Abdomen KUB    Impression    Impression:    1. Large amount of stool throughout the colon. Bowel gas pattern is otherwise unremarkable.      Electronically Signed: Rajesh Lee MD    7/24/2025 5:34 PM EDT    Workstation ID: [id number]       I am happy to report that your lab work was all either normal or stable compared with previous testing.  Please continue your current medications, and I will see you back at your next appointment.    My staff should have contacted you about this already, but your abdominal x-rays did not show any evidence of a fecal impaction.  There is a lot of stool throughout the colon, as expected, but there is no evidence of an impaction.  Hopefully using the MiraLAX will help, but if not, please let me know and we can try to get a prescription for Linzess approved by your insurance.    If you have any questions or concerns, please don't hesitate to call.         Sincerely,        Donny Falcon MD